# Patient Record
Sex: MALE | Race: BLACK OR AFRICAN AMERICAN | NOT HISPANIC OR LATINO | URBAN - METROPOLITAN AREA
[De-identification: names, ages, dates, MRNs, and addresses within clinical notes are randomized per-mention and may not be internally consistent; named-entity substitution may affect disease eponyms.]

---

## 2018-05-16 ENCOUNTER — INPATIENT (INPATIENT)
Facility: HOSPITAL | Age: 58
LOS: 14 days | Discharge: HOME | End: 2018-05-31
Attending: PSYCHIATRY & NEUROLOGY | Admitting: PSYCHIATRY & NEUROLOGY
Payer: MEDICAID

## 2018-05-16 VITALS
TEMPERATURE: 98 F | SYSTOLIC BLOOD PRESSURE: 154 MMHG | OXYGEN SATURATION: 98 % | HEART RATE: 94 BPM | DIASTOLIC BLOOD PRESSURE: 85 MMHG | RESPIRATION RATE: 18 BRPM

## 2018-05-16 DIAGNOSIS — F20.9 SCHIZOPHRENIA, UNSPECIFIED: ICD-10-CM

## 2018-05-16 DIAGNOSIS — F25.9 SCHIZOAFFECTIVE DISORDER, UNSPECIFIED: ICD-10-CM

## 2018-05-16 LAB
ALBUMIN SERPL ELPH-MCNC: 3.5 G/DL — SIGNIFICANT CHANGE UP (ref 3.3–5)
ALP SERPL-CCNC: 100 U/L — SIGNIFICANT CHANGE UP (ref 40–120)
ALT FLD-CCNC: 30 U/L — SIGNIFICANT CHANGE UP (ref 4–41)
APAP SERPL-MCNC: < 15 UG/ML — LOW (ref 15–25)
AST SERPL-CCNC: 63 U/L — HIGH (ref 4–40)
BASOPHILS # BLD AUTO: 0.02 K/UL — SIGNIFICANT CHANGE UP (ref 0–0.2)
BASOPHILS NFR BLD AUTO: 0.3 % — SIGNIFICANT CHANGE UP (ref 0–2)
BILIRUB SERPL-MCNC: 0.3 MG/DL — SIGNIFICANT CHANGE UP (ref 0.2–1.2)
BUN SERPL-MCNC: 14 MG/DL — SIGNIFICANT CHANGE UP (ref 7–23)
CALCIUM SERPL-MCNC: 8.5 MG/DL — SIGNIFICANT CHANGE UP (ref 8.4–10.5)
CHLORIDE SERPL-SCNC: 99 MMOL/L — SIGNIFICANT CHANGE UP (ref 98–107)
CO2 SERPL-SCNC: 26 MMOL/L — SIGNIFICANT CHANGE UP (ref 22–31)
CREAT SERPL-MCNC: 0.75 MG/DL — SIGNIFICANT CHANGE UP (ref 0.5–1.3)
EOSINOPHIL # BLD AUTO: 0.08 K/UL — SIGNIFICANT CHANGE UP (ref 0–0.5)
EOSINOPHIL NFR BLD AUTO: 1.2 % — SIGNIFICANT CHANGE UP (ref 0–6)
ETHANOL BLD-MCNC: < 10 MG/DL — SIGNIFICANT CHANGE UP
GLUCOSE SERPL-MCNC: 118 MG/DL — HIGH (ref 70–99)
HCT VFR BLD CALC: 39.2 % — SIGNIFICANT CHANGE UP (ref 39–50)
HGB BLD-MCNC: 12.9 G/DL — LOW (ref 13–17)
IMM GRANULOCYTES # BLD AUTO: 0.02 # — SIGNIFICANT CHANGE UP
IMM GRANULOCYTES NFR BLD AUTO: 0.3 % — SIGNIFICANT CHANGE UP (ref 0–1.5)
LYMPHOCYTES # BLD AUTO: 1.15 K/UL — SIGNIFICANT CHANGE UP (ref 1–3.3)
LYMPHOCYTES # BLD AUTO: 17.1 % — SIGNIFICANT CHANGE UP (ref 13–44)
MCHC RBC-ENTMCNC: 30.1 PG — SIGNIFICANT CHANGE UP (ref 27–34)
MCHC RBC-ENTMCNC: 32.9 % — SIGNIFICANT CHANGE UP (ref 32–36)
MCV RBC AUTO: 91.6 FL — SIGNIFICANT CHANGE UP (ref 80–100)
MONOCYTES # BLD AUTO: 0.79 K/UL — SIGNIFICANT CHANGE UP (ref 0–0.9)
MONOCYTES NFR BLD AUTO: 11.7 % — SIGNIFICANT CHANGE UP (ref 2–14)
NEUTROPHILS # BLD AUTO: 4.67 K/UL — SIGNIFICANT CHANGE UP (ref 1.8–7.4)
NEUTROPHILS NFR BLD AUTO: 69.4 % — SIGNIFICANT CHANGE UP (ref 43–77)
NRBC # FLD: 0 — SIGNIFICANT CHANGE UP
PLATELET # BLD AUTO: 262 K/UL — SIGNIFICANT CHANGE UP (ref 150–400)
PMV BLD: 9.3 FL — SIGNIFICANT CHANGE UP (ref 7–13)
POTASSIUM SERPL-MCNC: 3.9 MMOL/L — SIGNIFICANT CHANGE UP (ref 3.5–5.3)
POTASSIUM SERPL-SCNC: 3.9 MMOL/L — SIGNIFICANT CHANGE UP (ref 3.5–5.3)
PROT SERPL-MCNC: 6.7 G/DL — SIGNIFICANT CHANGE UP (ref 6–8.3)
RBC # BLD: 4.28 M/UL — SIGNIFICANT CHANGE UP (ref 4.2–5.8)
RBC # FLD: 13 % — SIGNIFICANT CHANGE UP (ref 10.3–14.5)
SALICYLATES SERPL-MCNC: < 5 MG/DL — LOW (ref 15–30)
SODIUM SERPL-SCNC: 136 MMOL/L — SIGNIFICANT CHANGE UP (ref 135–145)
TSH SERPL-MCNC: 0.72 UIU/ML — SIGNIFICANT CHANGE UP (ref 0.27–4.2)
WBC # BLD: 6.73 K/UL — SIGNIFICANT CHANGE UP (ref 3.8–10.5)
WBC # FLD AUTO: 6.73 K/UL — SIGNIFICANT CHANGE UP (ref 3.8–10.5)

## 2018-05-16 PROCEDURE — 99285 EMERGENCY DEPT VISIT HI MDM: CPT

## 2018-05-16 PROCEDURE — 73030 X-RAY EXAM OF SHOULDER: CPT | Mod: 26,RT

## 2018-05-16 RX ORDER — DIPHENHYDRAMINE HCL 50 MG
50 CAPSULE ORAL ONCE
Qty: 0 | Refills: 0 | Status: DISCONTINUED | OUTPATIENT
Start: 2018-05-16 | End: 2018-05-31

## 2018-05-16 RX ORDER — HALOPERIDOL DECANOATE 100 MG/ML
5 INJECTION INTRAMUSCULAR ONCE
Qty: 0 | Refills: 0 | Status: DISCONTINUED | OUTPATIENT
Start: 2018-05-16 | End: 2018-05-23

## 2018-05-16 RX ORDER — BENZTROPINE MESYLATE 1 MG
0.5 TABLET ORAL
Qty: 0 | Refills: 0 | Status: DISCONTINUED | OUTPATIENT
Start: 2018-05-16 | End: 2018-05-31

## 2018-05-16 RX ORDER — HALOPERIDOL DECANOATE 100 MG/ML
5 INJECTION INTRAMUSCULAR EVERY 6 HOURS
Qty: 0 | Refills: 0 | Status: DISCONTINUED | OUTPATIENT
Start: 2018-05-16 | End: 2018-05-31

## 2018-05-16 RX ORDER — RISPERIDONE 4 MG/1
2 TABLET ORAL
Qty: 0 | Refills: 0 | Status: DISCONTINUED | OUTPATIENT
Start: 2018-05-16 | End: 2018-05-17

## 2018-05-16 RX ORDER — DIVALPROEX SODIUM 500 MG/1
500 TABLET, DELAYED RELEASE ORAL
Qty: 0 | Refills: 0 | Status: DISCONTINUED | OUTPATIENT
Start: 2018-05-16 | End: 2018-05-22

## 2018-05-16 RX ORDER — ALBUTEROL 90 UG/1
1 AEROSOL, METERED ORAL EVERY 4 HOURS
Qty: 0 | Refills: 0 | Status: DISCONTINUED | OUTPATIENT
Start: 2018-05-16 | End: 2018-05-31

## 2018-05-16 RX ORDER — HALOPERIDOL DECANOATE 100 MG/ML
5 INJECTION INTRAMUSCULAR ONCE
Qty: 0 | Refills: 0 | Status: DISCONTINUED | OUTPATIENT
Start: 2018-05-16 | End: 2018-05-31

## 2018-05-16 RX ORDER — DIPHENHYDRAMINE HCL 50 MG
50 CAPSULE ORAL EVERY 6 HOURS
Qty: 0 | Refills: 0 | Status: DISCONTINUED | OUTPATIENT
Start: 2018-05-16 | End: 2018-05-31

## 2018-05-16 NOTE — ED BEHAVIORAL HEALTH ASSESSMENT NOTE - RISK ASSESSMENT
Risk factors- male gender, psychiatric illness, acute psychosis, history of suicide attempt, remote history of substance use, global insomnia, severe agitation, poor impulse control and insight.  Protective factors - domiciled.  Patient. is an acute risk, meets criteria of inpatient hospitalization of safety and stabilization.

## 2018-05-16 NOTE — ED BEHAVIORAL HEALTH ASSESSMENT NOTE - CURRENT MEDICATION
Unable to assess, pt. agitated and psychotic Unable to assess, pt. agitated and psychotic, but per sister he usually get Fluphenazine 50mg qmonth
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
## 2018-05-16 NOTE — ED BEHAVIORAL HEALTH ASSESSMENT NOTE - DESCRIPTION
Calm and cooperative  Vital Signs Last 24 Hrs  T(C): 36.9 (16 May 2018 19:06), Max: 36.9 (16 May 2018 19:06)  T(F): 98.5 (16 May 2018 19:06), Max: 98.5 (16 May 2018 19:06)  HR: 94 (16 May 2018 19:06) (94 - 94)  BP: 154/85 (16 May 2018 19:06) (154/85 - 154/85)  BP(mean): --  RR: 18 (16 May 2018 19:06) (18 - 18)  SpO2: 98% (16 May 2018 19:06) (98% - 98%) Asthma Single, disabled,

## 2018-05-16 NOTE — ED BEHAVIORAL HEALTH ASSESSMENT NOTE - OTHER
Semi-cooperative history of suicide attempt Possible homicidal ideation Patient. denies, but appears internally preoccupied. EMS Lives with his sister and her 3 children.
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
## 2018-05-16 NOTE — ED BEHAVIORAL HEALTH ASSESSMENT NOTE - OTHER PAST PSYCHIATRIC HISTORY (INCLUDE DETAILS REGARDING ONSET, COURSE OF ILLNESS, INPATIENT/OUTPATIENT TREATMENT)
history of Schizophrenia, >11 [facility], last 9/20/16, PMH- asthma, as per CVM -suicide attempts via overdose. According per chart, the pt.  has received a JONES, unknown where, when or name of medication.

## 2018-05-16 NOTE — ED BEHAVIORAL HEALTH ASSESSMENT NOTE - SUICIDE RISK FACTORS
Unable to engage in safety planning/Highly impulsive behavior/Global insomnia/Mood episode/Agitation/severe anxiety/Other

## 2018-05-16 NOTE — ED BEHAVIORAL HEALTH NOTE - BEHAVIORAL HEALTH NOTE
The patient has a history of 11 inpatient episodes at [facility], most recently discharged 3 years ago. He is in OP treatment at [facility], with Dr. Downey, where he is prescribed fluphenazine decanoate 50 mg q monthly, last administered 4/12/18. Writer spoke with his sister, Melaina Klein, [phone], for collateral information. She reported the following:    The patient’s injection was scheduled to be administered today, but the patient refused to go to the clinic. He first became symptomatic 7 days ago. Since then, he has been sad, eating erratically, his hygiene has been deteriorating, and he has not been sleeping. He has been pacing around the house, leaving lights on everywhere, and neighborhood all hours of the day and night. He has been aggressively yelling in the informant’s face, calling her profanities, and telling her to get away from him. He has been saying he wants to kill the informant, as he generally does when decompensating, but has never been physically aggressive toward anyone that she is aware of. He has been tearing up belongings and important documents of hers and his, including his birth certificate and social security card, and Bible. He took belongings out of the kitchen and discarded them. The informant is not aware of whether this includes kitchen knives. While he normally attends [employer], he is hyperreligious lately, talking about Arsen and the devil. He has been responding to internal stimuli, having conversations with others not present. He has been expressing delusions, stating others are out to get him and are following him. The patient has not expressed passive or active suicidal ideation, nor engaged in self-injurious behavior.     Writer obtained a bed on [facility] at [facility], and informed the patient’s sister, providing information about the unit.
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
## 2018-05-16 NOTE — ED BEHAVIORAL HEALTH ASSESSMENT NOTE - PAST PSYCHOTROPIC MEDICATION
Risperdal and Depakote- as per CVM Risperda 3mg BID, Cognetin 0.5mg BID and Depakote 500mg BID- as per record Risperdal 3mg BID, Cogentin 0.5mg BID and Depakote 500mg BID- as per record

## 2018-05-16 NOTE — ED BEHAVIORAL HEALTH ASSESSMENT NOTE - PSYCHIATRIC ISSUES AND PLAN (INCLUDE STANDING AND PRN MEDICATION)
Start Risperdal M-tab 2mg po BID , consider JONES, Haldol 5 mg po/IM q 6hours prn agitation, Ativan 2 mg po/Im q 4 hours prn agitation, Benadryl 50 mg tpo/IM q 4 hours prn agitation. Start Risperdal M-tab 2mg po BID, VPA 500mg BID, Cognetin 0..5mg bid , consider JONES, Haldol 5/ Ativan2/ Benadryl 50 q6H PRN for agitation
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
## 2018-05-17 LAB
AMPHET UR-MCNC: NEGATIVE — SIGNIFICANT CHANGE UP
APPEARANCE UR: CLEAR — SIGNIFICANT CHANGE UP
BACTERIA # UR AUTO: SIGNIFICANT CHANGE UP
BARBITURATES UR SCN-MCNC: NEGATIVE — SIGNIFICANT CHANGE UP
BENZODIAZ UR-MCNC: NEGATIVE — SIGNIFICANT CHANGE UP
BILIRUB UR-MCNC: NEGATIVE — SIGNIFICANT CHANGE UP
BLOOD UR QL VISUAL: NEGATIVE — SIGNIFICANT CHANGE UP
CANNABINOIDS UR-MCNC: POSITIVE — SIGNIFICANT CHANGE UP
COCAINE METAB.OTHER UR-MCNC: NEGATIVE — SIGNIFICANT CHANGE UP
COLOR SPEC: SIGNIFICANT CHANGE UP
GLUCOSE UR-MCNC: NEGATIVE — SIGNIFICANT CHANGE UP
KETONES UR-MCNC: NEGATIVE — SIGNIFICANT CHANGE UP
LEUKOCYTE ESTERASE UR-ACNC: NEGATIVE — SIGNIFICANT CHANGE UP
METHADONE UR-MCNC: NEGATIVE — SIGNIFICANT CHANGE UP
NITRITE UR-MCNC: NEGATIVE — SIGNIFICANT CHANGE UP
NON-SQ EPI CELLS # UR AUTO: <1 — SIGNIFICANT CHANGE UP
OPIATES UR-MCNC: NEGATIVE — SIGNIFICANT CHANGE UP
OXYCODONE UR-MCNC: NEGATIVE — SIGNIFICANT CHANGE UP
PCP UR-MCNC: NEGATIVE — SIGNIFICANT CHANGE UP
PH UR: 7 — SIGNIFICANT CHANGE UP (ref 4.6–8)
PROT UR-MCNC: NEGATIVE MG/DL — SIGNIFICANT CHANGE UP
RBC CASTS # UR COMP ASSIST: SIGNIFICANT CHANGE UP (ref 0–?)
SP GR SPEC: 1.01 — SIGNIFICANT CHANGE UP (ref 1–1.04)
UROBILINOGEN FLD QL: NORMAL MG/DL — SIGNIFICANT CHANGE UP
WBC UR QL: SIGNIFICANT CHANGE UP (ref 0–?)

## 2018-05-17 PROCEDURE — 99223 1ST HOSP IP/OBS HIGH 75: CPT

## 2018-05-17 RX ORDER — RISPERIDONE 4 MG/1
2 TABLET ORAL AT BEDTIME
Qty: 0 | Refills: 0 | Status: DISCONTINUED | OUTPATIENT
Start: 2018-05-18 | End: 2018-05-20

## 2018-05-17 RX ADMIN — Medication 2 MILLIGRAM(S): at 04:25

## 2018-05-17 RX ADMIN — DIVALPROEX SODIUM 500 MILLIGRAM(S): 500 TABLET, DELAYED RELEASE ORAL at 08:18

## 2018-05-17 RX ADMIN — DIVALPROEX SODIUM 500 MILLIGRAM(S): 500 TABLET, DELAYED RELEASE ORAL at 20:20

## 2018-05-17 RX ADMIN — Medication 0.5 MILLIGRAM(S): at 08:18

## 2018-05-17 RX ADMIN — Medication 50 MILLIGRAM(S): at 04:25

## 2018-05-17 RX ADMIN — HALOPERIDOL DECANOATE 5 MILLIGRAM(S): 100 INJECTION INTRAMUSCULAR at 04:25

## 2018-05-17 RX ADMIN — RISPERIDONE 2 MILLIGRAM(S): 4 TABLET ORAL at 08:18

## 2018-05-17 RX ADMIN — Medication 0.5 MILLIGRAM(S): at 20:20

## 2018-05-18 LAB
CHOLEST SERPL-MCNC: 108 MG/DL — LOW (ref 120–199)
HBA1C BLD-MCNC: 6 % — HIGH (ref 4–5.6)
HDLC SERPL-MCNC: 50 MG/DL — SIGNIFICANT CHANGE UP (ref 35–55)
LIPID PNL WITH DIRECT LDL SERPL: 52 MG/DL — SIGNIFICANT CHANGE UP
TRIGL SERPL-MCNC: 66 MG/DL — SIGNIFICANT CHANGE UP (ref 10–149)

## 2018-05-18 PROCEDURE — 99232 SBSQ HOSP IP/OBS MODERATE 35: CPT | Mod: 25

## 2018-05-18 PROCEDURE — 90853 GROUP PSYCHOTHERAPY: CPT

## 2018-05-18 RX ORDER — FLUPHENAZINE HYDROCHLORIDE 1 MG/1
50 TABLET, FILM COATED ORAL ONCE
Qty: 0 | Refills: 0 | Status: COMPLETED | OUTPATIENT
Start: 2018-05-18 | End: 2018-05-18

## 2018-05-18 RX ADMIN — Medication 2 MILLIGRAM(S): at 02:09

## 2018-05-18 RX ADMIN — RISPERIDONE 2 MILLIGRAM(S): 4 TABLET ORAL at 21:49

## 2018-05-18 RX ADMIN — FLUPHENAZINE HYDROCHLORIDE 50 MILLIGRAM(S): 1 TABLET, FILM COATED ORAL at 14:15

## 2018-05-18 RX ADMIN — Medication 0.5 MILLIGRAM(S): at 08:11

## 2018-05-18 RX ADMIN — HALOPERIDOL DECANOATE 5 MILLIGRAM(S): 100 INJECTION INTRAMUSCULAR at 02:09

## 2018-05-18 RX ADMIN — DIVALPROEX SODIUM 500 MILLIGRAM(S): 500 TABLET, DELAYED RELEASE ORAL at 08:11

## 2018-05-18 RX ADMIN — DIVALPROEX SODIUM 500 MILLIGRAM(S): 500 TABLET, DELAYED RELEASE ORAL at 21:49

## 2018-05-18 RX ADMIN — Medication 0.5 MILLIGRAM(S): at 21:49

## 2018-05-18 RX ADMIN — Medication 50 MILLIGRAM(S): at 02:09

## 2018-05-19 PROCEDURE — 99232 SBSQ HOSP IP/OBS MODERATE 35: CPT

## 2018-05-19 RX ADMIN — Medication 0.5 MILLIGRAM(S): at 08:16

## 2018-05-19 RX ADMIN — Medication 0.5 MILLIGRAM(S): at 21:11

## 2018-05-19 RX ADMIN — DIVALPROEX SODIUM 500 MILLIGRAM(S): 500 TABLET, DELAYED RELEASE ORAL at 08:16

## 2018-05-19 RX ADMIN — DIVALPROEX SODIUM 500 MILLIGRAM(S): 500 TABLET, DELAYED RELEASE ORAL at 21:11

## 2018-05-19 RX ADMIN — RISPERIDONE 2 MILLIGRAM(S): 4 TABLET ORAL at 21:11

## 2018-05-20 PROCEDURE — 99232 SBSQ HOSP IP/OBS MODERATE 35: CPT

## 2018-05-20 RX ORDER — RISPERIDONE 4 MG/1
3 TABLET ORAL AT BEDTIME
Qty: 0 | Refills: 0 | Status: DISCONTINUED | OUTPATIENT
Start: 2018-05-20 | End: 2018-05-23

## 2018-05-20 RX ADMIN — DIVALPROEX SODIUM 500 MILLIGRAM(S): 500 TABLET, DELAYED RELEASE ORAL at 21:20

## 2018-05-20 RX ADMIN — Medication 50 MILLIGRAM(S): at 13:26

## 2018-05-20 RX ADMIN — Medication 0.5 MILLIGRAM(S): at 08:31

## 2018-05-20 RX ADMIN — HALOPERIDOL DECANOATE 5 MILLIGRAM(S): 100 INJECTION INTRAMUSCULAR at 13:27

## 2018-05-20 RX ADMIN — Medication 2 MILLIGRAM(S): at 13:27

## 2018-05-20 RX ADMIN — DIVALPROEX SODIUM 500 MILLIGRAM(S): 500 TABLET, DELAYED RELEASE ORAL at 08:31

## 2018-05-20 RX ADMIN — RISPERIDONE 3 MILLIGRAM(S): 4 TABLET ORAL at 21:20

## 2018-05-20 RX ADMIN — Medication 0.5 MILLIGRAM(S): at 21:20

## 2018-05-21 PROCEDURE — 99232 SBSQ HOSP IP/OBS MODERATE 35: CPT

## 2018-05-21 RX ADMIN — DIVALPROEX SODIUM 500 MILLIGRAM(S): 500 TABLET, DELAYED RELEASE ORAL at 08:03

## 2018-05-21 RX ADMIN — Medication 0.5 MILLIGRAM(S): at 20:15

## 2018-05-21 RX ADMIN — Medication 0.5 MILLIGRAM(S): at 08:03

## 2018-05-21 RX ADMIN — DIVALPROEX SODIUM 500 MILLIGRAM(S): 500 TABLET, DELAYED RELEASE ORAL at 20:15

## 2018-05-21 RX ADMIN — RISPERIDONE 3 MILLIGRAM(S): 4 TABLET ORAL at 20:15

## 2018-05-22 LAB — VALPROATE SERPL-MCNC: 46.4 UG/ML — LOW (ref 50–100)

## 2018-05-22 PROCEDURE — 99232 SBSQ HOSP IP/OBS MODERATE 35: CPT | Mod: 25

## 2018-05-22 PROCEDURE — 90853 GROUP PSYCHOTHERAPY: CPT

## 2018-05-22 RX ORDER — IBUPROFEN 200 MG
400 TABLET ORAL ONCE
Qty: 0 | Refills: 0 | Status: COMPLETED | OUTPATIENT
Start: 2018-05-22 | End: 2018-05-22

## 2018-05-22 RX ORDER — DIVALPROEX SODIUM 500 MG/1
750 TABLET, DELAYED RELEASE ORAL
Qty: 0 | Refills: 0 | Status: DISCONTINUED | OUTPATIENT
Start: 2018-05-22 | End: 2018-05-31

## 2018-05-22 RX ADMIN — Medication 400 MILLIGRAM(S): at 21:30

## 2018-05-22 RX ADMIN — DIVALPROEX SODIUM 500 MILLIGRAM(S): 500 TABLET, DELAYED RELEASE ORAL at 08:15

## 2018-05-22 RX ADMIN — Medication 400 MILLIGRAM(S): at 06:39

## 2018-05-22 RX ADMIN — Medication 400 MILLIGRAM(S): at 05:39

## 2018-05-22 RX ADMIN — DIVALPROEX SODIUM 750 MILLIGRAM(S): 500 TABLET, DELAYED RELEASE ORAL at 21:30

## 2018-05-22 RX ADMIN — Medication 0.5 MILLIGRAM(S): at 21:30

## 2018-05-22 RX ADMIN — RISPERIDONE 3 MILLIGRAM(S): 4 TABLET ORAL at 21:30

## 2018-05-22 RX ADMIN — Medication 0.5 MILLIGRAM(S): at 08:15

## 2018-05-22 RX ADMIN — Medication 400 MILLIGRAM(S): at 22:30

## 2018-05-23 PROCEDURE — 99232 SBSQ HOSP IP/OBS MODERATE 35: CPT

## 2018-05-23 RX ORDER — RISPERIDONE 4 MG/1
4 TABLET ORAL AT BEDTIME
Qty: 0 | Refills: 0 | Status: DISCONTINUED | OUTPATIENT
Start: 2018-05-23 | End: 2018-05-31

## 2018-05-23 RX ORDER — HALOPERIDOL DECANOATE 100 MG/ML
5 INJECTION INTRAMUSCULAR ONCE
Qty: 0 | Refills: 0 | Status: DISCONTINUED | OUTPATIENT
Start: 2018-05-23 | End: 2018-05-31

## 2018-05-23 RX ADMIN — DIVALPROEX SODIUM 750 MILLIGRAM(S): 500 TABLET, DELAYED RELEASE ORAL at 08:11

## 2018-05-23 RX ADMIN — Medication 0.5 MILLIGRAM(S): at 08:11

## 2018-05-23 RX ADMIN — HALOPERIDOL DECANOATE 5 MILLIGRAM(S): 100 INJECTION INTRAMUSCULAR at 17:30

## 2018-05-23 RX ADMIN — RISPERIDONE 4 MILLIGRAM(S): 4 TABLET ORAL at 20:46

## 2018-05-23 RX ADMIN — Medication 50 MILLIGRAM(S): at 17:30

## 2018-05-23 RX ADMIN — Medication 2 MILLIGRAM(S): at 17:47

## 2018-05-23 RX ADMIN — DIVALPROEX SODIUM 750 MILLIGRAM(S): 500 TABLET, DELAYED RELEASE ORAL at 20:46

## 2018-05-23 RX ADMIN — Medication 0.5 MILLIGRAM(S): at 20:46

## 2018-05-24 PROCEDURE — 99232 SBSQ HOSP IP/OBS MODERATE 35: CPT

## 2018-05-24 RX ADMIN — Medication 50 MILLIGRAM(S): at 12:26

## 2018-05-24 RX ADMIN — Medication 0.5 MILLIGRAM(S): at 08:51

## 2018-05-24 RX ADMIN — Medication 0.5 MILLIGRAM(S): at 21:50

## 2018-05-24 RX ADMIN — RISPERIDONE 4 MILLIGRAM(S): 4 TABLET ORAL at 21:50

## 2018-05-24 RX ADMIN — Medication 2 MILLIGRAM(S): at 12:26

## 2018-05-24 RX ADMIN — DIVALPROEX SODIUM 750 MILLIGRAM(S): 500 TABLET, DELAYED RELEASE ORAL at 08:51

## 2018-05-24 RX ADMIN — DIVALPROEX SODIUM 750 MILLIGRAM(S): 500 TABLET, DELAYED RELEASE ORAL at 21:50

## 2018-05-24 RX ADMIN — HALOPERIDOL DECANOATE 5 MILLIGRAM(S): 100 INJECTION INTRAMUSCULAR at 12:26

## 2018-05-25 PROCEDURE — 99232 SBSQ HOSP IP/OBS MODERATE 35: CPT

## 2018-05-25 RX ORDER — IBUPROFEN 200 MG
400 TABLET ORAL EVERY 6 HOURS
Qty: 0 | Refills: 0 | Status: DISCONTINUED | OUTPATIENT
Start: 2018-05-25 | End: 2018-05-31

## 2018-05-25 RX ADMIN — DIVALPROEX SODIUM 750 MILLIGRAM(S): 500 TABLET, DELAYED RELEASE ORAL at 21:07

## 2018-05-25 RX ADMIN — Medication 400 MILLIGRAM(S): at 00:33

## 2018-05-25 RX ADMIN — Medication 0.5 MILLIGRAM(S): at 21:07

## 2018-05-25 RX ADMIN — Medication 0.5 MILLIGRAM(S): at 08:20

## 2018-05-25 RX ADMIN — DIVALPROEX SODIUM 750 MILLIGRAM(S): 500 TABLET, DELAYED RELEASE ORAL at 08:20

## 2018-05-25 RX ADMIN — RISPERIDONE 4 MILLIGRAM(S): 4 TABLET ORAL at 21:07

## 2018-05-26 RX ADMIN — Medication 2 MILLIGRAM(S): at 20:10

## 2018-05-26 RX ADMIN — Medication 0.5 MILLIGRAM(S): at 20:09

## 2018-05-26 RX ADMIN — DIVALPROEX SODIUM 750 MILLIGRAM(S): 500 TABLET, DELAYED RELEASE ORAL at 20:09

## 2018-05-26 RX ADMIN — RISPERIDONE 4 MILLIGRAM(S): 4 TABLET ORAL at 20:09

## 2018-05-26 RX ADMIN — HALOPERIDOL DECANOATE 5 MILLIGRAM(S): 100 INJECTION INTRAMUSCULAR at 20:10

## 2018-05-26 RX ADMIN — Medication 50 MILLIGRAM(S): at 20:10

## 2018-05-26 RX ADMIN — DIVALPROEX SODIUM 750 MILLIGRAM(S): 500 TABLET, DELAYED RELEASE ORAL at 08:22

## 2018-05-26 RX ADMIN — Medication 0.5 MILLIGRAM(S): at 08:22

## 2018-05-27 LAB — VALPROATE SERPL-MCNC: 73.9 UG/ML — SIGNIFICANT CHANGE UP (ref 50–100)

## 2018-05-27 RX ORDER — MAGNESIUM HYDROXIDE 400 MG/1
30 TABLET, CHEWABLE ORAL ONCE
Qty: 0 | Refills: 0 | Status: COMPLETED | OUTPATIENT
Start: 2018-05-27 | End: 2018-05-27

## 2018-05-27 RX ADMIN — DIVALPROEX SODIUM 750 MILLIGRAM(S): 500 TABLET, DELAYED RELEASE ORAL at 08:24

## 2018-05-27 RX ADMIN — Medication 0.5 MILLIGRAM(S): at 08:24

## 2018-05-27 RX ADMIN — MAGNESIUM HYDROXIDE 30 MILLILITER(S): 400 TABLET, CHEWABLE ORAL at 22:21

## 2018-05-27 RX ADMIN — Medication 2 MILLIGRAM(S): at 15:50

## 2018-05-27 RX ADMIN — RISPERIDONE 4 MILLIGRAM(S): 4 TABLET ORAL at 20:38

## 2018-05-27 RX ADMIN — Medication 0.5 MILLIGRAM(S): at 20:37

## 2018-05-27 RX ADMIN — Medication 50 MILLIGRAM(S): at 15:50

## 2018-05-27 RX ADMIN — HALOPERIDOL DECANOATE 5 MILLIGRAM(S): 100 INJECTION INTRAMUSCULAR at 15:50

## 2018-05-27 RX ADMIN — DIVALPROEX SODIUM 750 MILLIGRAM(S): 500 TABLET, DELAYED RELEASE ORAL at 20:38

## 2018-05-28 RX ADMIN — Medication 0.5 MILLIGRAM(S): at 08:08

## 2018-05-28 RX ADMIN — RISPERIDONE 4 MILLIGRAM(S): 4 TABLET ORAL at 20:50

## 2018-05-28 RX ADMIN — DIVALPROEX SODIUM 750 MILLIGRAM(S): 500 TABLET, DELAYED RELEASE ORAL at 20:50

## 2018-05-28 RX ADMIN — DIVALPROEX SODIUM 750 MILLIGRAM(S): 500 TABLET, DELAYED RELEASE ORAL at 08:08

## 2018-05-28 RX ADMIN — Medication 0.5 MILLIGRAM(S): at 20:50

## 2018-05-29 PROCEDURE — 90853 GROUP PSYCHOTHERAPY: CPT

## 2018-05-29 PROCEDURE — 99232 SBSQ HOSP IP/OBS MODERATE 35: CPT | Mod: 25

## 2018-05-29 RX ADMIN — RISPERIDONE 4 MILLIGRAM(S): 4 TABLET ORAL at 20:28

## 2018-05-29 RX ADMIN — Medication 0.5 MILLIGRAM(S): at 20:28

## 2018-05-29 RX ADMIN — DIVALPROEX SODIUM 750 MILLIGRAM(S): 500 TABLET, DELAYED RELEASE ORAL at 08:03

## 2018-05-29 RX ADMIN — Medication 0.5 MILLIGRAM(S): at 08:03

## 2018-05-29 RX ADMIN — DIVALPROEX SODIUM 750 MILLIGRAM(S): 500 TABLET, DELAYED RELEASE ORAL at 20:28

## 2018-05-30 VITALS — TEMPERATURE: 98 F

## 2018-05-30 PROCEDURE — 99232 SBSQ HOSP IP/OBS MODERATE 35: CPT

## 2018-05-30 RX ADMIN — DIVALPROEX SODIUM 750 MILLIGRAM(S): 500 TABLET, DELAYED RELEASE ORAL at 08:13

## 2018-05-30 RX ADMIN — Medication 0.5 MILLIGRAM(S): at 08:13

## 2018-05-30 RX ADMIN — Medication 0.5 MILLIGRAM(S): at 20:40

## 2018-05-30 RX ADMIN — RISPERIDONE 4 MILLIGRAM(S): 4 TABLET ORAL at 20:40

## 2018-05-30 RX ADMIN — DIVALPROEX SODIUM 750 MILLIGRAM(S): 500 TABLET, DELAYED RELEASE ORAL at 20:40

## 2018-05-31 RX ORDER — BENZTROPINE MESYLATE 1 MG
1 TABLET ORAL
Qty: 30 | Refills: 0 | OUTPATIENT
Start: 2018-05-31 | End: 2018-06-14

## 2018-05-31 RX ORDER — RISPERIDONE 4 MG/1
1 TABLET ORAL
Qty: 14 | Refills: 0 | OUTPATIENT
Start: 2018-05-31 | End: 2018-06-13

## 2018-05-31 RX ORDER — ALBUTEROL 90 UG/1
1 AEROSOL, METERED ORAL
Qty: 1 | Refills: 0 | OUTPATIENT
Start: 2018-05-31 | End: 2018-06-29

## 2018-05-31 RX ORDER — FLUPHENAZINE HYDROCHLORIDE 1 MG/1
50 TABLET, FILM COATED ORAL ONCE
Qty: 0 | Refills: 0 | Status: COMPLETED | OUTPATIENT
Start: 2018-05-31 | End: 2018-05-31

## 2018-05-31 RX ORDER — DIVALPROEX SODIUM 500 MG/1
3 TABLET, DELAYED RELEASE ORAL
Qty: 84 | Refills: 0 | OUTPATIENT
Start: 2018-05-31 | End: 2018-06-13

## 2018-05-31 RX ADMIN — DIVALPROEX SODIUM 750 MILLIGRAM(S): 500 TABLET, DELAYED RELEASE ORAL at 08:13

## 2018-05-31 RX ADMIN — Medication 0.5 MILLIGRAM(S): at 08:13

## 2018-05-31 RX ADMIN — FLUPHENAZINE HYDROCHLORIDE 50 MILLIGRAM(S): 1 TABLET, FILM COATED ORAL at 11:16

## 2018-10-15 ENCOUNTER — INPATIENT (INPATIENT)
Facility: HOSPITAL | Age: 58
LOS: 14 days | Discharge: HOME | End: 2018-10-30
Attending: PSYCHIATRY & NEUROLOGY | Admitting: PSYCHIATRY & NEUROLOGY
Payer: MEDICAID

## 2018-10-15 VITALS
TEMPERATURE: 98 F | HEART RATE: 54 BPM | SYSTOLIC BLOOD PRESSURE: 116 MMHG | OXYGEN SATURATION: 95 % | RESPIRATION RATE: 16 BRPM | DIASTOLIC BLOOD PRESSURE: 83 MMHG

## 2018-10-15 DIAGNOSIS — F25.9 SCHIZOAFFECTIVE DISORDER, UNSPECIFIED: ICD-10-CM

## 2018-10-15 LAB
ALBUMIN SERPL ELPH-MCNC: 3.6 G/DL — SIGNIFICANT CHANGE UP (ref 3.3–5)
ALP SERPL-CCNC: 87 U/L — SIGNIFICANT CHANGE UP (ref 40–120)
ALT FLD-CCNC: 13 U/L — SIGNIFICANT CHANGE UP (ref 4–41)
APAP SERPL-MCNC: < 15 UG/ML — LOW (ref 15–25)
AST SERPL-CCNC: 25 U/L — SIGNIFICANT CHANGE UP (ref 4–40)
BASOPHILS # BLD AUTO: 0.05 K/UL — SIGNIFICANT CHANGE UP (ref 0–0.2)
BASOPHILS NFR BLD AUTO: 0.9 % — SIGNIFICANT CHANGE UP (ref 0–2)
BILIRUB SERPL-MCNC: 0.2 MG/DL — SIGNIFICANT CHANGE UP (ref 0.2–1.2)
BUN SERPL-MCNC: 13 MG/DL — SIGNIFICANT CHANGE UP (ref 7–23)
CALCIUM SERPL-MCNC: 8.8 MG/DL — SIGNIFICANT CHANGE UP (ref 8.4–10.5)
CHLORIDE SERPL-SCNC: 101 MMOL/L — SIGNIFICANT CHANGE UP (ref 98–107)
CO2 SERPL-SCNC: 27 MMOL/L — SIGNIFICANT CHANGE UP (ref 22–31)
CREAT SERPL-MCNC: 0.81 MG/DL — SIGNIFICANT CHANGE UP (ref 0.5–1.3)
EOSINOPHIL # BLD AUTO: 0.5 K/UL — SIGNIFICANT CHANGE UP (ref 0–0.5)
EOSINOPHIL NFR BLD AUTO: 8.7 % — HIGH (ref 0–6)
ETHANOL BLD-MCNC: < 10 MG/DL — SIGNIFICANT CHANGE UP
GLUCOSE SERPL-MCNC: 112 MG/DL — HIGH (ref 70–99)
HCT VFR BLD CALC: 37.6 % — LOW (ref 39–50)
HGB BLD-MCNC: 12.2 G/DL — LOW (ref 13–17)
IMM GRANULOCYTES # BLD AUTO: 0.01 # — SIGNIFICANT CHANGE UP
IMM GRANULOCYTES NFR BLD AUTO: 0.2 % — SIGNIFICANT CHANGE UP (ref 0–1.5)
LYMPHOCYTES # BLD AUTO: 1.17 K/UL — SIGNIFICANT CHANGE UP (ref 1–3.3)
LYMPHOCYTES # BLD AUTO: 20.5 % — SIGNIFICANT CHANGE UP (ref 13–44)
MCHC RBC-ENTMCNC: 29.8 PG — SIGNIFICANT CHANGE UP (ref 27–34)
MCHC RBC-ENTMCNC: 32.4 % — SIGNIFICANT CHANGE UP (ref 32–36)
MCV RBC AUTO: 91.7 FL — SIGNIFICANT CHANGE UP (ref 80–100)
MONOCYTES # BLD AUTO: 0.72 K/UL — SIGNIFICANT CHANGE UP (ref 0–0.9)
MONOCYTES NFR BLD AUTO: 12.6 % — SIGNIFICANT CHANGE UP (ref 2–14)
NEUTROPHILS # BLD AUTO: 3.27 K/UL — SIGNIFICANT CHANGE UP (ref 1.8–7.4)
NEUTROPHILS NFR BLD AUTO: 57.1 % — SIGNIFICANT CHANGE UP (ref 43–77)
NRBC # FLD: 0 — SIGNIFICANT CHANGE UP
PLATELET # BLD AUTO: 419 K/UL — HIGH (ref 150–400)
PMV BLD: 8.7 FL — SIGNIFICANT CHANGE UP (ref 7–13)
POTASSIUM SERPL-MCNC: 3.8 MMOL/L — SIGNIFICANT CHANGE UP (ref 3.5–5.3)
POTASSIUM SERPL-SCNC: 3.8 MMOL/L — SIGNIFICANT CHANGE UP (ref 3.5–5.3)
PROT SERPL-MCNC: 6.1 G/DL — SIGNIFICANT CHANGE UP (ref 6–8.3)
RBC # BLD: 4.1 M/UL — LOW (ref 4.2–5.8)
RBC # FLD: 13.2 % — SIGNIFICANT CHANGE UP (ref 10.3–14.5)
SALICYLATES SERPL-MCNC: < 5 MG/DL — LOW (ref 15–30)
SODIUM SERPL-SCNC: 140 MMOL/L — SIGNIFICANT CHANGE UP (ref 135–145)
TSH SERPL-MCNC: 0.98 UIU/ML — SIGNIFICANT CHANGE UP (ref 0.27–4.2)
VALPROATE SERPL-MCNC: < 3.2 UG/ML — LOW (ref 50–100)
WBC # BLD: 5.72 K/UL — SIGNIFICANT CHANGE UP (ref 3.8–10.5)
WBC # FLD AUTO: 5.72 K/UL — SIGNIFICANT CHANGE UP (ref 3.8–10.5)

## 2018-10-15 PROCEDURE — 99285 EMERGENCY DEPT VISIT HI MDM: CPT

## 2018-10-15 RX ORDER — HALOPERIDOL DECANOATE 100 MG/ML
5 INJECTION INTRAMUSCULAR ONCE
Qty: 0 | Refills: 0 | Status: COMPLETED | OUTPATIENT
Start: 2018-10-15 | End: 2018-10-15

## 2018-10-15 RX ORDER — HALOPERIDOL DECANOATE 100 MG/ML
5 INJECTION INTRAMUSCULAR EVERY 6 HOURS
Qty: 0 | Refills: 0 | Status: DISCONTINUED | OUTPATIENT
Start: 2018-10-15 | End: 2018-10-15

## 2018-10-15 RX ORDER — DIPHENHYDRAMINE HCL 50 MG
50 CAPSULE ORAL ONCE
Qty: 0 | Refills: 0 | Status: COMPLETED | OUTPATIENT
Start: 2018-10-15 | End: 2018-10-15

## 2018-10-15 RX ORDER — HALOPERIDOL DECANOATE 100 MG/ML
5 INJECTION INTRAMUSCULAR EVERY 6 HOURS
Qty: 0 | Refills: 0 | Status: DISCONTINUED | OUTPATIENT
Start: 2018-10-15 | End: 2018-10-30

## 2018-10-15 RX ORDER — RISPERIDONE 4 MG/1
1 TABLET ORAL
Qty: 0 | Refills: 0 | Status: DISCONTINUED | OUTPATIENT
Start: 2018-10-15 | End: 2018-10-16

## 2018-10-15 RX ORDER — DIVALPROEX SODIUM 500 MG/1
500 TABLET, DELAYED RELEASE ORAL
Qty: 0 | Refills: 0 | Status: DISCONTINUED | OUTPATIENT
Start: 2018-10-15 | End: 2018-10-16

## 2018-10-15 RX ORDER — ACETAMINOPHEN 500 MG
650 TABLET ORAL EVERY 6 HOURS
Qty: 0 | Refills: 0 | Status: DISCONTINUED | OUTPATIENT
Start: 2018-10-15 | End: 2018-10-30

## 2018-10-15 RX ADMIN — Medication 2 MILLIGRAM(S): at 03:00

## 2018-10-15 RX ADMIN — RISPERIDONE 1 MILLIGRAM(S): 4 TABLET ORAL at 21:07

## 2018-10-15 RX ADMIN — Medication 50 MILLIGRAM(S): at 03:00

## 2018-10-15 RX ADMIN — HALOPERIDOL DECANOATE 5 MILLIGRAM(S): 100 INJECTION INTRAMUSCULAR at 03:00

## 2018-10-15 RX ADMIN — Medication 2 MILLIGRAM(S): at 21:07

## 2018-10-15 RX ADMIN — HALOPERIDOL DECANOATE 5 MILLIGRAM(S): 100 INJECTION INTRAMUSCULAR at 21:07

## 2018-10-15 RX ADMIN — DIVALPROEX SODIUM 500 MILLIGRAM(S): 500 TABLET, DELAYED RELEASE ORAL at 21:07
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
## 2018-10-15 NOTE — ED BEHAVIORAL HEALTH ASSESSMENT NOTE - OTHER PAST PSYCHIATRIC HISTORY (INCLUDE DETAILS REGARDING ONSET, COURSE OF ILLNESS, INPATIENT/OUTPATIENT TREATMENT)
history of Schizophrenia, >12 ZHH, last 9/20/16, PMH- asthma, as per CVM -suicide attempts via overdose. According per chart, the pt.  has received a JONES, unknown where, when or name of medication.

## 2018-10-15 NOTE — ED BEHAVIORAL HEALTH NOTE - BEHAVIORAL HEALTH NOTE
Writer called patient's sister Lisseth Klein [phone] to inform her of patient's transfer to [facility] and provided nursing station contact information.

## 2018-10-15 NOTE — ED BEHAVIORAL HEALTH ASSESSMENT NOTE - PSYCHIATRIC ISSUES AND PLAN (INCLUDE STANDING AND PRN MEDICATION)
schizoaffective disorder: restart risperidone 2mg hs, depakote 750mg hs, Haldol 5mg PO q 6 hrs prn agitation; Ativan 2mg PO q 6hrs prn anxiety/agitation, Diphenhydramine 50mg PO q 6hr prn EPS, insomnia or agitation.      Haldol 5mg IM; Ativan 2mg IM, Diphenhydramine 50mg IM once prn severe agitation (combativeness, assaultive behavior) after notification of a prescribing clinician.

## 2018-10-15 NOTE — ED ADULT NURSE NOTE - NSIMPLEMENTINTERV_GEN_ALL_ED
Implemented All Universal Safety Interventions:  [place] to call system. Call bell, personal items and telephone within reach. Instruct patient to call for assistance. Room bathroom lighting operational. Non-slip footwear when patient is off stretcher. Physically safe environment: no spills, clutter or unnecessary equipment. Stretcher in lowest position, wheels locked, appropriate side rails in place.

## 2018-10-15 NOTE — ED PROVIDER NOTE - MEDICAL DECISION MAKING DETAILS
pt with decompensated shizo-affective - will clear medically and have  evaluate the patient.  Due to severe agitation and aggressiveness IM medications were given.
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
## 2018-10-15 NOTE — ED PROVIDER NOTE - PHYSICAL EXAMINATION
Gen: Well appearing in NAD poor hygiene   Head: NC/AT  Neck: trachea midline  Resp:  No distress  Ext: no deformities  Neuro:  A&O appears non focal  Skin:  Warm and dry as visualized  Psych:  Paranoia

## 2018-10-15 NOTE — ED BEHAVIORAL HEALTH ASSESSMENT NOTE - DESCRIPTION
Asthma Single, disabled, irritable, threatening, agitated required IM medication  ICU Vital Signs Last 24 Hrs  T(C): 36.6 (15 Oct 2018 02:03), Max: 36.6 (15 Oct 2018 02:03)  T(F): 97.9 (15 Oct 2018 02:03), Max: 97.9 (15 Oct 2018 02:03)  HR: 54 (15 Oct 2018 02:03) (54 - 54)  BP: 116/83 (15 Oct 2018 02:03) (116/83 - 116/83)  BP(mean): --  ABP: --  ABP(mean): --  RR: 16 (15 Oct 2018 02:03) (16 - 16)  SpO2: 95% (15 Oct 2018 02:03) (95% - 95%)

## 2018-10-15 NOTE — ED ADULT NURSE NOTE - OBJECTIVE STATEMENT
Pt arrives to  area with history of schizoaffective disorder and non-compliant with medications.  Patient's sister called 911 because patient is non-compliant with medications, bizarre behavior and talking to himself.  Pt ia aggressive and threatening to female staff members saying they are practicing witchcraft. Pt belongings checked by Security, PES placed belongings in low acuity area.  Pt speaking expressing hyper Amish statements.  Denies Si/Hi.  Dr. Strickland evaluated patient in triage. Psychiatrist attempting to speak with pt however the pt is threatening violence to staff.  Pt medicated as per EMAR.  Pt placed in room 6.  Will continue to monitor.
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
## 2018-10-15 NOTE — ED BEHAVIORAL HEALTH ASSESSMENT NOTE - PAST PSYCHOTROPIC MEDICATION
Risperdal 3mg BID, Cogentin 0.5mg BID and Depakote 500mg BID- as per record Risperdal 3mg BID, Cogentin 0.5mg BID and [id number] bid

## 2018-10-15 NOTE — ED ADULT NURSE NOTE - CHPI ED NUR SYMPTOMS NEG
no pain/no chills/no dizziness/no vomiting/no fever/no weakness/no nausea/no tingling/no decreased eating/drinking

## 2018-10-16 PROCEDURE — 90853 GROUP PSYCHOTHERAPY: CPT

## 2018-10-16 PROCEDURE — 99221 1ST HOSP IP/OBS SF/LOW 40: CPT | Mod: 25

## 2018-10-16 RX ORDER — ALBUTEROL 90 UG/1
2 AEROSOL, METERED ORAL EVERY 6 HOURS
Qty: 0 | Refills: 0 | Status: DISCONTINUED | OUTPATIENT
Start: 2018-10-16 | End: 2018-10-30

## 2018-10-16 RX ORDER — FLUPHENAZINE HYDROCHLORIDE 1 MG/1
5 TABLET, FILM COATED ORAL AT BEDTIME
Qty: 0 | Refills: 0 | Status: DISCONTINUED | OUTPATIENT
Start: 2018-10-16 | End: 2018-10-22

## 2018-10-16 RX ORDER — DIPHENHYDRAMINE HCL 50 MG
50 CAPSULE ORAL ONCE
Qty: 0 | Refills: 0 | Status: DISCONTINUED | OUTPATIENT
Start: 2018-10-16 | End: 2018-10-30

## 2018-10-16 RX ORDER — DIVALPROEX SODIUM 500 MG/1
750 TABLET, DELAYED RELEASE ORAL
Qty: 0 | Refills: 0 | Status: DISCONTINUED | OUTPATIENT
Start: 2018-10-16 | End: 2018-10-30

## 2018-10-16 RX ORDER — HALOPERIDOL DECANOATE 100 MG/ML
5 INJECTION INTRAMUSCULAR ONCE
Qty: 0 | Refills: 0 | Status: DISCONTINUED | OUTPATIENT
Start: 2018-10-16 | End: 2018-10-30

## 2018-10-16 RX ORDER — RISPERIDONE 4 MG/1
2 TABLET ORAL AT BEDTIME
Qty: 0 | Refills: 0 | Status: DISCONTINUED | OUTPATIENT
Start: 2018-10-16 | End: 2018-10-19

## 2018-10-16 RX ORDER — BENZTROPINE MESYLATE 1 MG
0.5 TABLET ORAL
Qty: 0 | Refills: 0 | Status: DISCONTINUED | OUTPATIENT
Start: 2018-10-16 | End: 2018-10-30

## 2018-10-16 RX ADMIN — RISPERIDONE 2 MILLIGRAM(S): 4 TABLET ORAL at 21:24

## 2018-10-16 RX ADMIN — FLUPHENAZINE HYDROCHLORIDE 5 MILLIGRAM(S): 1 TABLET, FILM COATED ORAL at 21:24

## 2018-10-16 RX ADMIN — DIVALPROEX SODIUM 750 MILLIGRAM(S): 500 TABLET, DELAYED RELEASE ORAL at 21:24

## 2018-10-16 RX ADMIN — DIVALPROEX SODIUM 500 MILLIGRAM(S): 500 TABLET, DELAYED RELEASE ORAL at 08:39

## 2018-10-16 RX ADMIN — Medication 0.5 MILLIGRAM(S): at 21:24

## 2018-10-16 RX ADMIN — RISPERIDONE 1 MILLIGRAM(S): 4 TABLET ORAL at 08:39

## 2018-10-17 PROCEDURE — 99232 SBSQ HOSP IP/OBS MODERATE 35: CPT

## 2018-10-17 RX ADMIN — Medication 0.5 MILLIGRAM(S): at 08:46

## 2018-10-17 RX ADMIN — DIVALPROEX SODIUM 750 MILLIGRAM(S): 500 TABLET, DELAYED RELEASE ORAL at 20:35

## 2018-10-17 RX ADMIN — DIVALPROEX SODIUM 750 MILLIGRAM(S): 500 TABLET, DELAYED RELEASE ORAL at 08:46

## 2018-10-17 RX ADMIN — Medication 0.5 MILLIGRAM(S): at 20:35

## 2018-10-17 RX ADMIN — RISPERIDONE 2 MILLIGRAM(S): 4 TABLET ORAL at 20:35

## 2018-10-17 RX ADMIN — FLUPHENAZINE HYDROCHLORIDE 5 MILLIGRAM(S): 1 TABLET, FILM COATED ORAL at 20:35

## 2018-10-18 PROCEDURE — 99232 SBSQ HOSP IP/OBS MODERATE 35: CPT | Mod: GC

## 2018-10-18 RX ADMIN — DIVALPROEX SODIUM 750 MILLIGRAM(S): 500 TABLET, DELAYED RELEASE ORAL at 21:23

## 2018-10-18 RX ADMIN — RISPERIDONE 2 MILLIGRAM(S): 4 TABLET ORAL at 21:24

## 2018-10-18 RX ADMIN — Medication 0.5 MILLIGRAM(S): at 21:23

## 2018-10-18 RX ADMIN — FLUPHENAZINE HYDROCHLORIDE 5 MILLIGRAM(S): 1 TABLET, FILM COATED ORAL at 21:23

## 2018-10-18 RX ADMIN — DIVALPROEX SODIUM 750 MILLIGRAM(S): 500 TABLET, DELAYED RELEASE ORAL at 08:58

## 2018-10-18 RX ADMIN — Medication 0.5 MILLIGRAM(S): at 08:58

## 2018-10-19 PROCEDURE — 99232 SBSQ HOSP IP/OBS MODERATE 35: CPT | Mod: GC

## 2018-10-19 RX ORDER — RISPERIDONE 4 MG/1
3 TABLET ORAL AT BEDTIME
Qty: 0 | Refills: 0 | Status: DISCONTINUED | OUTPATIENT
Start: 2018-10-19 | End: 2018-10-30

## 2018-10-19 RX ORDER — DIPHENHYDRAMINE HCL 50 MG
50 CAPSULE ORAL ONCE
Qty: 0 | Refills: 0 | Status: COMPLETED | OUTPATIENT
Start: 2018-10-19 | End: 2018-10-19

## 2018-10-19 RX ADMIN — Medication 0.5 MILLIGRAM(S): at 21:31

## 2018-10-19 RX ADMIN — RISPERIDONE 3 MILLIGRAM(S): 4 TABLET ORAL at 21:31

## 2018-10-19 RX ADMIN — Medication 50 MILLIGRAM(S): at 02:59

## 2018-10-19 RX ADMIN — Medication 0.5 MILLIGRAM(S): at 08:43

## 2018-10-19 RX ADMIN — FLUPHENAZINE HYDROCHLORIDE 5 MILLIGRAM(S): 1 TABLET, FILM COATED ORAL at 21:31

## 2018-10-19 RX ADMIN — DIVALPROEX SODIUM 750 MILLIGRAM(S): 500 TABLET, DELAYED RELEASE ORAL at 08:43

## 2018-10-19 RX ADMIN — DIVALPROEX SODIUM 750 MILLIGRAM(S): 500 TABLET, DELAYED RELEASE ORAL at 21:31

## 2018-10-20 PROCEDURE — 99232 SBSQ HOSP IP/OBS MODERATE 35: CPT

## 2018-10-20 RX ADMIN — Medication 0.5 MILLIGRAM(S): at 21:36

## 2018-10-20 RX ADMIN — DIVALPROEX SODIUM 750 MILLIGRAM(S): 500 TABLET, DELAYED RELEASE ORAL at 09:08

## 2018-10-20 RX ADMIN — Medication 0.5 MILLIGRAM(S): at 09:08

## 2018-10-20 RX ADMIN — DIVALPROEX SODIUM 750 MILLIGRAM(S): 500 TABLET, DELAYED RELEASE ORAL at 21:36

## 2018-10-20 RX ADMIN — RISPERIDONE 3 MILLIGRAM(S): 4 TABLET ORAL at 21:36

## 2018-10-20 RX ADMIN — FLUPHENAZINE HYDROCHLORIDE 5 MILLIGRAM(S): 1 TABLET, FILM COATED ORAL at 21:36

## 2018-10-21 PROCEDURE — 99232 SBSQ HOSP IP/OBS MODERATE 35: CPT

## 2018-10-21 RX ADMIN — Medication 0.5 MILLIGRAM(S): at 21:32

## 2018-10-21 RX ADMIN — Medication 2 MILLIGRAM(S): at 23:25

## 2018-10-21 RX ADMIN — FLUPHENAZINE HYDROCHLORIDE 5 MILLIGRAM(S): 1 TABLET, FILM COATED ORAL at 21:32

## 2018-10-21 RX ADMIN — RISPERIDONE 3 MILLIGRAM(S): 4 TABLET ORAL at 21:33

## 2018-10-21 RX ADMIN — DIVALPROEX SODIUM 750 MILLIGRAM(S): 500 TABLET, DELAYED RELEASE ORAL at 21:32

## 2018-10-21 RX ADMIN — Medication 0.5 MILLIGRAM(S): at 08:30

## 2018-10-21 RX ADMIN — DIVALPROEX SODIUM 750 MILLIGRAM(S): 500 TABLET, DELAYED RELEASE ORAL at 08:30

## 2018-10-22 PROCEDURE — 99232 SBSQ HOSP IP/OBS MODERATE 35: CPT | Mod: GC

## 2018-10-22 RX ORDER — FLUPHENAZINE HYDROCHLORIDE 1 MG/1
10 TABLET, FILM COATED ORAL AT BEDTIME
Qty: 0 | Refills: 0 | Status: DISCONTINUED | OUTPATIENT
Start: 2018-10-22 | End: 2018-10-23

## 2018-10-22 RX ADMIN — FLUPHENAZINE HYDROCHLORIDE 10 MILLIGRAM(S): 1 TABLET, FILM COATED ORAL at 21:13

## 2018-10-22 RX ADMIN — RISPERIDONE 3 MILLIGRAM(S): 4 TABLET ORAL at 21:28

## 2018-10-22 RX ADMIN — Medication 0.5 MILLIGRAM(S): at 09:30

## 2018-10-22 RX ADMIN — DIVALPROEX SODIUM 750 MILLIGRAM(S): 500 TABLET, DELAYED RELEASE ORAL at 21:13

## 2018-10-22 RX ADMIN — DIVALPROEX SODIUM 750 MILLIGRAM(S): 500 TABLET, DELAYED RELEASE ORAL at 09:30

## 2018-10-22 RX ADMIN — Medication 0.5 MILLIGRAM(S): at 21:13

## 2018-10-23 PROCEDURE — 99232 SBSQ HOSP IP/OBS MODERATE 35: CPT | Mod: GC

## 2018-10-23 RX ORDER — FLUPHENAZINE HYDROCHLORIDE 1 MG/1
10 TABLET, FILM COATED ORAL
Qty: 0 | Refills: 0 | Status: DISCONTINUED | OUTPATIENT
Start: 2018-10-23 | End: 2018-10-30

## 2018-10-23 RX ORDER — FLUPHENAZINE HYDROCHLORIDE 1 MG/1
20 TABLET, FILM COATED ORAL
Qty: 0 | Refills: 0 | Status: DISCONTINUED | OUTPATIENT
Start: 2018-10-23 | End: 2018-10-23

## 2018-10-23 RX ORDER — FLUPHENAZINE HYDROCHLORIDE 1 MG/1
50 TABLET, FILM COATED ORAL ONCE
Qty: 0 | Refills: 0 | Status: COMPLETED | OUTPATIENT
Start: 2018-10-23 | End: 2018-10-23

## 2018-10-23 RX ADMIN — DIVALPROEX SODIUM 750 MILLIGRAM(S): 500 TABLET, DELAYED RELEASE ORAL at 21:15

## 2018-10-23 RX ADMIN — DIVALPROEX SODIUM 750 MILLIGRAM(S): 500 TABLET, DELAYED RELEASE ORAL at 09:43

## 2018-10-23 RX ADMIN — Medication 0.5 MILLIGRAM(S): at 09:43

## 2018-10-23 RX ADMIN — RISPERIDONE 3 MILLIGRAM(S): 4 TABLET ORAL at 21:15

## 2018-10-23 RX ADMIN — FLUPHENAZINE HYDROCHLORIDE 50 MILLIGRAM(S): 1 TABLET, FILM COATED ORAL at 13:20

## 2018-10-23 RX ADMIN — FLUPHENAZINE HYDROCHLORIDE 10 MILLIGRAM(S): 1 TABLET, FILM COATED ORAL at 21:15

## 2018-10-23 RX ADMIN — Medication 0.5 MILLIGRAM(S): at 21:15

## 2018-10-24 LAB
CHOLEST SERPL-MCNC: 140 MG/DL — SIGNIFICANT CHANGE UP (ref 120–199)
HBA1C BLD-MCNC: 6.1 % — HIGH (ref 4–5.6)
HDLC SERPL-MCNC: 48 MG/DL — SIGNIFICANT CHANGE UP (ref 35–55)
LIPID PNL WITH DIRECT LDL SERPL: 78 MG/DL — SIGNIFICANT CHANGE UP
TRIGL SERPL-MCNC: 112 MG/DL — SIGNIFICANT CHANGE UP (ref 10–149)
VALPROATE SERPL-MCNC: 61.7 UG/ML — SIGNIFICANT CHANGE UP (ref 50–100)

## 2018-10-24 PROCEDURE — 99232 SBSQ HOSP IP/OBS MODERATE 35: CPT | Mod: GC

## 2018-10-24 RX ADMIN — RISPERIDONE 3 MILLIGRAM(S): 4 TABLET ORAL at 21:02

## 2018-10-24 RX ADMIN — DIVALPROEX SODIUM 750 MILLIGRAM(S): 500 TABLET, DELAYED RELEASE ORAL at 09:30

## 2018-10-24 RX ADMIN — FLUPHENAZINE HYDROCHLORIDE 10 MILLIGRAM(S): 1 TABLET, FILM COATED ORAL at 09:29

## 2018-10-24 RX ADMIN — DIVALPROEX SODIUM 750 MILLIGRAM(S): 500 TABLET, DELAYED RELEASE ORAL at 21:01

## 2018-10-24 RX ADMIN — FLUPHENAZINE HYDROCHLORIDE 10 MILLIGRAM(S): 1 TABLET, FILM COATED ORAL at 21:01

## 2018-10-24 RX ADMIN — Medication 0.5 MILLIGRAM(S): at 09:29

## 2018-10-24 RX ADMIN — Medication 0.5 MILLIGRAM(S): at 21:01

## 2018-10-25 PROCEDURE — 99232 SBSQ HOSP IP/OBS MODERATE 35: CPT | Mod: GC

## 2018-10-25 RX ADMIN — FLUPHENAZINE HYDROCHLORIDE 10 MILLIGRAM(S): 1 TABLET, FILM COATED ORAL at 08:58

## 2018-10-25 RX ADMIN — Medication 0.5 MILLIGRAM(S): at 08:58

## 2018-10-25 RX ADMIN — RISPERIDONE 3 MILLIGRAM(S): 4 TABLET ORAL at 20:51

## 2018-10-25 RX ADMIN — FLUPHENAZINE HYDROCHLORIDE 10 MILLIGRAM(S): 1 TABLET, FILM COATED ORAL at 20:51

## 2018-10-25 RX ADMIN — DIVALPROEX SODIUM 750 MILLIGRAM(S): 500 TABLET, DELAYED RELEASE ORAL at 08:58

## 2018-10-25 RX ADMIN — DIVALPROEX SODIUM 750 MILLIGRAM(S): 500 TABLET, DELAYED RELEASE ORAL at 20:51

## 2018-10-25 RX ADMIN — Medication 0.5 MILLIGRAM(S): at 20:51

## 2018-10-26 PROCEDURE — 99232 SBSQ HOSP IP/OBS MODERATE 35: CPT

## 2018-10-26 RX ADMIN — Medication 0.5 MILLIGRAM(S): at 08:41

## 2018-10-26 RX ADMIN — DIVALPROEX SODIUM 750 MILLIGRAM(S): 500 TABLET, DELAYED RELEASE ORAL at 08:41

## 2018-10-26 RX ADMIN — DIVALPROEX SODIUM 750 MILLIGRAM(S): 500 TABLET, DELAYED RELEASE ORAL at 21:39

## 2018-10-26 RX ADMIN — RISPERIDONE 3 MILLIGRAM(S): 4 TABLET ORAL at 21:39

## 2018-10-26 RX ADMIN — FLUPHENAZINE HYDROCHLORIDE 10 MILLIGRAM(S): 1 TABLET, FILM COATED ORAL at 21:39

## 2018-10-26 RX ADMIN — Medication 0.5 MILLIGRAM(S): at 21:39

## 2018-10-26 RX ADMIN — FLUPHENAZINE HYDROCHLORIDE 10 MILLIGRAM(S): 1 TABLET, FILM COATED ORAL at 08:41

## 2018-10-27 RX ADMIN — Medication 0.5 MILLIGRAM(S): at 21:23

## 2018-10-27 RX ADMIN — Medication 0.5 MILLIGRAM(S): at 08:40

## 2018-10-27 RX ADMIN — DIVALPROEX SODIUM 750 MILLIGRAM(S): 500 TABLET, DELAYED RELEASE ORAL at 08:40

## 2018-10-27 RX ADMIN — FLUPHENAZINE HYDROCHLORIDE 10 MILLIGRAM(S): 1 TABLET, FILM COATED ORAL at 08:40

## 2018-10-27 RX ADMIN — RISPERIDONE 3 MILLIGRAM(S): 4 TABLET ORAL at 21:23

## 2018-10-27 RX ADMIN — DIVALPROEX SODIUM 750 MILLIGRAM(S): 500 TABLET, DELAYED RELEASE ORAL at 21:23

## 2018-10-27 RX ADMIN — FLUPHENAZINE HYDROCHLORIDE 10 MILLIGRAM(S): 1 TABLET, FILM COATED ORAL at 21:23

## 2018-10-28 RX ORDER — BENZTROPINE MESYLATE 1 MG
1 TABLET ORAL
Qty: 28 | Refills: 0
Start: 2018-10-28 | End: 2018-11-10

## 2018-10-28 RX ORDER — FLUPHENAZINE HYDROCHLORIDE 1 MG/1
50 TABLET, FILM COATED ORAL
Qty: 0 | Refills: 0 | COMMUNITY

## 2018-10-28 RX ORDER — DIVALPROEX SODIUM 500 MG/1
3 TABLET, DELAYED RELEASE ORAL
Qty: 84 | Refills: 0
Start: 2018-10-28 | End: 2018-11-10

## 2018-10-28 RX ORDER — FLUPHENAZINE HYDROCHLORIDE 1 MG/1
1 TABLET, FILM COATED ORAL
Qty: 28 | Refills: 0
Start: 2018-10-28 | End: 2018-11-10

## 2018-10-28 RX ORDER — RISPERIDONE 4 MG/1
1 TABLET ORAL
Qty: 14 | Refills: 0
Start: 2018-10-28 | End: 2018-11-10

## 2018-10-28 RX ADMIN — RISPERIDONE 3 MILLIGRAM(S): 4 TABLET ORAL at 21:09

## 2018-10-28 RX ADMIN — FLUPHENAZINE HYDROCHLORIDE 10 MILLIGRAM(S): 1 TABLET, FILM COATED ORAL at 21:09

## 2018-10-28 RX ADMIN — FLUPHENAZINE HYDROCHLORIDE 10 MILLIGRAM(S): 1 TABLET, FILM COATED ORAL at 08:48

## 2018-10-28 RX ADMIN — Medication 0.5 MILLIGRAM(S): at 08:48

## 2018-10-28 RX ADMIN — DIVALPROEX SODIUM 750 MILLIGRAM(S): 500 TABLET, DELAYED RELEASE ORAL at 08:48

## 2018-10-28 RX ADMIN — Medication 0.5 MILLIGRAM(S): at 21:09

## 2018-10-28 RX ADMIN — DIVALPROEX SODIUM 750 MILLIGRAM(S): 500 TABLET, DELAYED RELEASE ORAL at 21:09

## 2018-10-29 PROCEDURE — 99232 SBSQ HOSP IP/OBS MODERATE 35: CPT | Mod: GC

## 2018-10-29 RX ADMIN — FLUPHENAZINE HYDROCHLORIDE 10 MILLIGRAM(S): 1 TABLET, FILM COATED ORAL at 21:02

## 2018-10-29 RX ADMIN — Medication 0.5 MILLIGRAM(S): at 21:02

## 2018-10-29 RX ADMIN — RISPERIDONE 3 MILLIGRAM(S): 4 TABLET ORAL at 21:02

## 2018-10-29 RX ADMIN — DIVALPROEX SODIUM 750 MILLIGRAM(S): 500 TABLET, DELAYED RELEASE ORAL at 08:39

## 2018-10-29 RX ADMIN — FLUPHENAZINE HYDROCHLORIDE 10 MILLIGRAM(S): 1 TABLET, FILM COATED ORAL at 08:39

## 2018-10-29 RX ADMIN — Medication 0.5 MILLIGRAM(S): at 08:39

## 2018-10-29 RX ADMIN — DIVALPROEX SODIUM 750 MILLIGRAM(S): 500 TABLET, DELAYED RELEASE ORAL at 21:02

## 2018-10-30 VITALS — TEMPERATURE: 97 F

## 2018-10-30 PROCEDURE — 99238 HOSP IP/OBS DSCHRG MGMT 30/<: CPT | Mod: GC

## 2018-10-30 RX ADMIN — DIVALPROEX SODIUM 750 MILLIGRAM(S): 500 TABLET, DELAYED RELEASE ORAL at 09:11

## 2018-10-30 RX ADMIN — Medication 0.5 MILLIGRAM(S): at 09:11

## 2018-10-30 RX ADMIN — FLUPHENAZINE HYDROCHLORIDE 10 MILLIGRAM(S): 1 TABLET, FILM COATED ORAL at 09:11

## 2019-03-19 ENCOUNTER — EMERGENCY (EMERGENCY)
Facility: HOSPITAL | Age: 59
LOS: 1 days | Discharge: HOME | End: 2019-03-19
Admitting: EMERGENCY MEDICINE
Payer: MEDICAID

## 2019-03-19 VITALS
HEART RATE: 76 BPM | DIASTOLIC BLOOD PRESSURE: 65 MMHG | OXYGEN SATURATION: 99 % | RESPIRATION RATE: 20 BRPM | SYSTOLIC BLOOD PRESSURE: 118 MMHG

## 2019-03-19 PROCEDURE — 99283 EMERGENCY DEPT VISIT LOW MDM: CPT

## 2019-03-19 NOTE — ED ADULT NURSE NOTE - NSIMPLEMENTINTERV_GEN_ALL_ED
Implemented All Universal Safety Interventions:  [place] to call system. Call bell, personal items and telephone within reach. Instruct patient to call for assistance. Room bathroom lighting operational. Non-slip footwear when patient is off stretcher. Physically safe environment: no spills, clutter or unnecessary equipment. Stretcher in lowest position, wheels locked, appropriate side rails in place.
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
## 2019-03-19 NOTE — ED ADULT NURSE REASSESSMENT NOTE - NS ED NURSE REASSESS COMMENT FT1
Patient evaluated and cleared by psych for discharge, pt given discharge instructions and left ER a&ox3 with metro card.

## 2019-03-19 NOTE — ED PROVIDER NOTE - CLINICAL SUMMARY MEDICAL DECISION MAKING FREE TEXT BOX
59 y/o male pt presents to  for psychiatric evaluation.  Physical examination completed and unremarkable for any acute issues/problems requiring immediate interventions.  PT cleared for discharge with [facility] information with discharge paperwork.
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
## 2019-03-19 NOTE — ED PROVIDER NOTE - PROGRESS NOTE DETAILS
Dr. Juan: As per hospital protocol, this patient was managed by the NP exclusively and I am signing as a matter of protocol.  I did not participate in the care of this patient, nor was I aware of the evaluation or plan until the time this chart was signed.

## 2019-03-28 ENCOUNTER — EMERGENCY (EMERGENCY)
Facility: HOSPITAL | Age: 59
LOS: 1 days | Discharge: LEFT BEFORE DISCHARGE | End: 2019-03-28
Admitting: EMERGENCY MEDICINE

## 2019-03-28 VITALS
DIASTOLIC BLOOD PRESSURE: 92 MMHG | RESPIRATION RATE: 16 BRPM | TEMPERATURE: 98 F | SYSTOLIC BLOOD PRESSURE: 137 MMHG | OXYGEN SATURATION: 100 % | HEART RATE: 108 BPM
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
## 2019-04-26 NOTE — ED ADULT NURSE NOTE - NSFALLRSKINDICATORS_ED_ALL_ED
Called pt about the lab appt, but I schedule pt for Monday at [facility] location at 11  ----- Message from Kary Summers sent at 4/26/2019  9:50 AM CDT -----  Contact: Self [phone]  PT is orders for labs prior to appointment. Pls schedule at [facility] today or Monday. Pt can be reached at [phone] to confirm.    
no

## 2020-02-11 NOTE — ED PROVIDER NOTE - CROS ED PSYCH ALL NEG
What Type Of Note Output Would You Prefer (Optional)?: Standard Output Hpi Title: Evaluation of Skin Lesions Have Your Spot(S) Been Treated In The Past?: has not been treated - - -

## 2020-03-21 ENCOUNTER — INPATIENT (INPATIENT)
Facility: HOSPITAL | Age: 60
LOS: 4 days | Discharge: HOME | End: 2020-03-26
Attending: HOSPITALIST | Admitting: HOSPITALIST
Payer: MEDICAID

## 2020-03-21 VITALS
TEMPERATURE: 99 F | DIASTOLIC BLOOD PRESSURE: 79 MMHG | SYSTOLIC BLOOD PRESSURE: 128 MMHG | RESPIRATION RATE: 18 BRPM | HEART RATE: 112 BPM | OXYGEN SATURATION: 100 %

## 2020-03-21 LAB
ALBUMIN SERPL ELPH-MCNC: 3.1 G/DL — LOW (ref 3.3–5)
ALP SERPL-CCNC: 72 U/L — SIGNIFICANT CHANGE UP (ref 40–120)
ALT FLD-CCNC: 11 U/L — SIGNIFICANT CHANGE UP (ref 4–41)
ANION GAP SERPL CALC-SCNC: 9 MMO/L — SIGNIFICANT CHANGE UP (ref 7–14)
APPEARANCE UR: CLEAR — SIGNIFICANT CHANGE UP
AST SERPL-CCNC: 17 U/L — SIGNIFICANT CHANGE UP (ref 4–40)
BASE EXCESS BLDV CALC-SCNC: 5.1 MMOL/L — SIGNIFICANT CHANGE UP
BASOPHILS # BLD AUTO: 0.04 K/UL — SIGNIFICANT CHANGE UP (ref 0–0.2)
BASOPHILS NFR BLD AUTO: 0.6 % — SIGNIFICANT CHANGE UP (ref 0–2)
BILIRUB SERPL-MCNC: < 0.2 MG/DL — LOW (ref 0.2–1.2)
BILIRUB UR-MCNC: NEGATIVE — SIGNIFICANT CHANGE UP
BLOOD GAS VENOUS - CREATININE: 0.86 MG/DL — SIGNIFICANT CHANGE UP (ref 0.5–1.3)
BLOOD GAS VENOUS - FIO2: 21 — SIGNIFICANT CHANGE UP
BLOOD UR QL VISUAL: NEGATIVE — SIGNIFICANT CHANGE UP
BUN SERPL-MCNC: 12 MG/DL — SIGNIFICANT CHANGE UP (ref 7–23)
CALCIUM SERPL-MCNC: 8.5 MG/DL — SIGNIFICANT CHANGE UP (ref 8.4–10.5)
CHLORIDE BLDV-SCNC: 102 MMOL/L — SIGNIFICANT CHANGE UP (ref 96–108)
CHLORIDE SERPL-SCNC: 98 MMOL/L — SIGNIFICANT CHANGE UP (ref 98–107)
CO2 SERPL-SCNC: 29 MMOL/L — SIGNIFICANT CHANGE UP (ref 22–31)
COLOR SPEC: SIGNIFICANT CHANGE UP
CREAT SERPL-MCNC: 0.83 MG/DL — SIGNIFICANT CHANGE UP (ref 0.5–1.3)
EOSINOPHIL # BLD AUTO: 0.55 K/UL — HIGH (ref 0–0.5)
EOSINOPHIL NFR BLD AUTO: 8.1 % — HIGH (ref 0–6)
GAS PNL BLDV: 136 MMOL/L — SIGNIFICANT CHANGE UP (ref 136–146)
GLUCOSE BLDV-MCNC: 148 MG/DL — HIGH (ref 70–99)
GLUCOSE SERPL-MCNC: 162 MG/DL — HIGH (ref 70–99)
GLUCOSE UR-MCNC: NEGATIVE — SIGNIFICANT CHANGE UP
HCO3 BLDV-SCNC: 28 MMOL/L — HIGH (ref 20–27)
HCT VFR BLD CALC: 37.3 % — LOW (ref 39–50)
HCT VFR BLDV CALC: 36.3 % — LOW (ref 39–51)
HGB BLD-MCNC: 11.4 G/DL — LOW (ref 13–17)
HGB BLDV-MCNC: 11.8 G/DL — LOW (ref 13–17)
IMM GRANULOCYTES NFR BLD AUTO: 0.3 % — SIGNIFICANT CHANGE UP (ref 0–1.5)
KETONES UR-MCNC: NEGATIVE — SIGNIFICANT CHANGE UP
LACTATE BLDV-MCNC: 1.9 MMOL/L — SIGNIFICANT CHANGE UP (ref 0.5–2)
LEUKOCYTE ESTERASE UR-ACNC: NEGATIVE — SIGNIFICANT CHANGE UP
LYMPHOCYTES # BLD AUTO: 1.29 K/UL — SIGNIFICANT CHANGE UP (ref 1–3.3)
LYMPHOCYTES # BLD AUTO: 18.9 % — SIGNIFICANT CHANGE UP (ref 13–44)
MCHC RBC-ENTMCNC: 29 PG — SIGNIFICANT CHANGE UP (ref 27–34)
MCHC RBC-ENTMCNC: 30.6 % — LOW (ref 32–36)
MCV RBC AUTO: 94.9 FL — SIGNIFICANT CHANGE UP (ref 80–100)
MONOCYTES # BLD AUTO: 0.97 K/UL — HIGH (ref 0–0.9)
MONOCYTES NFR BLD AUTO: 14.2 % — HIGH (ref 2–14)
NEUTROPHILS # BLD AUTO: 3.94 K/UL — SIGNIFICANT CHANGE UP (ref 1.8–7.4)
NEUTROPHILS NFR BLD AUTO: 57.9 % — SIGNIFICANT CHANGE UP (ref 43–77)
NITRITE UR-MCNC: NEGATIVE — SIGNIFICANT CHANGE UP
NRBC # FLD: 0 K/UL — SIGNIFICANT CHANGE UP (ref 0–0)
PCO2 BLDV: 57 MMHG — HIGH (ref 41–51)
PH BLDV: 7.35 PH — SIGNIFICANT CHANGE UP (ref 7.32–7.43)
PH UR: 6.5 — SIGNIFICANT CHANGE UP (ref 5–8)
PLATELET # BLD AUTO: 474 K/UL — HIGH (ref 150–400)
PMV BLD: 8.6 FL — SIGNIFICANT CHANGE UP (ref 7–13)
PO2 BLDV: 42 MMHG — HIGH (ref 35–40)
POTASSIUM BLDV-SCNC: 3.9 MMOL/L — SIGNIFICANT CHANGE UP (ref 3.4–4.5)
POTASSIUM SERPL-MCNC: 4 MMOL/L — SIGNIFICANT CHANGE UP (ref 3.5–5.3)
POTASSIUM SERPL-SCNC: 4 MMOL/L — SIGNIFICANT CHANGE UP (ref 3.5–5.3)
PROT SERPL-MCNC: 6.9 G/DL — SIGNIFICANT CHANGE UP (ref 6–8.3)
PROT UR-MCNC: NEGATIVE — SIGNIFICANT CHANGE UP
RBC # BLD: 3.93 M/UL — LOW (ref 4.2–5.8)
RBC # FLD: 13.6 % — SIGNIFICANT CHANGE UP (ref 10.3–14.5)
SAO2 % BLDV: 70.5 % — SIGNIFICANT CHANGE UP (ref 60–85)
SODIUM SERPL-SCNC: 136 MMOL/L — SIGNIFICANT CHANGE UP (ref 135–145)
SP GR SPEC: 1.02 — SIGNIFICANT CHANGE UP (ref 1–1.04)
UROBILINOGEN FLD QL: NORMAL — SIGNIFICANT CHANGE UP
WBC # BLD: 6.81 K/UL — SIGNIFICANT CHANGE UP (ref 3.8–10.5)
WBC # FLD AUTO: 6.81 K/UL — SIGNIFICANT CHANGE UP (ref 3.8–10.5)

## 2020-03-21 PROCEDURE — 71045 X-RAY EXAM CHEST 1 VIEW: CPT | Mod: 26

## 2020-03-21 RX ORDER — SODIUM CHLORIDE 9 MG/ML
1000 INJECTION INTRAMUSCULAR; INTRAVENOUS; SUBCUTANEOUS ONCE
Refills: 0 | Status: COMPLETED | OUTPATIENT
Start: 2020-03-21 | End: 2020-03-21

## 2020-03-21 RX ORDER — ACETAMINOPHEN 500 MG
650 TABLET ORAL ONCE
Refills: 0 | Status: COMPLETED | OUTPATIENT
Start: 2020-03-21 | End: 2020-03-21

## 2020-03-21 RX ADMIN — SODIUM CHLORIDE 1000 MILLILITER(S): 9 INJECTION INTRAMUSCULAR; INTRAVENOUS; SUBCUTANEOUS at 21:37

## 2020-03-21 RX ADMIN — Medication 650 MILLIGRAM(S): at 21:37

## 2020-03-21 NOTE — ED ADULT NURSE REASSESSMENT NOTE - NS ED NURSE REASSESS COMMENT FT1
in hallway; keeps getting up to use urinal; heart rate 128-130; md aware; will continue to monitor; stated that the only thing that is bothering him is his legs

## 2020-03-21 NOTE — ED PROVIDER NOTE - ATTENDING CONTRIBUTION TO CARE
I performed a face to face bedside interview with patient regarding history of present illness, review of symptoms and past medical history. I completed an independent physical exam.  I have discussed patient's plan of care.   I agree with note as stated above, having amended the EMR as needed to reflect my findings. I have discussed the assessment and plan of care.  This includes during the time I functioned as the attending physician for this patient.  Attending Contribution to Care: agree with plan of resident. 59M pmh schizophrenia presents with one month of worsening foot pain x1 month. Patient is able to ambulate. Pain is localized to feet and ankles.  Patient has wounds to ankles that are weeping clear fluid. Patient has swollen feet and ankles. Pts has superficial erosion to skin of bottom of both feet. Patient has not been taking anything for the pain or caring for his foot wounds. Patient went to urgent care today who gave him a dose of antibiotics and sent to ED for evaluation. Denies fever chills. Has cough which patient says is chronic because he smokes. No shortness of breath. Denies SI/HI. Patient states he has a legal address but does not want to disclose if he has a place to sleep or not tonight? Patient has psychiatric medications at bedside which he states have been filled by PCP and he has been taking them.

## 2020-03-21 NOTE — ED PROVIDER NOTE - PHYSICAL EXAMINATION
Physical Exam:    I have reviewed the triage vital signs.    Gen: NAD, AOx3, non-toxic appearing, able to ambulate without assistance  Head and Neck: NCAT, Neck supple without meningismus   HEENT: EOMI, PEERLA, normal conjunctiva, tongue midline, oral mucosa moist  Lung: CTAB, no respiratory distress, no wheezes/rhonchi/rales B/L, speaking in full sentences bilateral rhonchi.   CV: RRR, no murmurs, rubs or gallops  Abd: soft, NT, ND, no guarding, no rigidity, no rebound tenderness, no CVA tenderness, no masses.   MSK: no gross deformities, ROM normal in UE/LE, no back tenderness  Neuro: CNs II-XII grossly intact. No focal sensory or motor deficits  Skin: Warm, well perfused, no rash, no leg swelling  Psych: Appropriate mood and affect Physical Exam:    I have reviewed the triage vital signs.    Gen: NAD, AOx3, non-toxic appearing, able to ambulate without assistance  Head and Neck: NCAT, Neck supple without meningismus   HEENT: EOMI, PEERLA, normal conjunctiva, tongue midline, oral mucosa moist  Lung: CTAB, no respiratory distress, no wheezes/rhonchi/rales B/L, speaking in full sentences bilateral rhonchi.   CV: RRR, no murmurs, rubs or gallops  Abd: soft, NT, ND, no guarding, no rigidity, no rebound tenderness, no CVA tenderness, no masses.   MSK: no gross deformities, ROM normal in UE/LE, no back tenderness  Neuro: CNs II-XII grossly intact. No focal sensory or motor deficits  Skin: Warm, well perfused, no rash, bilateral ankle swelling, multiple wounds on ankles that are open, weeping with macerated skin, not gangrenous however. Underside of both feet have skin erosion.  Psych: Appropriate mood and affect

## 2020-03-21 NOTE — ED PROVIDER NOTE - CLINICAL SUMMARY MEDICAL DECISION MAKING FREE TEXT BOX
[place]: Adult male presents with chonic foot wounds possible cellulitis. Was antibiosed at  prior to arrival. Patient is noted to be tachycardic and desturating with exertion. Patient has chronic cough but appears congested. Afebrile. Homeless. Plan to CXR, labs, Fluid and reassess. [place]: Adult male presents with chonic foot wounds possibly cellulitis. Was antibiosed at  prior to arrival. Patient is noted to be tachycardic and desturating with exertion. Patient has chronic cough but appears congested. Afebrile. Homeless. Plan to CXR, labs, Fluid and reassess.

## 2020-03-21 NOTE — ED PROVIDER NOTE - NS ED ROS FT
Gen: No fever, normal appetite  Eyes: No eye irritation or discharge  ENT: No ear pain, congestion, sore throat  Resp: No cough or trouble breathing  Cardiovascular: No chest pain or palpitation  Gastroenteric: No nausea/vomiting, diarrhea, constipation  :  No change in urine output; no dysuria  MS: No joint or muscle pain  Skin: No rashes. + foot wounds   Neuro: No headache; no abnormal movements  Remainder negative, except as per the HPI

## 2020-03-21 NOTE — ED PROVIDER NOTE - OBJECTIVE STATEMENT
59M pmh schizophrenia presents with one month of worsening foot pain x1 month. Patient is able to ambulate. Pain is localized to feet and ankles.  Patient has wounds to ankles that are weeping clear fluid. Patient has swollen feet and ankles. Pts has superficial erosion to skin of bottom of both feet. Patient has not been taking anything for the pain or caring for his foot wounds. Patient went to urgent care today who gave him a dose of antibiotics and sent to ED for evaluation.   Denies fever chills. Has cough which patient says is chronic because he smokes. No shortness of breath. Denies SI/HI. Patient states he has a legal address but does not want to disclose if he has a place to sleep or not tonight? Patient has psychiatric medications at bedside which he states have been filled by PCP and he has been taking them.

## 2020-03-21 NOTE — ED ADULT NURSE NOTE - OBJECTIVE STATEMENT
came into er from urgent care stated that he is homeless and he just cannot walk anymore; stated history of diabetes htn; stated that he smokes cigarettes but only a few a day and always has a chronic moist cough and frequently brings up clear sputum; denies any drug use and stated only drinks on social occasion which is rare; stated that the only reason he came in is because he can no longer walk; has lower leg redness and swelling with ulcers bilat feet and also some swelling in his hands

## 2020-03-21 NOTE — ED ADULT TRIAGE NOTE - CHIEF COMPLAINT QUOTE
pt sent from urgent care, homeless, reports untreated foot ulcers for a "long time" as per EMS pt is noted to have lice in his hair, notified charge RN who advised to put surgical cap and will decom when available. wet cough noted but no hypoxia.

## 2020-03-22 DIAGNOSIS — R09.02 HYPOXEMIA: ICD-10-CM

## 2020-03-22 DIAGNOSIS — Z71.89 OTHER SPECIFIED COUNSELING: ICD-10-CM

## 2020-03-22 DIAGNOSIS — Z29.9 ENCOUNTER FOR PROPHYLACTIC MEASURES, UNSPECIFIED: ICD-10-CM

## 2020-03-22 DIAGNOSIS — Z79.899 OTHER LONG TERM (CURRENT) DRUG THERAPY: ICD-10-CM

## 2020-03-22 DIAGNOSIS — E11.9 TYPE 2 DIABETES MELLITUS WITHOUT COMPLICATIONS: ICD-10-CM

## 2020-03-22 DIAGNOSIS — S91.009D UNSPECIFIED OPEN WOUND, UNSPECIFIED ANKLE, SUBSEQUENT ENCOUNTER: ICD-10-CM

## 2020-03-22 DIAGNOSIS — S91.009A UNSPECIFIED OPEN WOUND, UNSPECIFIED ANKLE, INITIAL ENCOUNTER: ICD-10-CM

## 2020-03-22 DIAGNOSIS — F25.9 SCHIZOAFFECTIVE DISORDER, UNSPECIFIED: ICD-10-CM

## 2020-03-22 LAB
ANION GAP SERPL CALC-SCNC: 10 MMO/L — SIGNIFICANT CHANGE UP (ref 7–14)
B PERT DNA SPEC QL NAA+PROBE: NOT DETECTED — SIGNIFICANT CHANGE UP
BUN SERPL-MCNC: 10 MG/DL — SIGNIFICANT CHANGE UP (ref 7–23)
C PNEUM DNA SPEC QL NAA+PROBE: NOT DETECTED — SIGNIFICANT CHANGE UP
CALCIUM SERPL-MCNC: 9.1 MG/DL — SIGNIFICANT CHANGE UP (ref 8.4–10.5)
CHLORIDE SERPL-SCNC: 98 MMOL/L — SIGNIFICANT CHANGE UP (ref 98–107)
CO2 SERPL-SCNC: 30 MMOL/L — SIGNIFICANT CHANGE UP (ref 22–31)
CREAT SERPL-MCNC: 0.84 MG/DL — SIGNIFICANT CHANGE UP (ref 0.5–1.3)
CRP SERPL-MCNC: 34.6 MG/L — HIGH
D DIMER BLD IA.RAPID-MCNC: 3565 NG/ML — SIGNIFICANT CHANGE UP
ERYTHROCYTE [SEDIMENTATION RATE] IN BLOOD: 38 MM/HR — HIGH (ref 1–15)
FERRITIN SERPL-MCNC: 179.9 NG/ML — SIGNIFICANT CHANGE UP (ref 30–400)
FLUAV H1 2009 PAND RNA SPEC QL NAA+PROBE: NOT DETECTED — SIGNIFICANT CHANGE UP
FLUAV H1 RNA SPEC QL NAA+PROBE: NOT DETECTED — SIGNIFICANT CHANGE UP
FLUAV H3 RNA SPEC QL NAA+PROBE: NOT DETECTED — SIGNIFICANT CHANGE UP
FLUAV SUBTYP SPEC NAA+PROBE: NOT DETECTED — SIGNIFICANT CHANGE UP
FLUBV RNA SPEC QL NAA+PROBE: NOT DETECTED — SIGNIFICANT CHANGE UP
GLUCOSE BLDC GLUCOMTR-MCNC: 107 MG/DL — HIGH (ref 70–99)
GLUCOSE BLDC GLUCOMTR-MCNC: 128 MG/DL — HIGH (ref 70–99)
GLUCOSE BLDC GLUCOMTR-MCNC: 95 MG/DL — SIGNIFICANT CHANGE UP (ref 70–99)
GLUCOSE BLDC GLUCOMTR-MCNC: 96 MG/DL — SIGNIFICANT CHANGE UP (ref 70–99)
GLUCOSE SERPL-MCNC: 115 MG/DL — HIGH (ref 70–99)
HADV DNA SPEC QL NAA+PROBE: NOT DETECTED — SIGNIFICANT CHANGE UP
HCOV PNL SPEC NAA+PROBE: SIGNIFICANT CHANGE UP
HCT VFR BLD CALC: 39.7 % — SIGNIFICANT CHANGE UP (ref 39–50)
HGB BLD-MCNC: 12.4 G/DL — LOW (ref 13–17)
HMPV RNA SPEC QL NAA+PROBE: NOT DETECTED — SIGNIFICANT CHANGE UP
HPIV1 RNA SPEC QL NAA+PROBE: NOT DETECTED — SIGNIFICANT CHANGE UP
HPIV2 RNA SPEC QL NAA+PROBE: NOT DETECTED — SIGNIFICANT CHANGE UP
HPIV3 RNA SPEC QL NAA+PROBE: NOT DETECTED — SIGNIFICANT CHANGE UP
HPIV4 RNA SPEC QL NAA+PROBE: NOT DETECTED — SIGNIFICANT CHANGE UP
LDH SERPL L TO P-CCNC: 253 U/L — HIGH (ref 135–225)
MAGNESIUM SERPL-MCNC: 1.9 MG/DL — SIGNIFICANT CHANGE UP (ref 1.6–2.6)
MCHC RBC-ENTMCNC: 29.6 PG — SIGNIFICANT CHANGE UP (ref 27–34)
MCHC RBC-ENTMCNC: 31.2 % — LOW (ref 32–36)
MCV RBC AUTO: 94.7 FL — SIGNIFICANT CHANGE UP (ref 80–100)
NRBC # FLD: 0 K/UL — SIGNIFICANT CHANGE UP (ref 0–0)
PHOSPHATE SERPL-MCNC: 3.5 MG/DL — SIGNIFICANT CHANGE UP (ref 2.5–4.5)
PLATELET # BLD AUTO: 557 K/UL — HIGH (ref 150–400)
PMV BLD: 8.9 FL — SIGNIFICANT CHANGE UP (ref 7–13)
POTASSIUM SERPL-MCNC: 4.3 MMOL/L — SIGNIFICANT CHANGE UP (ref 3.5–5.3)
POTASSIUM SERPL-SCNC: 4.3 MMOL/L — SIGNIFICANT CHANGE UP (ref 3.5–5.3)
RBC # BLD: 4.19 M/UL — LOW (ref 4.2–5.8)
RBC # FLD: 13.8 % — SIGNIFICANT CHANGE UP (ref 10.3–14.5)
RSV RNA SPEC QL NAA+PROBE: NOT DETECTED — SIGNIFICANT CHANGE UP
RV+EV RNA SPEC QL NAA+PROBE: NOT DETECTED — SIGNIFICANT CHANGE UP
SARS-COV-2 RNA SPEC QL NAA+PROBE: SIGNIFICANT CHANGE UP
SODIUM SERPL-SCNC: 138 MMOL/L — SIGNIFICANT CHANGE UP (ref 135–145)
WBC # BLD: 8.39 K/UL — SIGNIFICANT CHANGE UP (ref 3.8–10.5)
WBC # FLD AUTO: 8.39 K/UL — SIGNIFICANT CHANGE UP (ref 3.8–10.5)

## 2020-03-22 PROCEDURE — 99223 1ST HOSP IP/OBS HIGH 75: CPT | Mod: GC

## 2020-03-22 PROCEDURE — 12345: CPT | Mod: NC

## 2020-03-22 PROCEDURE — 71260 CT THORAX DX C+: CPT | Mod: 26

## 2020-03-22 PROCEDURE — 71275 CT ANGIOGRAPHY CHEST: CPT | Mod: 26

## 2020-03-22 RX ORDER — INSULIN LISPRO 100/ML
VIAL (ML) SUBCUTANEOUS AT BEDTIME
Refills: 0 | Status: DISCONTINUED | OUTPATIENT
Start: 2020-03-22 | End: 2020-03-26

## 2020-03-22 RX ORDER — DEXTROSE 50 % IN WATER 50 %
25 SYRINGE (ML) INTRAVENOUS ONCE
Refills: 0 | Status: DISCONTINUED | OUTPATIENT
Start: 2020-03-22 | End: 2020-03-26

## 2020-03-22 RX ORDER — BENZTROPINE MESYLATE 1 MG
0.5 TABLET ORAL
Refills: 0 | Status: DISCONTINUED | OUTPATIENT
Start: 2020-03-22 | End: 2020-03-26

## 2020-03-22 RX ORDER — INSULIN LISPRO 100/ML
VIAL (ML) SUBCUTANEOUS
Refills: 0 | Status: DISCONTINUED | OUTPATIENT
Start: 2020-03-22 | End: 2020-03-26

## 2020-03-22 RX ORDER — DEXTROSE 50 % IN WATER 50 %
15 SYRINGE (ML) INTRAVENOUS ONCE
Refills: 0 | Status: DISCONTINUED | OUTPATIENT
Start: 2020-03-22 | End: 2020-03-26

## 2020-03-22 RX ORDER — SODIUM CHLORIDE 9 MG/ML
1000 INJECTION, SOLUTION INTRAVENOUS
Refills: 0 | Status: DISCONTINUED | OUTPATIENT
Start: 2020-03-22 | End: 2020-03-26

## 2020-03-22 RX ORDER — DEXTROSE 50 % IN WATER 50 %
12.5 SYRINGE (ML) INTRAVENOUS ONCE
Refills: 0 | Status: DISCONTINUED | OUTPATIENT
Start: 2020-03-22 | End: 2020-03-26

## 2020-03-22 RX ORDER — HEPARIN SODIUM 5000 [USP'U]/ML
5000 INJECTION INTRAVENOUS; SUBCUTANEOUS EVERY 12 HOURS
Refills: 0 | Status: DISCONTINUED | OUTPATIENT
Start: 2020-03-22 | End: 2020-03-26

## 2020-03-22 RX ORDER — ALBUTEROL 90 UG/1
2 AEROSOL, METERED ORAL EVERY 6 HOURS
Refills: 0 | Status: DISCONTINUED | OUTPATIENT
Start: 2020-03-22 | End: 2020-03-26

## 2020-03-22 RX ORDER — DIVALPROEX SODIUM 500 MG/1
750 TABLET, DELAYED RELEASE ORAL
Refills: 0 | Status: DISCONTINUED | OUTPATIENT
Start: 2020-03-22 | End: 2020-03-26

## 2020-03-22 RX ORDER — RISPERIDONE 4 MG/1
1 TABLET ORAL
Refills: 0 | Status: DISCONTINUED | OUTPATIENT
Start: 2020-03-22 | End: 2020-03-26

## 2020-03-22 RX ORDER — ACETAMINOPHEN 500 MG
650 TABLET ORAL EVERY 6 HOURS
Refills: 0 | Status: DISCONTINUED | OUTPATIENT
Start: 2020-03-22 | End: 2020-03-26

## 2020-03-22 RX ORDER — FLUPHENAZINE HYDROCHLORIDE 1 MG/1
5 TABLET, FILM COATED ORAL
Refills: 0 | Status: DISCONTINUED | OUTPATIENT
Start: 2020-03-22 | End: 2020-03-26

## 2020-03-22 RX ORDER — FUROSEMIDE 40 MG
40 TABLET ORAL ONCE
Refills: 0 | Status: COMPLETED | OUTPATIENT
Start: 2020-03-22 | End: 2020-03-22

## 2020-03-22 RX ORDER — FLUPHENAZINE HYDROCHLORIDE 1 MG/1
50 TABLET, FILM COATED ORAL
Qty: 0 | Refills: 0 | DISCHARGE

## 2020-03-22 RX ORDER — TIOTROPIUM BROMIDE 18 UG/1
1 CAPSULE ORAL; RESPIRATORY (INHALATION) DAILY
Refills: 0 | Status: DISCONTINUED | OUTPATIENT
Start: 2020-03-22 | End: 2020-03-26

## 2020-03-22 RX ORDER — GLUCAGON INJECTION, SOLUTION 0.5 MG/.1ML
1 INJECTION, SOLUTION SUBCUTANEOUS ONCE
Refills: 0 | Status: DISCONTINUED | OUTPATIENT
Start: 2020-03-22 | End: 2020-03-26

## 2020-03-22 RX ADMIN — Medication 650 MILLIGRAM(S): at 11:13

## 2020-03-22 RX ADMIN — RISPERIDONE 1 MILLIGRAM(S): 4 TABLET ORAL at 17:38

## 2020-03-22 RX ADMIN — FLUPHENAZINE HYDROCHLORIDE 5 MILLIGRAM(S): 1 TABLET, FILM COATED ORAL at 17:39

## 2020-03-22 RX ADMIN — Medication 40 MILLIGRAM(S): at 02:51

## 2020-03-22 RX ADMIN — DIVALPROEX SODIUM 750 MILLIGRAM(S): 500 TABLET, DELAYED RELEASE ORAL at 17:39

## 2020-03-22 RX ADMIN — Medication 0.5 MILLIGRAM(S): at 17:39

## 2020-03-22 RX ADMIN — Medication 650 MILLIGRAM(S): at 11:46

## 2020-03-22 RX ADMIN — TIOTROPIUM BROMIDE 1 CAPSULE(S): 18 CAPSULE ORAL; RESPIRATORY (INHALATION) at 11:12

## 2020-03-22 RX ADMIN — HEPARIN SODIUM 5000 UNIT(S): 5000 INJECTION INTRAVENOUS; SUBCUTANEOUS at 07:49

## 2020-03-22 RX ADMIN — HEPARIN SODIUM 5000 UNIT(S): 5000 INJECTION INTRAVENOUS; SUBCUTANEOUS at 17:38

## 2020-03-22 RX ADMIN — ALBUTEROL 2 PUFF(S): 90 AEROSOL, METERED ORAL at 11:12

## 2020-03-22 NOTE — PROGRESS NOTE ADULT - PROBLEM SELECTOR PLAN 7
refusing NC in ED, stated he would not want to be intubated.  However patient seems to have poor insight into situation and therefore, needs further evaluation prior to DNR/DNI.  Obtain collateral from family, ?guardian

## 2020-03-22 NOTE — ED ADULT NURSE REASSESSMENT NOTE - NS ED NURSE REASSESS COMMENT FT1
Pt o2 sat noted to be 85%. pt refusing to use nasal canula or NRB stating I don't need it" pt becoming agitated and yelling at staff. MAR called and made aware and at bedside evaluating the pt.

## 2020-03-22 NOTE — H&P ADULT - ATTENDING COMMENTS
Patient seen and examined on 3/22/2020, case discussed with Dr. Ana Alejo, agree with assessment and plan as above.

## 2020-03-22 NOTE — H&P ADULT - PROBLEM SELECTOR PLAN 3
Pt with schizoaccfffect disoder.    - Pt, only had fluphenazine BID in bag  - consult psych in am for medication help Patient with concern for possible heart failure given lower extremity swelling, pulmonary edema.  - will obtain echo to evaluate heart further  - strict i/o  - diurese as appropriate

## 2020-03-22 NOTE — CHART NOTE - NSCHARTNOTEFT_GEN_A_CORE
D-dimer noted to be elevated.  LE dopplers & CTPA ordered to r/o DVT & PE.  Discussed with attending MD Dr. Guerra.

## 2020-03-22 NOTE — H&P ADULT - PROBLEM SELECTOR PLAN 5
SQH  Diabetic diet Patient will need clarification of meds in AM as patient states he is taking far fewer medications than listed.

## 2020-03-22 NOTE — H&P ADULT - PROBLEM SELECTOR PROBLEM 4
Type 2 diabetes mellitus without complication, without long-term current use of insulin Schizoaffective disorder

## 2020-03-22 NOTE — H&P ADULT - HISTORY OF PRESENT ILLNESS
59M PMH schizoaffective disorder presenting with swelling in legs x 5 months.  Patient states that he has been having worsening swelling in his legs since his sister .  Patient states he went to Urgent Care today for evaluation of his legs.  Because he was having a cough, he was sent to ED for further care.  Denies fevers, chills at this time.  Patient states he has a long standing cough, but his legs are what are bothering him the most.      In the ED, patient was tachycardic to 112.  Patient was given lasix 40 mg in the ED with 2L output per nurse.  Patient was desatting to 85% while in ED and was placed on NC.  Patient stated he did not want to be intubated should he require this and was made DNR/DNI.    At time of exam, patient denied fevers, chills, nausea, vomiting.  Complained that he could not walk and legs hurt. 59M PM schizoaffective disorder presenting with swelling in legs x 5 months.  Patient states that he has been having worsening swelling in his legs since his sister .  Patient states he went to Urgent Care today for evaluation of his legs.  Because he was having a cough, he was sent to ED for further care.  Denies fevers, chills at this time.  Patient states he has a long standing cough, but his legs are what are bothering him the most.      In the ED, patient was tachycardic to 112.  Patient was given lasix 40 mg in the ED with 2L output per nurse.  Patient was desatting to 85% while in ED and was placed on NC.     At time of exam, patient denied fevers, chills, nausea, vomiting.  Complained that he could not walk and legs hurt.

## 2020-03-22 NOTE — PROGRESS NOTE ADULT - PROBLEM SELECTOR PLAN 4
schizo-affective disorer.    -Team discussed meds with psych team and resumed fluphenazine, risperdal, depakote per psych  -Will call official psych consult in AM

## 2020-03-22 NOTE — H&P ADULT - PROBLEM SELECTOR PLAN 8
Patient was refusing NC in ED, stated he would not want to be intubated.  However patient seems to have poor insight into situation and therefore, needs further evaluation prior to DNR/DNI.

## 2020-03-22 NOTE — H&P ADULT - PROBLEM SELECTOR PLAN 6
Patient will need clarification of meds from outside hospital in [facility] Pt with HbA1c 6.5 at last visit; not on home meds  - will start ISS in hospital

## 2020-03-22 NOTE — H&P ADULT - NSHPLABSRESULTS_GEN_ALL_CORE
136  |  98  |  12  ----------------------------<  162<H>  4.0   |  29  |  0.83    Ca    8.5      21 Mar 2020 20:39    TPro  6.9  /  Alb  3.1<L>  /  TBili  < 0.2<L>  /  DBili  x   /  AST  17  /  ALT  11  /  AlkPhos  72                      Urinalysis Basic - ( 21 Mar 2020 20:58 )    Color: LIGHT YELLOW / Appearance: CLEAR / S.017 / pH: 6.5  Gluc: NEGATIVE / Ketone: NEGATIVE  / Bili: NEGATIVE / Urobili: NORMAL   Blood: NEGATIVE / Protein: NEGATIVE / Nitrite: NEGATIVE   Leuk Esterase: NEGATIVE / RBC: x / WBC x   Sq Epi: x / Non Sq Epi: x / Bacteria: x                              11.4   6.81  )-----------( 474      ( 21 Mar 2020 20:39 )             37.3     CAPILLARY BLOOD GLUCOSE      CXR reviewed:  clear lungs  CT chest reviewed:  mild ground glass opacities.     136  |  98  |  12  ----------------------------<  162<H>  4.0   |  29  |  0.83    Ca    8.5      21 Mar 2020 20:39    TPro  6.9  /  Alb  3.1<L>  /  TBili  < 0.2<L>  /  DBili  x   /  AST  17  /  ALT  11  /  AlkPhos  72                Urinalysis Basic - ( 21 Mar 2020 20:58 )  Color: LIGHT YELLOW / Appearance: CLEAR / S.017 / pH: 6.5  Gluc: NEGATIVE / Ketone: NEGATIVE  / Bili: NEGATIVE / Urobili: NORMAL   Blood: NEGATIVE / Protein: NEGATIVE / Nitrite: NEGATIVE   Leuk Esterase: NEGATIVE / RBC: x / WBC x   Sq Epi: x / Non Sq Epi: x / Bacteria: x                        11.4   6.81  )-----------( 474      ( 21 Mar 2020 20:39 )             37.3     Serum Pro-Brain Natriuretic Peptide (20 @ 20:39)    Serum Pro-Brain Natriuretic Peptide: 1341    CXR reviewed:  clear lungs  CT chest reviewed:  mild ground glass opacities.    EKG -     136  |  98  |  12  ----------------------------<  162<H>  4.0   |  29  |  0.83    Ca    8.5      21 Mar 2020 20:39    TPro  6.9  /  Alb  3.1<L>  /  TBili  < 0.2<L>  /  DBili  x   /  AST  17  /  ALT  11  /  AlkPhos  72                Urinalysis Basic - ( 21 Mar 2020 20:58 )  Color: LIGHT YELLOW / Appearance: CLEAR / S.017 / pH: 6.5  Gluc: NEGATIVE / Ketone: NEGATIVE  / Bili: NEGATIVE / Urobili: NORMAL   Blood: NEGATIVE / Protein: NEGATIVE / Nitrite: NEGATIVE   Leuk Esterase: NEGATIVE / RBC: x / WBC x   Sq Epi: x / Non Sq Epi: x / Bacteria: x                        11.4   6.81  )-----------( 474      ( 21 Mar 2020 20:39 )             37.3     Serum Pro-Brain Natriuretic Peptide (20 @ 20:39)    Serum Pro-Brain Natriuretic Peptide: 1341    CXR reviewed:  clear lungs  CT chest reviewed:  mild ground glass opacities.    EKG - Sinus tach at 124, QRS 84, QTc 451, TWI V6, otherwise no significant ST-T wave changes

## 2020-03-22 NOTE — PROVIDER CONTACT NOTE (OTHER) - BACKGROUND
Patient presented with lower extremity swelling and cough. Pt with a history of schizoaffective disorder.

## 2020-03-22 NOTE — PROGRESS NOTE ADULT - PROBLEM SELECTOR PLAN 1
Resolved, s/p IV Lasix in ED, was on NC, currently off, denying any SOB   CT reviewed, RVP negative   -COVID pending  -c/w albuterol and ipratropium  -Ddimer noted to be ~3K and pt tachycardic, CT to be repeated with PE protocol to r/o thrombosis  -TTE pending to evaluate cardiac function

## 2020-03-22 NOTE — H&P ADULT - ASSESSMENT
59M PMH schizoaffective disorder presenting with hyoxia and increased leg swelling.  Less likley to be CHF, however diuresis has helped.  Given hypoxia, concern for COVID.

## 2020-03-22 NOTE — H&P ADULT - NSHPSOCIALHISTORY_GEN_ALL_CORE
Current every day smoker Current every day smoker  Used to live with sister  Denies etoh  smokers marijuana Current every day smoker, smoked 1 ppd  Used to live with sister  Denies etoh  smokers marijuana

## 2020-03-22 NOTE — H&P ADULT - PROBLEM SELECTOR PROBLEM 6
Medication management Type 2 diabetes mellitus without complication, without long-term current use of insulin

## 2020-03-22 NOTE — H&P ADULT - PROBLEM SELECTOR PLAN 2
Pt with chronic whoudl of ansewrs, bilaterally  - Pt could beneft for [place], will consult vascular in Am Pt with chronic wounds bilaterally, unlikely to be infected  - will hold off on abx  - swelling improved with lasix  - Pt could benefit for unnaboot, will consult vascular in Am

## 2020-03-22 NOTE — H&P ADULT - NSHPREVIEWOFSYSTEMS_GEN_ALL_CORE
REVIEW OF SYSTEMS:    CONSTITUTIONAL: No weakness, fevers or chills  EYES/ENT: No visual changes;  No vertigo or throat pain   NECK: No pain or stiffness  RESPIRATORY: No wheezing, hemoptysis; No shortness of breath  CARDIOVASCULAR: No chest pain or palpitations  GASTROINTESTINAL: No abdominal or epigastric pain. No nausea, vomiting, or hematemesis; No diarrhea or constipation. No melena or hematochezia.  GENITOURINARY: No dysuria, frequency or hematuria  NEUROLOGICAL: No numbness or weakness  SKIN: Dry skin on feet  PSYCH:  No changes in mood REVIEW OF SYSTEMS:    CONSTITUTIONAL: No weakness, fevers or chills  EYES: No visual changes or eye discharge  ENT: No rhinorrhea or sore throat  NECK: No pain or stiffness  RESPIRATORY: No wheezing, hemoptysis; No shortness of breath  CARDIOVASCULAR: No chest pain or palpitations  GASTROINTESTINAL: No abdominal or epigastric pain. No nausea, vomiting, or hematemesis; No diarrhea or constipation. No melena or hematochezia.  GENITOURINARY: No dysuria, frequency or hematuria  NEUROLOGICAL: No numbness or weakness  SKIN: Dry skin on feet  PSYCH:  No changes in mood

## 2020-03-22 NOTE — H&P ADULT - PROBLEM SELECTOR PLAN 1
Pt with hypoxia, now improved on HC  - RVP pending  - COVID pending  - will c/w albuterol and ipratropium Pt with hypoxia, now improved on NC  - RVP pending  - COVID pending  - will c/w albuterol and ipratropium Pt with hypoxia, now improved on NC  - RVP pending  - COVID pending  - will c/w albuterol and ipratropium  - will check CRP, ESR, d-dimer, procalcitonin, LDH and ferritin Pt with hypoxia, now improved on NC, possibly in setting of fluid overload vs infection given CT findings  - RVP pending  - COVID pending  - will c/w albuterol and ipratropium  - will check CRP, ESR, d-dimer, procalcitonin, LDH and ferritin

## 2020-03-22 NOTE — H&P ADULT - NSHPPHYSICALEXAM_GEN_ALL_CORE
Vital Signs Last 24 Hrs  T(C): 37 (22 Mar 2020 01:34), Max: 37.4 (21 Mar 2020 18:12)  T(F): 98.6 (22 Mar 2020 01:34), Max: 99.3 (21 Mar 2020 18:12)  HR: 101 (22 Mar 2020 01:34) (101 - 124)  BP: 110/57 (22 Mar 2020 01:34) (110/57 - 128/79)  BP(mean): --  RR: 13 (22 Mar 2020 01:34) (13 - 20)  SpO2: 98% (22 Mar 2020 01:34) (98% - 100%) Vital Signs Last 24 Hrs  T(C): 37 (22 Mar 2020 01:34), Max: 37.4 (21 Mar 2020 18:12)  T(F): 98.6 (22 Mar 2020 01:34), Max: 99.3 (21 Mar 2020 18:12)  HR: 101 (22 Mar 2020 01:34) (101 - 124)  BP: 110/57 (22 Mar 2020 01:34) (110/57 - 128/79)  BP(mean): --  RR: 13 (22 Mar 2020 01:34) (13 - 20)  SpO2: 98% (22 Mar 2020 01:34) (98% - 100%)    Appearance: Sitting in bed, NAD  HEENT:   Normal oral mucosa, PERRL, EOMI, anicteric sclera  Cardiovascular: RRR, No murmurs, gallops or rubs appreciated  Respiratory: Lungs clear to auscultation bilaterally, no wheezes, crackles appreciated  Gastrointestinal:  Soft, nondistended, nontender, +BS	  Skin: dry skin noted bilaterally on legs to knees; cracked soles noted with lesion on left lateral aspect of good  Neurologic: AOx3, CNII-XII grossly intact, motor and sensory function grossly intact; no edena  Extremities: Moving all extremities equally  Vascular: palpable dp, pt, and radial pulses 2+ bilaterally  Psych:  Normal mood and affect, responds to questions appropriately Vital Signs Last 24 Hrs  T(C): 37 (22 Mar 2020 01:34), Max: 37.4 (21 Mar 2020 18:12)  T(F): 98.6 (22 Mar 2020 01:34), Max: 99.3 (21 Mar 2020 18:12)  HR: 101 (22 Mar 2020 01:34) (101 - 124)  BP: 110/57 (22 Mar 2020 01:34) (110/57 - 128/79)  BP(mean): --  RR: 13 (22 Mar 2020 01:34) (13 - 20)  SpO2: 98% (22 Mar 2020 01:34) (98% - 100%)    Appearance: Sitting in bed, NAD  HEENT:   Normal oral mucosa, PERRL, EOMI, anicteric sclera  Cardiovascular: RRR, No murmurs, gallops or rubs appreciated  Respiratory: Lungs clear to auscultation bilaterally, no wheezes, crackles appreciated  Gastrointestinal:  Soft, nondistended, nontender, +BS	  Skin: dry skin noted bilaterally on legs to knees; cracked soles noted with lesion on left lateral aspect of good  Neurologic: AOx3, CNII-XII grossly intact, motor and sensory function grossly intact  Extremities: Moving all extremities equally, 1+ pitting edema b/l  Vascular: palpable dp, pt, and radial pulses 2+ bilaterally  Psych:  Normal mood and affect, responds to questions appropriately Vital Signs Last 24 Hrs  T(C): 37 (22 Mar 2020 01:34), Max: 37.4 (21 Mar 2020 18:12)  T(F): 98.6 (22 Mar 2020 01:34), Max: 99.3 (21 Mar 2020 18:12)  HR: 101 (22 Mar 2020 01:34) (101 - 124)  BP: 110/57 (22 Mar 2020 01:34) (110/57 - 128/79)  BP(mean): --  RR: 13 (22 Mar 2020 01:34) (13 - 20)  SpO2: 98% (22 Mar 2020 01:34) (98% - 100%)    Appearance: Sitting in bed, NAD  HEENT:   Normal oral mucosa, PERRL, EOMI, anicteric sclera  Cardiovascular: RRR, No murmurs, gallops or rubs appreciated  Respiratory: Lungs clear to auscultation bilaterally, no wheezes, crackles appreciated  Gastrointestinal:  Soft, nondistended, nontender, +BS	  Skin: dry skin noted bilaterally on legs to knees; cracked soles noted with lesion on left lateral aspect of good  Neurologic: AOx3 but shows poor insight into his medical conditions, CNII-XII grossly intact, motor and sensory function grossly intact  Extremities: Moving all extremities equally, 1+ pitting edema b/l  Vascular: palpable dp, pt, and radial pulses 2+ bilaterally  Psych:  Normal mood and affect, responds to questions appropriately Vital Signs Last 24 Hrs  T(C): 37 (22 Mar 2020 01:34), Max: 37.4 (21 Mar 2020 18:12)  T(F): 98.6 (22 Mar 2020 01:34), Max: 99.3 (21 Mar 2020 18:12)  HR: 101 (22 Mar 2020 01:34) (101 - 124)  BP: 110/57 (22 Mar 2020 01:34) (110/57 - 128/79)  BP(mean): --  RR: 13 (22 Mar 2020 01:34) (13 - 20)  SpO2: 98% (22 Mar 2020 01:34) (98% - 100%)    Appearance: Sitting in bed, NAD  HEENT:   Normal oral mucosa, PERRL, EOMI, anicteric sclera  Cardiovascular: RRR, No murmurs, gallops or rubs appreciated  Respiratory: Lungs clear to auscultation bilaterally, no wheezes, crackles appreciated  Gastrointestinal:  Soft, nondistended, nontender, +BS	  Skin: dry skin noted bilaterally on legs to knees; cracked soles noted with lesion on left lateral aspect of ankle  Neurologic: AOx3 but shows poor insight into his medical conditions, CNII-XII grossly intact, motor and sensory function grossly intact  Extremities: Moving all extremities equally, 1+ pitting edema b/l  Vascular: palpable dp, pt, and radial pulses 2+ bilaterally  Psych:  Normal mood and affect, responds to questions appropriately

## 2020-03-22 NOTE — PROGRESS NOTE ADULT - PROBLEM SELECTOR PLAN 3
Patient with concern for possible heart failure given lower extremity swelling, pulmonary edema.  -s/p IV Lasix in ED, w/2L outpt   -f/u TTE  -Diurese PRN, currently will hold off as pt off O2 and saturating well

## 2020-03-22 NOTE — PROVIDER CONTACT NOTE (OTHER) - RECOMMENDATIONS
Patient initially refusing oxygen per nasal cannula, after multiple attempts patient agreed to wear nasal cannula. HOB raised to 30 degrees. Will continue to monitor patient.

## 2020-03-22 NOTE — H&P ADULT - PROBLEM SELECTOR PLAN 4
Pt with HbA1c 6.5 at last visit; not on home meds  - will start ISS in hospital Pt with schizo-affective disorer.    - Pt only had fluphenazine BID in bag  - consult psych in am for medication help

## 2020-03-22 NOTE — CHART NOTE - NSCHARTNOTEFT_GEN_A_CORE
Discussed case with psychiatry team.  Patient was seen in psych outpatient clinic 11 days ago.  At that time, patient refused prolixin injection.  His regimen for his schizoaffective disorder is as follows at home:    · 	risperiDONE 1 mg oral tablet every 12 hours  · 	benztropine 0.5 mg 2 times a day  · 	divalproex sodium 250 mg oral delayed release tablet: 3 tab(s) orally 2 times a day  · 	fluPHENAZine 5 mg oral tablet 2 times a day    Home regimen ordered.  Depakote level ordered for AM.  Patient will need close follow up in psychiatry clinic following discharge.  Attending MD Dr. Guerra made aware.

## 2020-03-23 DIAGNOSIS — J18.9 PNEUMONIA, UNSPECIFIED ORGANISM: ICD-10-CM

## 2020-03-23 LAB
ANION GAP SERPL CALC-SCNC: 8 MMO/L — SIGNIFICANT CHANGE UP (ref 7–14)
BASOPHILS # BLD AUTO: 0.03 K/UL — SIGNIFICANT CHANGE UP (ref 0–0.2)
BASOPHILS NFR BLD AUTO: 0.4 % — SIGNIFICANT CHANGE UP (ref 0–2)
BUN SERPL-MCNC: 17 MG/DL — SIGNIFICANT CHANGE UP (ref 7–23)
CALCIUM SERPL-MCNC: 8.7 MG/DL — SIGNIFICANT CHANGE UP (ref 8.4–10.5)
CHLORIDE SERPL-SCNC: 99 MMOL/L — SIGNIFICANT CHANGE UP (ref 98–107)
CO2 SERPL-SCNC: 29 MMOL/L — SIGNIFICANT CHANGE UP (ref 22–31)
CREAT SERPL-MCNC: 0.86 MG/DL — SIGNIFICANT CHANGE UP (ref 0.5–1.3)
CULTURE RESULTS: SIGNIFICANT CHANGE UP
EOSINOPHIL # BLD AUTO: 0.56 K/UL — HIGH (ref 0–0.5)
EOSINOPHIL NFR BLD AUTO: 8 % — HIGH (ref 0–6)
GLUCOSE BLDC GLUCOMTR-MCNC: 93 MG/DL — SIGNIFICANT CHANGE UP (ref 70–99)
GLUCOSE SERPL-MCNC: 104 MG/DL — HIGH (ref 70–99)
HCT VFR BLD CALC: 35.9 % — LOW (ref 39–50)
HCV AB S/CO SERPL IA: 0.19 S/CO — SIGNIFICANT CHANGE UP (ref 0–0.99)
HCV AB SERPL-IMP: SIGNIFICANT CHANGE UP
HGB BLD-MCNC: 11.5 G/DL — LOW (ref 13–17)
IMM GRANULOCYTES NFR BLD AUTO: 0.3 % — SIGNIFICANT CHANGE UP (ref 0–1.5)
LYMPHOCYTES # BLD AUTO: 1.44 K/UL — SIGNIFICANT CHANGE UP (ref 1–3.3)
LYMPHOCYTES # BLD AUTO: 20.5 % — SIGNIFICANT CHANGE UP (ref 13–44)
MAGNESIUM SERPL-MCNC: 2 MG/DL — SIGNIFICANT CHANGE UP (ref 1.6–2.6)
MCHC RBC-ENTMCNC: 29.9 PG — SIGNIFICANT CHANGE UP (ref 27–34)
MCHC RBC-ENTMCNC: 32 % — SIGNIFICANT CHANGE UP (ref 32–36)
MCV RBC AUTO: 93.2 FL — SIGNIFICANT CHANGE UP (ref 80–100)
MONOCYTES # BLD AUTO: 0.93 K/UL — HIGH (ref 0–0.9)
MONOCYTES NFR BLD AUTO: 13.3 % — SIGNIFICANT CHANGE UP (ref 2–14)
NEUTROPHILS # BLD AUTO: 4.03 K/UL — SIGNIFICANT CHANGE UP (ref 1.8–7.4)
NEUTROPHILS NFR BLD AUTO: 57.5 % — SIGNIFICANT CHANGE UP (ref 43–77)
NRBC # FLD: 0 K/UL — SIGNIFICANT CHANGE UP (ref 0–0)
NT-PROBNP SERPL-SCNC: 958.4 PG/ML — SIGNIFICANT CHANGE UP
PLATELET # BLD AUTO: 507 K/UL — HIGH (ref 150–400)
PMV BLD: 8.5 FL — SIGNIFICANT CHANGE UP (ref 7–13)
POTASSIUM SERPL-MCNC: 4.2 MMOL/L — SIGNIFICANT CHANGE UP (ref 3.5–5.3)
POTASSIUM SERPL-SCNC: 4.2 MMOL/L — SIGNIFICANT CHANGE UP (ref 3.5–5.3)
RBC # BLD: 3.85 M/UL — LOW (ref 4.2–5.8)
RBC # FLD: 13.5 % — SIGNIFICANT CHANGE UP (ref 10.3–14.5)
SODIUM SERPL-SCNC: 136 MMOL/L — SIGNIFICANT CHANGE UP (ref 135–145)
SPECIMEN SOURCE: SIGNIFICANT CHANGE UP
TROPONIN T, HIGH SENSITIVITY: 26 NG/L — SIGNIFICANT CHANGE UP (ref ?–14)
VALPROATE SERPL-MCNC: 67 UG/ML — SIGNIFICANT CHANGE UP (ref 50–100)
WBC # BLD: 7.01 K/UL — SIGNIFICANT CHANGE UP (ref 3.8–10.5)
WBC # FLD AUTO: 7.01 K/UL — SIGNIFICANT CHANGE UP (ref 3.8–10.5)

## 2020-03-23 PROCEDURE — 99233 SBSQ HOSP IP/OBS HIGH 50: CPT

## 2020-03-23 RX ORDER — FUROSEMIDE 40 MG
40 TABLET ORAL
Refills: 0 | Status: DISCONTINUED | OUTPATIENT
Start: 2020-03-23 | End: 2020-03-24

## 2020-03-23 RX ORDER — AZITHROMYCIN 500 MG/1
TABLET, FILM COATED ORAL
Refills: 0 | Status: DISCONTINUED | OUTPATIENT
Start: 2020-03-23 | End: 2020-03-23

## 2020-03-23 RX ORDER — CEFTRIAXONE 500 MG/1
1000 INJECTION, POWDER, FOR SOLUTION INTRAMUSCULAR; INTRAVENOUS EVERY 24 HOURS
Refills: 0 | Status: DISCONTINUED | OUTPATIENT
Start: 2020-03-23 | End: 2020-03-26

## 2020-03-23 RX ORDER — AZITHROMYCIN 500 MG/1
500 TABLET, FILM COATED ORAL DAILY
Refills: 0 | Status: DISCONTINUED | OUTPATIENT
Start: 2020-03-23 | End: 2020-03-26

## 2020-03-23 RX ADMIN — CEFTRIAXONE 100 MILLIGRAM(S): 500 INJECTION, POWDER, FOR SOLUTION INTRAMUSCULAR; INTRAVENOUS at 17:47

## 2020-03-23 RX ADMIN — TIOTROPIUM BROMIDE 1 CAPSULE(S): 18 CAPSULE ORAL; RESPIRATORY (INHALATION) at 13:04

## 2020-03-23 RX ADMIN — Medication 40 MILLIGRAM(S): at 17:46

## 2020-03-23 RX ADMIN — RISPERIDONE 1 MILLIGRAM(S): 4 TABLET ORAL at 06:21

## 2020-03-23 RX ADMIN — AZITHROMYCIN 500 MILLIGRAM(S): 500 TABLET, FILM COATED ORAL at 17:43

## 2020-03-23 RX ADMIN — HEPARIN SODIUM 5000 UNIT(S): 5000 INJECTION INTRAVENOUS; SUBCUTANEOUS at 06:22

## 2020-03-23 RX ADMIN — DIVALPROEX SODIUM 750 MILLIGRAM(S): 500 TABLET, DELAYED RELEASE ORAL at 06:21

## 2020-03-23 RX ADMIN — DIVALPROEX SODIUM 750 MILLIGRAM(S): 500 TABLET, DELAYED RELEASE ORAL at 17:44

## 2020-03-23 RX ADMIN — FLUPHENAZINE HYDROCHLORIDE 5 MILLIGRAM(S): 1 TABLET, FILM COATED ORAL at 17:45

## 2020-03-23 RX ADMIN — HEPARIN SODIUM 5000 UNIT(S): 5000 INJECTION INTRAVENOUS; SUBCUTANEOUS at 17:46

## 2020-03-23 RX ADMIN — Medication 0.5 MILLIGRAM(S): at 17:44

## 2020-03-23 RX ADMIN — Medication 0.5 MILLIGRAM(S): at 06:21

## 2020-03-23 RX ADMIN — RISPERIDONE 1 MILLIGRAM(S): 4 TABLET ORAL at 17:46

## 2020-03-23 RX ADMIN — Medication 40 MILLIGRAM(S): at 11:28

## 2020-03-23 RX ADMIN — FLUPHENAZINE HYDROCHLORIDE 5 MILLIGRAM(S): 1 TABLET, FILM COATED ORAL at 06:21

## 2020-03-23 NOTE — PROGRESS NOTE ADULT - PROBLEM SELECTOR PLAN 4
Patient with concern for possible heart failure given lower extremity swelling, pulmonary edema.  - f/u echo to evaluate heart further  - strict i/o  - Lasix IV BID for now givne pulm edema Patient with concern for possible heart failure given lower extremity swelling, pulmonary edema.  - f/u echo to evaluate heart further  - strict i/o  - Lasix IV BID for now given pulm edema

## 2020-03-23 NOTE — PROGRESS NOTE ADULT - SUBJECTIVE AND OBJECTIVE BOX
[facility] Division of Hospital Medicine  Saroj Huitron MD  Pager [phone]      Patient is a 59y old  Male who presents with a chief complaint of r/o COVID/hypoxia (22 Mar 2020 20:17)      SUBJECTIVE / OVERNIGHT EVENTS: Poor historian due to schizophrenia. Endorses improved SOB    MEDICATIONS  (STANDING):  benztropine 0.5 milliGRAM(s) Oral two times a day  dextrose 5%. 1000 milliLiter(s) (50 mL/Hr) IV Continuous <Continuous>  dextrose 50% Injectable 12.5 Gram(s) IV Push once  dextrose 50% Injectable 25 Gram(s) IV Push once  dextrose 50% Injectable 25 Gram(s) IV Push once  diVALproex  milliGRAM(s) Oral two times a day  fluPHENAZine 5 milliGRAM(s) Oral two times a day  furosemide   Injectable 40 milliGRAM(s) IV Push two times a day  heparin  Injectable 5000 Unit(s) SubCutaneous every 12 hours  insulin lispro (HumaLOG) corrective regimen sliding scale   SubCutaneous three times a day before meals  insulin lispro (HumaLOG) corrective regimen sliding scale   SubCutaneous at bedtime  risperiDONE   Tablet 1 milliGRAM(s) Oral two times a day  tiotropium 18 MICROgram(s) Capsule 1 Capsule(s) Inhalation daily    MEDICATIONS  (PRN):  acetaminophen   Tablet .. 650 milliGRAM(s) Oral every 6 hours PRN Temp greater or equal to 38C (100.4F), Mild Pain (1 - 3), Moderate Pain (4 - 6), Severe Pain (7 - 10)  ALBUTerol    90 MICROgram(s) HFA Inhaler 2 Puff(s) Inhalation every 6 hours PRN Shortness of Breath and/or Wheezing  dextrose 40% Gel 15 Gram(s) Oral once PRN Blood Glucose LESS THAN 70 milliGRAM(s)/deciliter  glucagon  Injectable 1 milliGRAM(s) IntraMuscular once PRN Glucose LESS THAN 70 milligrams/deciliter      CAPILLARY BLOOD GLUCOSE      POCT Blood Glucose.: 109 mg/dL (23 Mar 2020 12:11)  POCT Blood Glucose.: 93 mg/dL (23 Mar 2020 07:50)  POCT Blood Glucose.: 128 mg/dL (22 Mar 2020 22:30)  POCT Blood Glucose.: 95 mg/dL (22 Mar 2020 17:52)    I&O's Summary    22 Mar 2020 07:01  -  23 Mar 2020 07:00  --------------------------------------------------------  IN: 0 mL / OUT: 1000 mL / NET: -1000 mL    23 Mar 2020 07:01  -  23 Mar 2020 14:28  --------------------------------------------------------  IN: 0 mL / OUT: 650 mL / NET: -650 mL        PHYSICAL EXAM:  Vital Signs Last 24 Hrs  T(C): 37 (23 Mar 2020 12:59), Max: 37.3 (22 Mar 2020 15:56)  T(F): 98.6 (23 Mar 2020 12:59), Max: 99.1 (22 Mar 2020 15:56)  HR: 118 (23 Mar 2020 12:59) (95 - 120)  BP: 100/68 (23 Mar 2020 12:59) (100/68 - 133/71)  BP(mean): --  RR: 18 (23 Mar 2020 12:59) (17 - 20)  SpO2: 98% (23 Mar 2020 12:59) (85% - 100%)    CONSTITUTIONAL: NAD  EYES: conjunctiva and sclera clear  ENMT: mmm  NECK: Supple,  RESPIRATORY: Normal respiratory effort; lungs are clear to auscultation bilaterally  CARDIOVASCULAR: Regular rate and rhythm, + S1 and S2  ABDOMEN: Nontender to palpation, normoactive bowel sounds, no rebound/guarding  MUSCULOSKELETAL:  no clubbing or cyanosis of digits;   PSYCH: A+O x 2-3, flight of ideas.  SKIN: LE Interdigit ulceration       LABS:                        11.5   7.01  )-----------( 507      ( 23 Mar 2020 06:30 )             35.9     03-23    136  |  99  |  17  ----------------------------<  104<H>  4.2   |  29  |  0.86    Ca    8.7      23 Mar 2020 06:30  Phos  3.5     03-22  Mg     2.0     -    TPro  6.9  /  Alb  3.1<L>  /  TBili  < 0.2<L>  /  DBili  x   /  AST  17  /  ALT  11  /  AlkPhos  72  -          Urinalysis Basic - ( 21 Mar 2020 20:58 )    Color: LIGHT YELLOW / Appearance: CLEAR / S.017 / pH: 6.5  Gluc: NEGATIVE / Ketone: NEGATIVE  / Bili: NEGATIVE / Urobili: NORMAL   Blood: NEGATIVE / Protein: NEGATIVE / Nitrite: NEGATIVE   Leuk Esterase: NEGATIVE / RBC: x / WBC x   Sq Epi: x / Non Sq Epi: x / Bacteria: x        Culture - Urine (collected 21 Mar 2020 23:07)  Source: .Urine Clean Catch (Midstream)  Final Report (23 Mar 2020 07:18):    <10,000 CFU/mL Normal Urogenital Nicole            COORDINATION OF CARE:  Care Discussed with Consultants/Other Providers [Y/N]:  Prior or Outpatient Records Reviewed [Y/N]:

## 2020-03-23 NOTE — PROGRESS NOTE ADULT - PROBLEM SELECTOR PLAN 1
- improving hypoxia with diuresis,  possibly in setting of fluid overload and/or infection given CT findings  - RVP and COVID negative  - will c/w albuterol and ipratropium

## 2020-03-23 NOTE — PROGRESS NOTE ADULT - PROBLEM SELECTOR PLAN 2
given multinodular opacities on CT with hypoxia and neg RVP/COVID, will cover with Abx  Will order ceftriaxone and azithromycin

## 2020-03-23 NOTE — PROGRESS NOTE ADULT - PROBLEM SELECTOR PLAN 3
Pt with chronic wounds bilaterally, unlikely to be infected  -Podiatry consult, requesting Xray before seeing pt. Bala order Xray Pt with chronic wounds bilaterally, unlikely to be infected  -Podiatry consult, requesting Xray before seeing pt. Will order Xray

## 2020-03-24 LAB
ALBUMIN SERPL ELPH-MCNC: 2.8 G/DL — LOW (ref 3.3–5)
ALP SERPL-CCNC: 69 U/L — SIGNIFICANT CHANGE UP (ref 40–120)
ALT FLD-CCNC: 12 U/L — SIGNIFICANT CHANGE UP (ref 4–41)
ANION GAP SERPL CALC-SCNC: 9 MMO/L — SIGNIFICANT CHANGE UP (ref 7–14)
AST SERPL-CCNC: 18 U/L — SIGNIFICANT CHANGE UP (ref 4–40)
BASOPHILS # BLD AUTO: 0.02 K/UL — SIGNIFICANT CHANGE UP (ref 0–0.2)
BASOPHILS NFR BLD AUTO: 0.4 % — SIGNIFICANT CHANGE UP (ref 0–2)
BILIRUB SERPL-MCNC: 0.3 MG/DL — SIGNIFICANT CHANGE UP (ref 0.2–1.2)
BUN SERPL-MCNC: 18 MG/DL — SIGNIFICANT CHANGE UP (ref 7–23)
CALCIUM SERPL-MCNC: 8.7 MG/DL — SIGNIFICANT CHANGE UP (ref 8.4–10.5)
CHLORIDE SERPL-SCNC: 98 MMOL/L — SIGNIFICANT CHANGE UP (ref 98–107)
CO2 SERPL-SCNC: 31 MMOL/L — SIGNIFICANT CHANGE UP (ref 22–31)
CREAT SERPL-MCNC: 0.94 MG/DL — SIGNIFICANT CHANGE UP (ref 0.5–1.3)
EOSINOPHIL # BLD AUTO: 0.41 K/UL — SIGNIFICANT CHANGE UP (ref 0–0.5)
EOSINOPHIL NFR BLD AUTO: 7.2 % — HIGH (ref 0–6)
GLUCOSE SERPL-MCNC: 101 MG/DL — HIGH (ref 70–99)
HCT VFR BLD CALC: 40.7 % — SIGNIFICANT CHANGE UP (ref 39–50)
HGB BLD-MCNC: 12.9 G/DL — LOW (ref 13–17)
IMM GRANULOCYTES NFR BLD AUTO: 0.4 % — SIGNIFICANT CHANGE UP (ref 0–1.5)
LYMPHOCYTES # BLD AUTO: 1.29 K/UL — SIGNIFICANT CHANGE UP (ref 1–3.3)
LYMPHOCYTES # BLD AUTO: 22.6 % — SIGNIFICANT CHANGE UP (ref 13–44)
MAGNESIUM SERPL-MCNC: 2.1 MG/DL — SIGNIFICANT CHANGE UP (ref 1.6–2.6)
MCHC RBC-ENTMCNC: 29.5 PG — SIGNIFICANT CHANGE UP (ref 27–34)
MCHC RBC-ENTMCNC: 31.7 % — LOW (ref 32–36)
MCV RBC AUTO: 92.9 FL — SIGNIFICANT CHANGE UP (ref 80–100)
MONOCYTES # BLD AUTO: 0.78 K/UL — SIGNIFICANT CHANGE UP (ref 0–0.9)
MONOCYTES NFR BLD AUTO: 13.7 % — SIGNIFICANT CHANGE UP (ref 2–14)
NEUTROPHILS # BLD AUTO: 3.19 K/UL — SIGNIFICANT CHANGE UP (ref 1.8–7.4)
NEUTROPHILS NFR BLD AUTO: 55.7 % — SIGNIFICANT CHANGE UP (ref 43–77)
NRBC # FLD: 0 K/UL — SIGNIFICANT CHANGE UP (ref 0–0)
PHOSPHATE SERPL-MCNC: 3.9 MG/DL — SIGNIFICANT CHANGE UP (ref 2.5–4.5)
PLATELET # BLD AUTO: 550 K/UL — HIGH (ref 150–400)
PMV BLD: 8.6 FL — SIGNIFICANT CHANGE UP (ref 7–13)
POTASSIUM SERPL-MCNC: 4.3 MMOL/L — SIGNIFICANT CHANGE UP (ref 3.5–5.3)
POTASSIUM SERPL-SCNC: 4.3 MMOL/L — SIGNIFICANT CHANGE UP (ref 3.5–5.3)
PROT SERPL-MCNC: 6.9 G/DL — SIGNIFICANT CHANGE UP (ref 6–8.3)
RBC # BLD: 4.38 M/UL — SIGNIFICANT CHANGE UP (ref 4.2–5.8)
RBC # FLD: 13.5 % — SIGNIFICANT CHANGE UP (ref 10.3–14.5)
SODIUM SERPL-SCNC: 138 MMOL/L — SIGNIFICANT CHANGE UP (ref 135–145)
WBC # BLD: 5.71 K/UL — SIGNIFICANT CHANGE UP (ref 3.8–10.5)
WBC # FLD AUTO: 5.71 K/UL — SIGNIFICANT CHANGE UP (ref 3.8–10.5)

## 2020-03-24 PROCEDURE — 99233 SBSQ HOSP IP/OBS HIGH 50: CPT

## 2020-03-24 RX ADMIN — FLUPHENAZINE HYDROCHLORIDE 5 MILLIGRAM(S): 1 TABLET, FILM COATED ORAL at 17:48

## 2020-03-24 RX ADMIN — CEFTRIAXONE 100 MILLIGRAM(S): 500 INJECTION, POWDER, FOR SOLUTION INTRAMUSCULAR; INTRAVENOUS at 17:48

## 2020-03-24 RX ADMIN — RISPERIDONE 1 MILLIGRAM(S): 4 TABLET ORAL at 06:41

## 2020-03-24 RX ADMIN — FLUPHENAZINE HYDROCHLORIDE 5 MILLIGRAM(S): 1 TABLET, FILM COATED ORAL at 06:42

## 2020-03-24 RX ADMIN — Medication 0.5 MILLIGRAM(S): at 06:37

## 2020-03-24 RX ADMIN — AZITHROMYCIN 500 MILLIGRAM(S): 500 TABLET, FILM COATED ORAL at 12:19

## 2020-03-24 RX ADMIN — DIVALPROEX SODIUM 750 MILLIGRAM(S): 500 TABLET, DELAYED RELEASE ORAL at 06:41

## 2020-03-24 RX ADMIN — RISPERIDONE 1 MILLIGRAM(S): 4 TABLET ORAL at 17:48

## 2020-03-24 RX ADMIN — TIOTROPIUM BROMIDE 1 CAPSULE(S): 18 CAPSULE ORAL; RESPIRATORY (INHALATION) at 12:20

## 2020-03-24 RX ADMIN — DIVALPROEX SODIUM 750 MILLIGRAM(S): 500 TABLET, DELAYED RELEASE ORAL at 17:48

## 2020-03-24 RX ADMIN — Medication 0.5 MILLIGRAM(S): at 17:48

## 2020-03-24 NOTE — PROGRESS NOTE ADULT - SUBJECTIVE AND OBJECTIVE BOX
[facility] Division of Hospital Medicine  Saroj Huitron MD  Pager [phone]      Patient is a 59y old  Male who presents with a chief complaint of r/o COVID/hypoxia (24 Mar 2020 10:52)      SUBJECTIVE / OVERNIGHT EVENTS: Improved SOB. No fever or chills    MEDICATIONS  (STANDING):  azithromycin   Tablet 500 milliGRAM(s) Oral daily  benztropine 0.5 milliGRAM(s) Oral two times a day  cefTRIAXone   IVPB 1000 milliGRAM(s) IV Intermittent every 24 hours  dextrose 5%. 1000 milliLiter(s) (50 mL/Hr) IV Continuous <Continuous>  dextrose 50% Injectable 12.5 Gram(s) IV Push once  dextrose 50% Injectable 25 Gram(s) IV Push once  dextrose 50% Injectable 25 Gram(s) IV Push once  diVALproex  milliGRAM(s) Oral two times a day  fluPHENAZine 5 milliGRAM(s) Oral two times a day  heparin  Injectable 5000 Unit(s) SubCutaneous every 12 hours  insulin lispro (HumaLOG) corrective regimen sliding scale   SubCutaneous three times a day before meals  insulin lispro (HumaLOG) corrective regimen sliding scale   SubCutaneous at bedtime  risperiDONE   Tablet 1 milliGRAM(s) Oral two times a day  tiotropium 18 MICROgram(s) Capsule 1 Capsule(s) Inhalation daily    MEDICATIONS  (PRN):  acetaminophen   Tablet .. 650 milliGRAM(s) Oral every 6 hours PRN Temp greater or equal to 38C (100.4F), Mild Pain (1 - 3), Moderate Pain (4 - 6), Severe Pain (7 - 10)  ALBUTerol    90 MICROgram(s) HFA Inhaler 2 Puff(s) Inhalation every 6 hours PRN Shortness of Breath and/or Wheezing  dextrose 40% Gel 15 Gram(s) Oral once PRN Blood Glucose LESS THAN 70 milliGRAM(s)/deciliter  glucagon  Injectable 1 milliGRAM(s) IntraMuscular once PRN Glucose LESS THAN 70 milligrams/deciliter      CAPILLARY BLOOD GLUCOSE      POCT Blood Glucose.: 99 mg/dL (24 Mar 2020 11:36)  POCT Blood Glucose.: 94 mg/dL (24 Mar 2020 07:35)  POCT Blood Glucose.: 84 mg/dL (23 Mar 2020 22:36)  POCT Blood Glucose.: 102 mg/dL (23 Mar 2020 17:09)    I&O's Summary    23 Mar 2020 07:01  -  24 Mar 2020 07:00  --------------------------------------------------------  IN: 0 mL / OUT: 1575 mL / NET: -1575 mL    24 Mar 2020 07:01  -  24 Mar 2020 14:07  --------------------------------------------------------  IN: 234 mL / OUT: 200 mL / NET: 34 mL        PHYSICAL EXAM:  Vital Signs Last 24 Hrs  T(C): 36.5 (24 Mar 2020 13:46), Max: 37.4 (23 Mar 2020 17:41)  T(F): 97.7 (24 Mar 2020 13:46), Max: 99.4 (23 Mar 2020 21:55)  HR: 100 (24 Mar 2020 13:46) (100 - 130)  BP: 98/60 (24 Mar 2020 13:46) (96/63 - 120/70)  BP(mean): --  RR: 16 (24 Mar 2020 13:46) (16 - 22)  SpO2: 96% (24 Mar 2020 13:46) (94% - 100%)    CONSTITUTIONAL: NAD  EYES: conjunctiva and sclera clear  ENMT: mmm  NECK: Supple,  RESPIRATORY: Normal respiratory effort; lungs are clear to auscultation bilaterally  CARDIOVASCULAR: +tachycardia, + S1 and S2  ABDOMEN: Nontender to palpation, normoactive bowel sounds, no rebound/guarding  MUSCULOSKELETAL:  no clubbing or cyanosis of digits;   PSYCH: A+O x 3  SKIN: chronic venostasis changes    LABS:                        12.9   5.71  )-----------( 550      ( 24 Mar 2020 07:07 )             40.7     03-24    138  |  98  |  18  ----------------------------<  101<H>  4.3   |  31  |  0.94    Ca    8.7      24 Mar 2020 07:07  Phos  3.9     03-24  Mg     2.1     03-24    TPro  6.9  /  Alb  2.8<L>  /  TBili  0.3  /  DBili  x   /  AST  18  /  ALT  12  /  AlkPhos  69  03-24              Culture - Urine (collected 21 Mar 2020 23:07)  Source: .Urine Clean Catch (Midstream)  Final Report (23 Mar 2020 07:18):    <10,000 CFU/mL Normal Urogenital Nicole

## 2020-03-24 NOTE — ADVANCED PRACTICE NURSE CONSULT - ASSESSMENT
A&Ox4,generalized poor hygiene noted,  patient receiving supplemental oxygen via nasal cannula, Right side of neck with area of hypopigmentation surrounded by hyperkeratosis (Patient reports he suffers from dry skin", currently bedbound, continent of urine and stool.    Bilateral lower extremities with scattered areas of hyper and hypopigmentation. Lichenification. Severe dry flaky skin. Thickened-yellow hyperpigmented overgrown toenails. Right great toe nail partially with avulsion. No temperature changes noted. No Edema. Capillary refill <3 seconds. DP/PT pulses 2+.     Left lateral malleolus wound of unknown etiology trauma wound vs. venous ulcer (hx of leg edema)- 2.8qus4cpc3.7cm. Upon initial presentation presenting as unstable scab with + purulent drainage. Upon cleansing able to remove scab and exposing red moist granulation tissue. Moist base. No odor. Periwound skin with chronic hypopigmentation. No increased warmth, no erythema, no induration. Goals of care: monitor for tissue type changes. reduce bacterial load, maintain a moist environment for wound healing. A&Ox4,generalized poor hygiene noted,  patient receiving supplemental oxygen via nasal cannula, Right side of neck with area of hypopigmentation surrounded by hyperkeratosis (Patient reports he suffers from dry skin", currently bedbound, continent of urine and stool.    Bilateral lower extremities with scattered areas of hyper and hypopigmentation. Lichenification. Severe dry flaky skin. Thickened-yellow hyperpigmented overgrown toenails. Right great toe nail partially with avulsion. No temperature changes noted. No Edema. Capillary refill <3 seconds. DP/PT pulses 2+.     Left lateral malleolus wound of unknown etiology trauma wound vs. venous ulcer (hx of leg edema)- 2.4lgv9fkn6.7cm. Upon initial presentation presenting as unstable scab with + purulent drainage. Upon cleansing able to remove scab and exposing red moist granulation tissue. Moist base. No odor. Periwound skin with chronic hypopigmentation. No increased warmth, no erythema, no induration. Goals of care: monitor for tissue type changes. reduce bacterial load, maintain a moist environment for wound healing.       Primary team notified about completed consult

## 2020-03-24 NOTE — PROGRESS NOTE ADULT - ASSESSMENT
Assessment/Plan:    Partially avulsed right hallux nail    -aseptic removal of right hallux nail plate with hemostat with peroxide flush/cleanse with bacitracin applied  --no need for further wound care at this time  --recommend continued routine podiatric foot care as outpatient  --podiatry to f/u prn

## 2020-03-24 NOTE — ADVANCED PRACTICE NURSE CONSULT - RECOMMEDATIONS
Upon discharge, Recommend follow up care at [facility]: [phone]. Address: [address] [place] [address] for left malleolus wound.   Monitor right great toe nail bed for tissue type changes.     Topical Recommendations     Offload posterior ears with offloading trach toe (Posey)    Apply Sween 24 to right side of neck and bilateral feet and legs. Avoid open areas. Apply daily.     Left lateral malleolus: Clean with NS. Pat dry. Apply Liquid barrier film to periwound skin. Apply Medihoney gel to wound. Cover with Sterile gauze and secure with Kerlix. Change daily.     Please contact Wound Care Service Line if we can be of further assistance (ext [phone]).

## 2020-03-24 NOTE — ADVANCED PRACTICE NURSE CONSULT - REASON FOR CONSULT
Patient seen on skin care rounds after wound care referral received for assessment of skin impairment and recommendations of topical management. Chart reviewed: Reilly 20, BMI 21.9kg/m2, WBC 5.71, Hemoglobin A1C 6.5. patient interviewed, patient AOX4 however unable to provide HPI. Patient H/O of schizoaffective disorder presenting with swelling in legs x 5 months.  Patient states that he has been having worsening swelling in his legs since his sister .  Patient states he went to Urgent Care today for evaluation of his legs.  Because he was having a cough, he was sent to ED for further care.  Denies fevers, chills at this time.  Patient states he has a long standing cough, but his legs are what are bothering him the most. Patient is being r/o for COVID 19. Patient has being seen by Podiatry attending for tight toe nail partial avulsion.

## 2020-03-24 NOTE — PROGRESS NOTE ADULT - PROBLEM SELECTOR PLAN 4
Patient with concern for possible heart failure given lower extremity swelling, pulmonary edema.  - f/u echo to evaluate heart further  - strict i/o  -Hold Lasix given hypotension

## 2020-03-24 NOTE — PROGRESS NOTE ADULT - PROBLEM SELECTOR PLAN 2
given multinodular opacities on CT with hypoxia and neg RVP/COVID, will cover with Abx  c/w ceftriaxone and azithromycin  will check BCx given hypotension, tachycardia  Hold diuresis

## 2020-03-24 NOTE — CHART NOTE - NSCHARTNOTEFT_GEN_A_CORE
Psychiatry C/L Attending Chart Note:    Called about this 59M PMH homeless M with h/o schizoaffective disorder presenting with hypoxia and increased leg swelling.  Less likely to be CHF, however diuresis has helped.  Given hypoxia, concern for COVID. Thus far, COVID negative x1, however remains on droplet/contact precautions.  Given possible COVID, verbal recommendations provided.     Primary team already had noted that pt's current psychotropic med regimen, as of 11 days ago from outpatient psych clinic, was the following:    Risperidone 1mg bid  Cogentin 0.5mg bid  VPA 750mg bid  Prolixin 5mg po bid    All of these above medications have been restarted in the hospital, and pt has been compliant. Pt has been overall calm thus far and has not required any prn medications. Primary team describes that pt has disorganized thought process and is a poor historian in general.       VPA level on 3/23 = 67      Recommendations:  - continue with all standing psychotropic medications as above   - for agitation, can use Haldol 5mg po/iv/im q6h prn  - if Haldol not effective, can also use Ativan 2mg po/iv/im q6h prn agitation  - on discharge, please give Rxs for pt's psychotropic medications  - pt can f/u with his PCP, his outpatient psychiatrist (if he has one), or f/u at [facility] [phone]    Above recs discussed with attending Dr. Barrios pager [phone].   Please call me with any questions, spectra [phone]    Marleni Cooper MD

## 2020-03-24 NOTE — PROGRESS NOTE ADULT - SUBJECTIVE AND OBJECTIVE BOX
Podiatry pager #: [phone]/ [phone]    Patient is a 59y old  Male who presents with a chief complaint of r/o COVID/hypoxia (23 Mar 2020 14:28) Podiatry seen today for complaint of bleeding right big toenail that is falling off      HPI:  59M PMH schizoaffective disorder presenting with swelling in legs x 5 months.  Patient states that he has been having worsening swelling in his legs since his sister .  Patient states he went to Urgent Care today for evaluation of his legs.  Because he was having a cough, he was sent to ED for further care.  Denies fevers, chills at this time.  Patient states he has a long standing cough, but his legs are what are bothering him the most.      In the ED, patient was tachycardic to 112.  Patient was given lasix 40 mg in the ED with 2L output per nurse.  Patient was desatting to 85% while in ED and was placed on NC.     At time of exam, patient denied fevers, chills, nausea, vomiting.  Complained that he could not walk and legs hurt. (22 Mar 2020 04:33)      PAST MEDICAL & SURGICAL HISTORY:  Schizoaffective disorder  No significant past surgical history      MEDICATIONS  (STANDING):  azithromycin   Tablet 500 milliGRAM(s) Oral daily  benztropine 0.5 milliGRAM(s) Oral two times a day  cefTRIAXone   IVPB 1000 milliGRAM(s) IV Intermittent every 24 hours  dextrose 5%. 1000 milliLiter(s) (50 mL/Hr) IV Continuous <Continuous>  dextrose 50% Injectable 12.5 Gram(s) IV Push once  dextrose 50% Injectable 25 Gram(s) IV Push once  dextrose 50% Injectable 25 Gram(s) IV Push once  diVALproex  milliGRAM(s) Oral two times a day  fluPHENAZine 5 milliGRAM(s) Oral two times a day  furosemide   Injectable 40 milliGRAM(s) IV Push two times a day  heparin  Injectable 5000 Unit(s) SubCutaneous every 12 hours  insulin lispro (HumaLOG) corrective regimen sliding scale   SubCutaneous three times a day before meals  insulin lispro (HumaLOG) corrective regimen sliding scale   SubCutaneous at bedtime  risperiDONE   Tablet 1 milliGRAM(s) Oral two times a day  tiotropium 18 MICROgram(s) Capsule 1 Capsule(s) Inhalation daily    MEDICATIONS  (PRN):  acetaminophen   Tablet .. 650 milliGRAM(s) Oral every 6 hours PRN Temp greater or equal to 38C (100.4F), Mild Pain (1 - 3), Moderate Pain (4 - 6), Severe Pain (7 - 10)  ALBUTerol    90 MICROgram(s) HFA Inhaler 2 Puff(s) Inhalation every 6 hours PRN Shortness of Breath and/or Wheezing  dextrose 40% Gel 15 Gram(s) Oral once PRN Blood Glucose LESS THAN 70 milliGRAM(s)/deciliter  glucagon  Injectable 1 milliGRAM(s) IntraMuscular once PRN Glucose LESS THAN 70 milligrams/deciliter      Allergies    No Known Allergies    Intolerances        VITALS:    Vital Signs Last 24 Hrs  T(C): 36.7 (24 Mar 2020 10:10), Max: 37.4 (23 Mar 2020 17:41)  T(F): 98 (24 Mar 2020 10:10), Max: 99.4 (23 Mar 2020 21:55)  HR: 103 (24 Mar 2020 10:10) (103 - 130)  BP: 99/60 (24 Mar 2020 10:10) (96/63 - 120/70)  BP(mean): --  RR: 18 (24 Mar 2020 10:10) (18 - 22)  SpO2: 98% (24 Mar 2020 10:10) (94% - 100%)    LABS:                          12.9   5.71  )-----------( 550      ( 24 Mar 2020 07:07 )             40.7       03-24    138  |  98  |  18  ----------------------------<  101<H>  4.3   |  31  |  0.94    Ca    8.7      24 Mar 2020 07:07  Phos  3.9     03-24  Mg     2.1     03-24    TPro  6.9  /  Alb  2.8<L>  /  TBili  0.3  /  DBili  x   /  AST  18  /  ALT  12  /  AlkPhos  69  03-24      CAPILLARY BLOOD GLUCOSE      POCT Blood Glucose.: 94 mg/dL (24 Mar 2020 07:35)  POCT Blood Glucose.: 84 mg/dL (23 Mar 2020 22:36)  POCT Blood Glucose.: 102 mg/dL (23 Mar 2020 17:09)  POCT Blood Glucose.: 109 mg/dL (23 Mar 2020 12:11)          LOWER EXTREMITY PHYSICAL EXAM:    Vasular: DP/PT 1_/4, B/L, CFT <_2 seconds B/L, Temperature gradient wnl_, B/L.   Neuro: Epicritic sensation diminished to the level of toes, B/L.  Skin: Positive partially avulsed right hallux nail with dried subungual hematoma. Nail bed is dry without ulceration. No purulence/malodor or clinical signs of infection. Positive xerosis bilateral foot. Positive dystrophic, hypertrophic brittle toenails with subungual debris x10

## 2020-03-25 DIAGNOSIS — I50.21 ACUTE SYSTOLIC (CONGESTIVE) HEART FAILURE: ICD-10-CM

## 2020-03-25 DIAGNOSIS — Z59.0 HOMELESSNESS: ICD-10-CM

## 2020-03-25 LAB
ANION GAP SERPL CALC-SCNC: 9 MMO/L — SIGNIFICANT CHANGE UP (ref 7–14)
BUN SERPL-MCNC: 20 MG/DL — SIGNIFICANT CHANGE UP (ref 7–23)
CALCIUM SERPL-MCNC: 8.9 MG/DL — SIGNIFICANT CHANGE UP (ref 8.4–10.5)
CHLORIDE SERPL-SCNC: 101 MMOL/L — SIGNIFICANT CHANGE UP (ref 98–107)
CO2 SERPL-SCNC: 27 MMOL/L — SIGNIFICANT CHANGE UP (ref 22–31)
CREAT SERPL-MCNC: 0.76 MG/DL — SIGNIFICANT CHANGE UP (ref 0.5–1.3)
GLUCOSE SERPL-MCNC: 97 MG/DL — SIGNIFICANT CHANGE UP (ref 70–99)
HCT VFR BLD CALC: 39.9 % — SIGNIFICANT CHANGE UP (ref 39–50)
HGB BLD-MCNC: 12.5 G/DL — LOW (ref 13–17)
MAGNESIUM SERPL-MCNC: 2.1 MG/DL — SIGNIFICANT CHANGE UP (ref 1.6–2.6)
MCHC RBC-ENTMCNC: 29.3 PG — SIGNIFICANT CHANGE UP (ref 27–34)
MCHC RBC-ENTMCNC: 31.3 % — LOW (ref 32–36)
MCV RBC AUTO: 93.4 FL — SIGNIFICANT CHANGE UP (ref 80–100)
NRBC # FLD: 0 K/UL — SIGNIFICANT CHANGE UP (ref 0–0)
PHOSPHATE SERPL-MCNC: 3.4 MG/DL — SIGNIFICANT CHANGE UP (ref 2.5–4.5)
PLATELET # BLD AUTO: 530 K/UL — HIGH (ref 150–400)
PMV BLD: 8.6 FL — SIGNIFICANT CHANGE UP (ref 7–13)
POTASSIUM SERPL-MCNC: 4.3 MMOL/L — SIGNIFICANT CHANGE UP (ref 3.5–5.3)
POTASSIUM SERPL-SCNC: 4.3 MMOL/L — SIGNIFICANT CHANGE UP (ref 3.5–5.3)
RBC # BLD: 4.27 M/UL — SIGNIFICANT CHANGE UP (ref 4.2–5.8)
RBC # FLD: 13.4 % — SIGNIFICANT CHANGE UP (ref 10.3–14.5)
SODIUM SERPL-SCNC: 137 MMOL/L — SIGNIFICANT CHANGE UP (ref 135–145)
WBC # BLD: 5.82 K/UL — SIGNIFICANT CHANGE UP (ref 3.8–10.5)
WBC # FLD AUTO: 5.82 K/UL — SIGNIFICANT CHANGE UP (ref 3.8–10.5)

## 2020-03-25 PROCEDURE — 93010 ELECTROCARDIOGRAM REPORT: CPT

## 2020-03-25 PROCEDURE — 99222 1ST HOSP IP/OBS MODERATE 55: CPT | Mod: GC

## 2020-03-25 PROCEDURE — 93306 TTE W/DOPPLER COMPLETE: CPT | Mod: 26

## 2020-03-25 PROCEDURE — 99233 SBSQ HOSP IP/OBS HIGH 50: CPT

## 2020-03-25 RX ORDER — ASPIRIN/CALCIUM CARB/MAGNESIUM 324 MG
81 TABLET ORAL DAILY
Refills: 0 | Status: DISCONTINUED | OUTPATIENT
Start: 2020-03-25 | End: 2020-03-26

## 2020-03-25 RX ORDER — METOPROLOL TARTRATE 50 MG
50 TABLET ORAL DAILY
Refills: 0 | Status: DISCONTINUED | OUTPATIENT
Start: 2020-03-25 | End: 2020-03-26

## 2020-03-25 RX ORDER — LISINOPRIL 2.5 MG/1
2.5 TABLET ORAL DAILY
Refills: 0 | Status: DISCONTINUED | OUTPATIENT
Start: 2020-03-25 | End: 2020-03-26

## 2020-03-25 RX ORDER — ATORVASTATIN CALCIUM 80 MG/1
40 TABLET, FILM COATED ORAL AT BEDTIME
Refills: 0 | Status: DISCONTINUED | OUTPATIENT
Start: 2020-03-25 | End: 2020-03-26

## 2020-03-25 RX ORDER — METOPROLOL TARTRATE 50 MG
50 TABLET ORAL DAILY
Refills: 0 | Status: DISCONTINUED | OUTPATIENT
Start: 2020-03-25 | End: 2020-03-25

## 2020-03-25 RX ADMIN — HEPARIN SODIUM 5000 UNIT(S): 5000 INJECTION INTRAVENOUS; SUBCUTANEOUS at 05:33

## 2020-03-25 RX ADMIN — FLUPHENAZINE HYDROCHLORIDE 5 MILLIGRAM(S): 1 TABLET, FILM COATED ORAL at 18:11

## 2020-03-25 RX ADMIN — Medication 0.5 MILLIGRAM(S): at 05:34

## 2020-03-25 RX ADMIN — DIVALPROEX SODIUM 750 MILLIGRAM(S): 500 TABLET, DELAYED RELEASE ORAL at 05:34

## 2020-03-25 RX ADMIN — FLUPHENAZINE HYDROCHLORIDE 5 MILLIGRAM(S): 1 TABLET, FILM COATED ORAL at 05:34

## 2020-03-25 RX ADMIN — Medication 1: at 07:55

## 2020-03-25 RX ADMIN — CEFTRIAXONE 100 MILLIGRAM(S): 500 INJECTION, POWDER, FOR SOLUTION INTRAMUSCULAR; INTRAVENOUS at 18:10

## 2020-03-25 RX ADMIN — RISPERIDONE 1 MILLIGRAM(S): 4 TABLET ORAL at 18:10

## 2020-03-25 RX ADMIN — DIVALPROEX SODIUM 750 MILLIGRAM(S): 500 TABLET, DELAYED RELEASE ORAL at 18:12

## 2020-03-25 RX ADMIN — AZITHROMYCIN 500 MILLIGRAM(S): 500 TABLET, FILM COATED ORAL at 12:13

## 2020-03-25 RX ADMIN — HEPARIN SODIUM 5000 UNIT(S): 5000 INJECTION INTRAVENOUS; SUBCUTANEOUS at 18:10

## 2020-03-25 RX ADMIN — RISPERIDONE 1 MILLIGRAM(S): 4 TABLET ORAL at 05:34

## 2020-03-25 RX ADMIN — Medication 0.5 MILLIGRAM(S): at 18:10

## 2020-03-25 RX ADMIN — TIOTROPIUM BROMIDE 1 CAPSULE(S): 18 CAPSULE ORAL; RESPIRATORY (INHALATION) at 11:48

## 2020-03-25 SDOH — ECONOMIC STABILITY - HOUSING INSECURITY: HOMELESSNESS: Z59.0

## 2020-03-25 NOTE — PROGRESS NOTE ADULT - PROBLEM SELECTOR PLAN 1
- improving hypoxia with diuresis,  possibly in setting of fluid overload and/or infection given CT findings  - RVP and COVID negative  - will c/w albuterol and ipratropium 97.9

## 2020-03-25 NOTE — PROGRESS NOTE ADULT - PROBLEM SELECTOR PLAN 2
Patient with concern for possible heart failure given lower extremity swelling, pulmonary edema.  - TTE shows severe sys dysfunction, Cardiology consulted  - will initate low dose ACEi  - strict i/o  -Hold Lasix given hypotension

## 2020-03-25 NOTE — PROGRESS NOTE ADULT - PROBLEM SELECTOR PLAN 4
Pt with chronic wounds bilaterally, unlikely to be infected  -s/p toenail removal per Podiatry  - c/w monitoring and wound care

## 2020-03-25 NOTE — CONSULT NOTE ADULT - ASSESSMENT
A/P:    59 year old undomiciled man with history of schizoaffective disorder, presenting with cough , SOB, ruled out for COVID 19, with new TTE findings of systolic heart failure with EF of 25%. He will eventually need an ischemic evaluation which does not have to be done as inpatient. He has evidence of LVH on TTE. He is currently euvolemic and appears to be well compensated. His proBNP has also trended down since admission.     Plan:   # systolic heart failure   - continue Lisinopril 2.5 mg daily, if BP permits, can increase dose to 5 mg daily.   - would start metoprolol succinate 50 mg daily   - would start ASA 81 mg and high intensity statin - Lipitor 40 mg daily   - plan for outpatient ischemic evaluation.   - maintain on Lasix 20 mg PO daily   - plan to uptitrate CHF meds as outpatient   -can start d/c planning for this patient     David Pompa MD   Cardiology fellow  x [phone]

## 2020-03-25 NOTE — CONSULT NOTE ADULT - SUBJECTIVE AND OBJECTIVE BOX
Patient seen and evaluated at bedside    Chief Complaint: SOB , cough     HPI:  59M PMH schizoaffective disorder presenting with swelling in legs x 5 months.  Patient states that he has been having worsening swelling in his legs since his sister .  Patient states he went to Urgent Care today for evaluation of his legs.  Because he was having a cough, he was sent to ED for further care.  Denies fevers, chills at this time.  Patient states he has a long standing cough, but his legs are what are bothering him the most.      In the ED, patient was tachycardic to 112.  Patient was given lasix 40 mg in the ED with 2L output per nurse.  Patient was desatting to 85% while in ED and was placed on NC.     Hospital course:   - he was ruled out for COVID, noted to have volume overload state per primary team so was treated with Lasix and diuresed well. His TTE today noted to have EF of 25%, unclear if new or old, but cardiology consulted for continued follow up of CHF       PMH:   Schizoaffective disorder      PSH:   No significant past surgical history      Medications:   acetaminophen   Tablet .. 650 milliGRAM(s) Oral every 6 hours PRN  ALBUTerol    90 MICROgram(s) HFA Inhaler 2 Puff(s) Inhalation every 6 hours PRN  azithromycin   Tablet 500 milliGRAM(s) Oral daily  benztropine 0.5 milliGRAM(s) Oral two times a day  cefTRIAXone   IVPB 1000 milliGRAM(s) IV Intermittent every 24 hours  dextrose 40% Gel 15 Gram(s) Oral once PRN  dextrose 5%. 1000 milliLiter(s) IV Continuous <Continuous>  dextrose 50% Injectable 12.5 Gram(s) IV Push once  dextrose 50% Injectable 25 Gram(s) IV Push once  dextrose 50% Injectable 25 Gram(s) IV Push once  diVALproex  milliGRAM(s) Oral two times a day  fluPHENAZine 5 milliGRAM(s) Oral two times a day  glucagon  Injectable 1 milliGRAM(s) IntraMuscular once PRN  heparin  Injectable 5000 Unit(s) SubCutaneous every 12 hours  insulin lispro (HumaLOG) corrective regimen sliding scale   SubCutaneous three times a day before meals  insulin lispro (HumaLOG) corrective regimen sliding scale   SubCutaneous at bedtime  lisinopril 2.5 milliGRAM(s) Oral daily  risperiDONE   Tablet 1 milliGRAM(s) Oral two times a day  tiotropium 18 MICROgram(s) Capsule 1 Capsule(s) Inhalation daily      Allergies:  No Known Allergies      Physical Exam:  T(F): 98.5 (-), Max: 99.6 (-)  HR: 100 () (92 - 115)  BP: 109/59 (-) (105/62 - 111/60)  RR: 18 (-)  SpO2: 94% (-)    GENERAL: No acute distress, well-developed  HEAD:  Atraumatic, Normocephalic  ENT: EOMI, PERRLA, conjunctiva and sclera clear, Neck supple, No JVD, moist mucosa  CHEST/LUNG: Clear to auscultation bilaterally; No wheeze, equal breath sounds bilaterally   BACK: No spinal tenderness  HEART: Regular rate and rhythm; No murmurs, rubs, or gallops  ABDOMEN: Soft, Nontender, Nondistended; Bowel sounds present  EXTREMITIES:  No clubbing, cyanosis, or edema  PSYCH: Nl behavior, nl affect  NEUROLOGY: AAOx3, non-focal, cranial nerves intact  SKIN: Normal color, No rashes or lesions  LINES:    Cardiovascular Diagnostic Testing:    ECG: Personally reviewed    Echo:    < from: Transthoracic Echocardiogram (20 @ 06:28) >  OBSERVATIONS:  Mitral Valve: Normal mitral valve. Minimal mitral  regurgitation.  Aortic Root: Normal aortic root.  Aortic Valve: Normal trileaflet aortic valve.  Left Atrium: Normal left atrium.  LA volume index = 18  cc/m2.  Left Ventricle: Severe global left ventricular systolic  dysfunction. Eccentric left ventricular hypertrophy  (dilated left ventricle with normal relative wall  thickness).  Right Heart: Normal right atrium. Normal right ventricular  size and function. Normal tricuspid valve. Minimal  tricuspid regurgitation. Normal pulmonic valve. Mild  pulmonic regurgitation.  Pericardium/PleuraNormal pericardium with no pericardial  effusion.  ------------------------------------------------------------------------  CONCLUSIONS:  1. Normal mitral valve. Minimal mitral regurgitation.  2. Eccentric left ventricular hypertrophy (dilated left  ventricle with normal relative wall thickness).  3. Severe global left ventricular systolic dysfunction.  4. Normal right ventricular size and function.  *** No previous Echo exam.    < end of copied text >      Stress Testing:    Cath:  none     Interpretation of Telemetry:    Imaging:  < from: CT Angio Chest w/ IV Cont (20 @ 17:55) >  IMPRESSION:   No pulmonary embolism is identified although evaluation of some of the segmental and many of the subsegmental or smaller pulmonary arterial branches evident in the lower lungs are limited due to nondiagnostic due to superimposed respiratory motion artifact.    Interval decreased interlobular septal thickening when compared to prior examination 3/22/2020 at 2:09 AM representing improved or resolved pulmonary edema.    Right upper lobe mixed solid and groundglass nodular opacity slightly improved likely related to the underlying pulmonary edema given the other findings.    Bilateral mid to lower lung bronchiectasis. Cluster of impacted distal airways within the right upper lobe is unchanged to be due to mucous or may be of infectious etiology.    3 month follow-up noncontrast chest CT is recommended for further evaluation of the above findings.    Mild dilatation of the aortic root at the sinus of Valsalva measuring about 4 cm.      < end of copied text >      Labs: Personally reviewed                        12.5   5.82  )-----------( 530      ( 25 Mar 2020 05:50 )             39.9     03-25    137  |  101  |  20  ----------------------------<  97  4.3   |  27  |  0.76    Ca    8.9      25 Mar 2020 05:50  Phos  3.4     -  Mg     2.1     -    TPro  6.9  /  Alb  2.8<L>  /  TBili  0.3  /  DBili  x   /  AST  18  /  ALT  12  /  AlkPhos  69            Serum Pro-Brain Natriuretic Peptide: 958.4 pg/mL ( @ 06:30)  Serum Pro-Brain Natriuretic Peptide: 1341 pg/mL ( @ 20:39)

## 2020-03-25 NOTE — PROVIDER CONTACT NOTE (OTHER) - ACTION/TREATMENT ORDERED:
ECG ordered. No further orders at this time. Continue to monitor the pt.
awaiting orders
2 LN oxygen per nasal cannula ordered as needed for oxygen saturation < 94%. Pt previously ordered for continuous pulse ox.
Cardiac monitoring previously ordered

## 2020-03-25 NOTE — PROGRESS NOTE ADULT - SUBJECTIVE AND OBJECTIVE BOX
[facility] Division of Hospital Medicine  Saroj Huitron MD  Pager [phone]      Patient is a 59y old  Male who presents with a chief complaint of r/o COVID/hypoxia (24 Mar 2020 14:07)      SUBJECTIVE / OVERNIGHT EVENTS: Improved leg mobility and SOB. No fever or chills    MEDICATIONS  (STANDING):  azithromycin   Tablet 500 milliGRAM(s) Oral daily  benztropine 0.5 milliGRAM(s) Oral two times a day  cefTRIAXone   IVPB 1000 milliGRAM(s) IV Intermittent every 24 hours  dextrose 5%. 1000 milliLiter(s) (50 mL/Hr) IV Continuous <Continuous>  dextrose 50% Injectable 12.5 Gram(s) IV Push once  dextrose 50% Injectable 25 Gram(s) IV Push once  dextrose 50% Injectable 25 Gram(s) IV Push once  diVALproex  milliGRAM(s) Oral two times a day  fluPHENAZine 5 milliGRAM(s) Oral two times a day  heparin  Injectable 5000 Unit(s) SubCutaneous every 12 hours  insulin lispro (HumaLOG) corrective regimen sliding scale   SubCutaneous three times a day before meals  insulin lispro (HumaLOG) corrective regimen sliding scale   SubCutaneous at bedtime  risperiDONE   Tablet 1 milliGRAM(s) Oral two times a day  tiotropium 18 MICROgram(s) Capsule 1 Capsule(s) Inhalation daily    MEDICATIONS  (PRN):  acetaminophen   Tablet .. 650 milliGRAM(s) Oral every 6 hours PRN Temp greater or equal to 38C (100.4F), Mild Pain (1 - 3), Moderate Pain (4 - 6), Severe Pain (7 - 10)  ALBUTerol    90 MICROgram(s) HFA Inhaler 2 Puff(s) Inhalation every 6 hours PRN Shortness of Breath and/or Wheezing  dextrose 40% Gel 15 Gram(s) Oral once PRN Blood Glucose LESS THAN 70 milliGRAM(s)/deciliter  glucagon  Injectable 1 milliGRAM(s) IntraMuscular once PRN Glucose LESS THAN 70 milligrams/deciliter      CAPILLARY BLOOD GLUCOSE      POCT Blood Glucose.: 72 mg/dL (25 Mar 2020 12:15)  POCT Blood Glucose.: 157 mg/dL (25 Mar 2020 07:52)  POCT Blood Glucose.: 86 mg/dL (24 Mar 2020 22:27)  POCT Blood Glucose.: 85 mg/dL (24 Mar 2020 17:11)    I&O's Summary    24 Mar 2020 07:01  -  25 Mar 2020 07:00  --------------------------------------------------------  IN: 534 mL / OUT: 1000 mL / NET: -466 mL        PHYSICAL EXAM:  Vital Signs Last 24 Hrs  T(C): 36.9 (25 Mar 2020 14:34), Max: 37.6 (24 Mar 2020 22:34)  T(F): 98.5 (25 Mar 2020 14:34), Max: 99.6 (24 Mar 2020 22:34)  HR: 100 (25 Mar 2020 14:34) (92 - 115)  BP: 109/59 (25 Mar 2020 14:34) (105/62 - 111/60)  BP(mean): --  RR: 18 (25 Mar 2020 14:34) (18 - 18)  SpO2: 94% (25 Mar 2020 14:34) (94% - 99%)    CONSTITUTIONAL: NAD  EYES: conjunctiva and sclera clear  ENMT: mmm  NECK: Supple,  RESPIRATORY: Normal respiratory effort; lungs are clear to auscultation bilaterally  CARDIOVASCULAR: Regular rate and rhythm, + S1 and S2  ABDOMEN: Nontender to palpation, normoactive bowel sounds, no rebound/guarding  MUSCULOSKELETAL:  no clubbing or cyanosis of digits;   PSYCH: A+O x 2-3      LABS:                        12.5   5.82  )-----------( 530      ( 25 Mar 2020 05:50 )             39.9     03-25    137  |  101  |  20  ----------------------------<  97  4.3   |  27  |  0.76    Ca    8.9      25 Mar 2020 05:50  Phos  3.4     03-25  Mg     2.1     03-25    TPro  6.9  /  Alb  2.8<L>  /  TBili  0.3  /  DBili  x   /  AST  18  /  ALT  12  /  AlkPhos  69  03-24

## 2020-03-25 NOTE — PROVIDER CONTACT NOTE (OTHER) - ASSESSMENT
Pt is asymptomatic. Pt denies CP, SOB, palpitations. BP: 110/72
Patient received on unit alert and oriented X4. Denies chest pain and shortness of breath.
Patient received on unit alert and oriented X4. Denies chest pain and shortness of breath. No cp/sob. /71, , RR 17 94% on 2LNC. 98.2F
Pt with no c/o pain, sob, dizziness, n/v/d, no palpitations. BP 99/53. No distress observed. Sinu tachycardia observed on the monitor.

## 2020-03-25 NOTE — PROGRESS NOTE ADULT - PROBLEM SELECTOR PLAN 3
given multinodular opacities on CT with hypoxia and neg RVP/COVID, will cover with Abx  c/w ceftriaxone and azithromycin  f/u  BCx given hypotension, tachycardia  Hold diuresis

## 2020-03-25 NOTE — PROGRESS NOTE ADULT - PROBLEM SELECTOR PLAN 5
Pt with schizo-affective disorder.    - Pt only had fluphenazine BID in bag  -Psych consulted, c/w current meds

## 2020-03-25 NOTE — CONSULT NOTE ADULT - ATTENDING COMMENTS
The patient was seen and examined with the cardiology consultation fellow.    He is a 59-year-old man with significant psychiatric disease who presented with lower extremity edema for several weeks, found to have a new cardiomyopathy, LVEF 25%. The patient currently denies dyspnea, orthopnea or PND. He also denies ever experiencing chest pain. He gives no family history of cardiomyopathy or heart failure, and denies alcohol or drug abuse.    On examination, the patient is comfortable-appearing and in no acute distress. He appears euvolemic and is comfortable in a complete recumbent position. His lower extremities have no significant peripheral edema. ECG demonstrates sinus rhythm with LVH. On echocardiography, there is severe LV dysfunction, LV dilatation and eccentric hypertrophy.     I agree with the assessment and recommendations noted above. The patient gives no history that suggests a specific etiology of his cardiomyopathy. There is no suggestion of prior myocardial infarction or angina. His echocardiography findings suggest some chronicity to this process, and the lack of focal segmental wall motion abnormalities might implicate a non-infarction-related cause. I agree that medical therapy with beta-blocker, ACE-inhibitor or ARB are appropriate, as is a maintenance diuretic. He will require follow-up in the outpatient setting to assess his NYHA class, and reassess the need for additional pharmacotherapy such as ARNI and/or spironolactone. After three months of appropriate medical therapy, outpatient echocardiography should be repeated and if his LVEF remains low, ischemic causes of cardiomyopathy should be formally excluded.    Rodrigue Bland MD  Cardiology

## 2020-03-26 ENCOUNTER — TRANSCRIPTION ENCOUNTER (OUTPATIENT)
Age: 60
End: 2020-03-26

## 2020-03-26 VITALS
HEART RATE: 100 BPM | OXYGEN SATURATION: 95 % | TEMPERATURE: 98 F | SYSTOLIC BLOOD PRESSURE: 107 MMHG | DIASTOLIC BLOOD PRESSURE: 69 MMHG | RESPIRATION RATE: 19 BRPM

## 2020-03-26 LAB
ANION GAP SERPL CALC-SCNC: 9 MMO/L — SIGNIFICANT CHANGE UP (ref 7–14)
BUN SERPL-MCNC: 21 MG/DL — SIGNIFICANT CHANGE UP (ref 7–23)
CALCIUM SERPL-MCNC: 9.1 MG/DL — SIGNIFICANT CHANGE UP (ref 8.4–10.5)
CHLORIDE SERPL-SCNC: 100 MMOL/L — SIGNIFICANT CHANGE UP (ref 98–107)
CO2 SERPL-SCNC: 28 MMOL/L — SIGNIFICANT CHANGE UP (ref 22–31)
CREAT SERPL-MCNC: 0.75 MG/DL — SIGNIFICANT CHANGE UP (ref 0.5–1.3)
GLUCOSE SERPL-MCNC: 92 MG/DL — SIGNIFICANT CHANGE UP (ref 70–99)
HCT VFR BLD CALC: 42.4 % — SIGNIFICANT CHANGE UP (ref 39–50)
HGB BLD-MCNC: 13 G/DL — SIGNIFICANT CHANGE UP (ref 13–17)
MCHC RBC-ENTMCNC: 29.2 PG — SIGNIFICANT CHANGE UP (ref 27–34)
MCHC RBC-ENTMCNC: 30.7 % — LOW (ref 32–36)
MCV RBC AUTO: 95.3 FL — SIGNIFICANT CHANGE UP (ref 80–100)
NRBC # FLD: 0 K/UL — SIGNIFICANT CHANGE UP (ref 0–0)
PLATELET # BLD AUTO: 519 K/UL — HIGH (ref 150–400)
PMV BLD: 8.7 FL — SIGNIFICANT CHANGE UP (ref 7–13)
POTASSIUM SERPL-MCNC: 4.3 MMOL/L — SIGNIFICANT CHANGE UP (ref 3.5–5.3)
POTASSIUM SERPL-SCNC: 4.3 MMOL/L — SIGNIFICANT CHANGE UP (ref 3.5–5.3)
RBC # BLD: 4.45 M/UL — SIGNIFICANT CHANGE UP (ref 4.2–5.8)
RBC # FLD: 13.5 % — SIGNIFICANT CHANGE UP (ref 10.3–14.5)
SODIUM SERPL-SCNC: 137 MMOL/L — SIGNIFICANT CHANGE UP (ref 135–145)
WBC # BLD: 4.76 K/UL — SIGNIFICANT CHANGE UP (ref 3.8–10.5)
WBC # FLD AUTO: 4.76 K/UL — SIGNIFICANT CHANGE UP (ref 3.8–10.5)

## 2020-03-26 PROCEDURE — 99239 HOSP IP/OBS DSCHRG MGMT >30: CPT

## 2020-03-26 RX ORDER — METOPROLOL TARTRATE 50 MG
1 TABLET ORAL
Qty: 30 | Refills: 0
Start: 2020-03-26 | End: 2020-04-24

## 2020-03-26 RX ORDER — FUROSEMIDE 40 MG
20 TABLET ORAL DAILY
Refills: 0 | Status: DISCONTINUED | OUTPATIENT
Start: 2020-03-26 | End: 2020-03-26

## 2020-03-26 RX ORDER — ATORVASTATIN CALCIUM 80 MG/1
1 TABLET, FILM COATED ORAL
Qty: 30 | Refills: 0
Start: 2020-03-26 | End: 2020-04-24

## 2020-03-26 RX ORDER — FUROSEMIDE 40 MG
1 TABLET ORAL
Qty: 30 | Refills: 0
Start: 2020-03-26 | End: 2020-04-24

## 2020-03-26 RX ORDER — LISINOPRIL 2.5 MG/1
1 TABLET ORAL
Qty: 30 | Refills: 0
Start: 2020-03-26 | End: 2020-04-24

## 2020-03-26 RX ORDER — ASPIRIN/CALCIUM CARB/MAGNESIUM 324 MG
1 TABLET ORAL
Qty: 30 | Refills: 0
Start: 2020-03-26 | End: 2020-04-24

## 2020-03-26 RX ORDER — RISPERIDONE 4 MG/1
1 TABLET ORAL
Qty: 60 | Refills: 0
Start: 2020-03-26 | End: 2020-04-24

## 2020-03-26 RX ORDER — RISPERIDONE 4 MG/1
1 TABLET ORAL
Qty: 0 | Refills: 0 | DISCHARGE

## 2020-03-26 RX ADMIN — Medication 0.5 MILLIGRAM(S): at 05:58

## 2020-03-26 RX ADMIN — AZITHROMYCIN 500 MILLIGRAM(S): 500 TABLET, FILM COATED ORAL at 12:32

## 2020-03-26 RX ADMIN — LISINOPRIL 2.5 MILLIGRAM(S): 2.5 TABLET ORAL at 12:38

## 2020-03-26 RX ADMIN — Medication 50 MILLIGRAM(S): at 05:58

## 2020-03-26 RX ADMIN — HEPARIN SODIUM 5000 UNIT(S): 5000 INJECTION INTRAVENOUS; SUBCUTANEOUS at 05:58

## 2020-03-26 RX ADMIN — RISPERIDONE 1 MILLIGRAM(S): 4 TABLET ORAL at 05:59

## 2020-03-26 RX ADMIN — Medication 81 MILLIGRAM(S): at 12:38

## 2020-03-26 RX ADMIN — DIVALPROEX SODIUM 750 MILLIGRAM(S): 500 TABLET, DELAYED RELEASE ORAL at 05:59

## 2020-03-26 RX ADMIN — Medication 20 MILLIGRAM(S): at 05:58

## 2020-03-26 RX ADMIN — FLUPHENAZINE HYDROCHLORIDE 5 MILLIGRAM(S): 1 TABLET, FILM COATED ORAL at 05:58

## 2020-03-26 RX ADMIN — TIOTROPIUM BROMIDE 1 CAPSULE(S): 18 CAPSULE ORAL; RESPIRATORY (INHALATION) at 12:32

## 2020-03-26 NOTE — PROGRESS NOTE ADULT - REASON FOR ADMISSION
r/o COVID/hypoxia

## 2020-03-26 NOTE — PROGRESS NOTE ADULT - SUBJECTIVE AND OBJECTIVE BOX
[facility] Division of Hospital Medicine  Saroj Huitron MD  Pager [phone]      Patient is a 59y old  Male who presents with a chief complaint of r/o COVID/hypoxia (25 Mar 2020 17:36)      SUBJECTIVE / OVERNIGHT EVENTS: No SOB or CP    MEDICATIONS  (STANDING):  aspirin enteric coated 81 milliGRAM(s) Oral daily  atorvastatin 40 milliGRAM(s) Oral at bedtime  azithromycin   Tablet 500 milliGRAM(s) Oral daily  benztropine 0.5 milliGRAM(s) Oral two times a day  cefTRIAXone   IVPB 1000 milliGRAM(s) IV Intermittent every 24 hours  dextrose 5%. 1000 milliLiter(s) (50 mL/Hr) IV Continuous <Continuous>  dextrose 50% Injectable 12.5 Gram(s) IV Push once  dextrose 50% Injectable 25 Gram(s) IV Push once  dextrose 50% Injectable 25 Gram(s) IV Push once  diVALproex  milliGRAM(s) Oral two times a day  fluPHENAZine 5 milliGRAM(s) Oral two times a day  furosemide    Tablet 20 milliGRAM(s) Oral daily  heparin  Injectable 5000 Unit(s) SubCutaneous every 12 hours  insulin lispro (HumaLOG) corrective regimen sliding scale   SubCutaneous three times a day before meals  insulin lispro (HumaLOG) corrective regimen sliding scale   SubCutaneous at bedtime  lisinopril 2.5 milliGRAM(s) Oral daily  metoprolol succinate ER 50 milliGRAM(s) Oral daily  risperiDONE   Tablet 1 milliGRAM(s) Oral two times a day  tiotropium 18 MICROgram(s) Capsule 1 Capsule(s) Inhalation daily    MEDICATIONS  (PRN):  acetaminophen   Tablet .. 650 milliGRAM(s) Oral every 6 hours PRN Temp greater or equal to 38C (100.4F), Mild Pain (1 - 3), Moderate Pain (4 - 6), Severe Pain (7 - 10)  ALBUTerol    90 MICROgram(s) HFA Inhaler 2 Puff(s) Inhalation every 6 hours PRN Shortness of Breath and/or Wheezing  dextrose 40% Gel 15 Gram(s) Oral once PRN Blood Glucose LESS THAN 70 milliGRAM(s)/deciliter  glucagon  Injectable 1 milliGRAM(s) IntraMuscular once PRN Glucose LESS THAN 70 milligrams/deciliter      CAPILLARY BLOOD GLUCOSE      POCT Blood Glucose.: 93 mg/dL (26 Mar 2020 12:00)  POCT Blood Glucose.: 99 mg/dL (26 Mar 2020 07:38)  POCT Blood Glucose.: 119 mg/dL (25 Mar 2020 21:57)  POCT Blood Glucose.: 90 mg/dL (25 Mar 2020 18:05)    I&O's Summary    25 Mar 2020 07:01  -  26 Mar 2020 07:00  --------------------------------------------------------  IN: 800 mL / OUT: 950 mL / NET: -150 mL    26 Mar 2020 07:01  -  26 Mar 2020 14:48  --------------------------------------------------------  IN: 0 mL / OUT: 925 mL / NET: -925 mL        PHYSICAL EXAM:  Vital Signs Last 24 Hrs  T(C): 36.9 (26 Mar 2020 14:12), Max: 36.9 (26 Mar 2020 14:12)  T(F): 98.4 (26 Mar 2020 14:12), Max: 98.4 (26 Mar 2020 14:12)  HR: 100 (26 Mar 2020 14:12) (100 - 115)  BP: 107/69 (26 Mar 2020 14:12) (98/72 - 126/73)  BP(mean): 78 (26 Mar 2020 05:52) (70 - 78)  RR: 19 (26 Mar 2020 14:12) (16 - 19)  SpO2: 95% (26 Mar 2020 14:12) (92% - 97%)    CONSTITUTIONAL: NAD  EYES: conjunctiva and sclera clear  ENMT: mmm  NECK: Supple,  RESPIRATORY: Normal respiratory effort; lungs are clear to auscultation bilaterally  CARDIOVASCULAR: Regular rate and rhythm, + S1 and S2  ABDOMEN: Nontender to palpation, normoactive bowel sounds, no rebound/guarding  MUSCULOSKELETAL:  no clubbing  PSYCH: A+O x 2-3      LABS:                        13.0   4.76  )-----------( 519      ( 26 Mar 2020 05:26 )             42.4     03-26    137  |  100  |  21  ----------------------------<  92  4.3   |  28  |  0.75    Ca    9.1      26 Mar 2020 05:26  Phos  3.4     03-25  Mg     2.1     03-25                Culture - Blood (collected 25 Mar 2020 01:58)  Source: .Blood Blood-Venous  Preliminary Report (26 Mar 2020 02:01):    No growth to date.    Culture - Blood (collected 25 Mar 2020 01:58)  Source: .Blood Blood-Peripheral  Preliminary Report (26 Mar 2020 02:01):    No growth to date.

## 2020-03-26 NOTE — PROGRESS NOTE ADULT - PROBLEM SELECTOR PLAN 3
given multinodular opacities on CT with hypoxia and neg RVP/COVID, will cover with Abx  c/w ceftriaxone and azithromycin, will not dc on Abx  f/u  BCx NGTD

## 2020-03-26 NOTE — DISCHARGE NOTE PROVIDER - HOSPITAL COURSE
59M PMH schizoaffective disorder presented with hypoxia and increased leg swelling.                  ·  Problem: Hypoxia.  improved with diuresis    - RVP and COVID negative                ·  Problem: Acute systolic congestive heart failure.  Plan: Patient with concern for possible heart failure given lower extremity swelling, pulmonary edema.    - TTE shows severe sys dysfunction, Cardiology consulted. Pt. instructed to follow up with  cardiology clinic as outpatient    He is currently euvolemic and appears to be well compensated. His proBNP has also trended down since admission.     Pt. is now satting well on room air.    continue Lisinopril 2.5 mg daily    - start metoprolol succinate 50 mg daily     - start ASA 81 mg and high intensity statin - Lipitor 40 mg daily     - plan for outpatient ischemic evaluation in cardiology clinic. Pt. instructed to call for appt.  [phone]    - maintain on Lasix 20 mg PO daily     - plan to uptitrate CHF meds as outpatient                 PNA (pneumonia).  Plan: given multinodular opacities on CT with hypoxia and neg RVP/COVID,  Blood Cx negative   Antibiotic discontinued upon discharge.                ·  Problem:  Partially avulsed right hallux nail    -s/p toenail removal per Podiatry    -aseptic removal of right hallux nail plate with hemostat with peroxide flush/cleanse with bacitracin applied    --recommend continued routine podiatric foot care as outpatient        Upon discharge, Recommend follow up care at [facility]: [phone]. Address: [address] [place] [address] for left malleolus wound.     Monitor right great toe nail bed for tissue type changes.                     ·  Problem: Schizoaffective disorder.          -Psych consulted, c/w current meds.     Recommendations:    continue with all standing psychotropic medications    pt can f/u with his PCP, his outpatient psychiatrist (if he has one), or f/u at [facility] [phone]                    Type 2 diabetes mellitus without complication, without long-term current use of insulin.   Pt. with HbA1c 6.5 at last visit; not on home meds    Diabetic diet.                 Pt. cleared for discharge by psych, cardiology and Attending.    Pt. is feeling well. 59M [facility] schizoaffective disorder presented with hypoxia and increased leg swelling. Pt was admitted for hypoxia. RVP and COVID neg. Hypoxia likely due to fluid overload in the setting of acute systolic HF. Cardiology was consulted. ACEi, BBlocker and statin was started.        plan for outpatient ischemic evaluation in cardiology clinic. Pt. instructed to call for appt.  [phone]    - maintain on Lasix 20 mg PO daily     - plan to uptitrate CHF meds as outpatient                 PNA (pneumonia).  Plan: given multinodular opacities on CT with hypoxia and neg RVP/COVID,  Blood Cx negative   Antibiotic completed inpt                ·  Problem:  Partially avulsed right hallux nail    -s/p toenail removal per Podiatry    -aseptic removal of right hallux nail plate with hemostat with peroxide flush/cleanse with bacitracin applied    --recommend continued routine podiatric foot care as outpatient        Upon discharge, Recommend follow up care at [facility]: [phone]. Address: [address] [place] [address] for left malleolus wound.     Monitor right great toe nail bed for tissue type changes.                     ·  Problem: Schizoaffective disorder.          -Psych consulted, c/w current meds.     Recommendations:    continue with all standing psychotropic medications    pt can f/u with his PCP, his outpatient psychiatrist (if he has one), or f/u at [facility] [phone]                            Pt. cleared for discharge by psych, cardiology and Attending.    Pt. is feeling well.

## 2020-03-26 NOTE — DISCHARGE NOTE PROVIDER - NSDCCPCAREPLAN_GEN_ALL_CORE_FT
PRINCIPAL DISCHARGE DIAGNOSIS  Diagnosis: Acute systolic congestive heart failure  Assessment and Plan of Treatment: Low salt diet, fluid restriction to 1500 ml daily, monitor your fluid intake and weight daily, exercise as tolerated 30 minutes daily, and follow up with your physician within 1 to 2 weeks. Take your medications as prescribed and follow up with the cardiology clinic. [phone]  Call for appointment.      SECONDARY DISCHARGE DIAGNOSES  Diagnosis: Schizoaffective disorder  Assessment and Plan of Treatment: Continue to take Medications. Follow up with your outpatient Psychiatrist Dr. Guerra for routine care and medication adjustments.   or go to the crisis clinic [phone]    Diagnosis: Type 2 diabetes mellitus without complication, without long-term current use of insulin  Assessment and Plan of Treatment: You should continue to follow up with your Primary care physician and avoid excessive carbohydrates and exercise as tolerated    Diagnosis: Wound of ankle, unspecified laterality, subsequent encounter  Assessment and Plan of Treatment: Upon discharge, Recommend follow up care at [facility]: [phone]. Address: [address] [place] [address] for left malleolus wound.   Monitor right great toe nail bed for tissue type changes.       Diagnosis: PNA (pneumonia)  Assessment and Plan of Treatment: You received antibiotics while in the hospital  [facility] for fever, cough and stay away from crowded [place].  Follow up with your primary care  next week.

## 2020-03-26 NOTE — PROGRESS NOTE ADULT - PROBLEM SELECTOR PLAN 1
- improving hypoxia with diuresis,  possibly in setting of fluid overload and/or infection given CT findings  - RVP and COVID negative  - will c/w albuterol and ipratropium  - will resume lasix po

## 2020-03-26 NOTE — DISCHARGE NOTE PROVIDER - CARE PROVIDER_API CALL
Maryam Hoff)  Psychiatry  [address] [place] [address]  Phone: [phone]  Fax: [phone]  Follow Up Time:     Saroj Huitron)  Internal Medicine  [address] [place] [address]  Phone: [phone]  Fax: [phone]  Follow Up Time:

## 2020-03-26 NOTE — PROGRESS NOTE ADULT - PROBLEM SELECTOR PLAN 2
Patient with concern for possible heart failure given lower extremity swelling, pulmonary edema.  - TTE shows severe sys dysfunction, Cardiology consulted  - c/w ACEi, BBlocker and diuretics, outpt med titration and ischemic workup  - strict i/o  -resume diuretics

## 2020-03-26 NOTE — DISCHARGE NOTE PROVIDER - NSDCMRMEDTOKEN_GEN_ALL_CORE_FT
aspirin 81 mg oral delayed release tablet: 1 tab(s) orally once a day  atorvastatin 40 mg oral tablet: 1 tab(s) orally once a day (at bedtime)  benztropine 0.5 mg oral tablet: 1 tab(s) orally 2 times a day  divalproex sodium 250 mg oral delayed release tablet: 3 tab(s) orally 2 times a day  fluPHENAZine 5 mg oral tablet: 1 tab(s) orally 2 times a day  furosemide 20 mg oral tablet: 1 tab(s) orally once a day  lisinopril 2.5 mg oral tablet: 1 tab(s) orally once a day  metoprolol succinate 50 mg oral tablet, extended release: 1 tab(s) orally once a day  risperiDONE 1 mg oral tablet: 1 tab(s) orally 2 times a day

## 2020-03-26 NOTE — DISCHARGE NOTE NURSING/CASE MANAGEMENT/SOCIAL WORK - PATIENT PORTAL LINK FT
You can access the FollowMyHealth Patient Portal offered by [facility] by registering at the following website: [email or web address]. By joining [facility]’s FollowMyHealth portal, you will also be able to view your health information using other applications (apps) compatible with our system.

## 2020-03-30 LAB
CULTURE RESULTS: SIGNIFICANT CHANGE UP
CULTURE RESULTS: SIGNIFICANT CHANGE UP
SPECIMEN SOURCE: SIGNIFICANT CHANGE UP
SPECIMEN SOURCE: SIGNIFICANT CHANGE UP

## 2020-04-01 ENCOUNTER — OUTPATIENT (OUTPATIENT)
Dept: OUTPATIENT SERVICES | Facility: HOSPITAL | Age: 60
LOS: 1 days | End: 2020-04-01
Payer: MEDICAID

## 2020-04-22 DIAGNOSIS — Z71.89 OTHER SPECIFIED COUNSELING: ICD-10-CM

## 2020-06-17 ENCOUNTER — INPATIENT (INPATIENT)
Facility: HOSPITAL | Age: 60
LOS: 7 days | Discharge: HOME | End: 2020-06-25
Attending: STUDENT IN AN ORGANIZED HEALTH CARE EDUCATION/TRAINING PROGRAM | Admitting: STUDENT IN AN ORGANIZED HEALTH CARE EDUCATION/TRAINING PROGRAM
Payer: MEDICAID

## 2020-06-17 VITALS
RESPIRATION RATE: 14 BRPM | HEART RATE: 140 BPM | SYSTOLIC BLOOD PRESSURE: 161 MMHG | TEMPERATURE: 98 F | DIASTOLIC BLOOD PRESSURE: 107 MMHG | OXYGEN SATURATION: 95 %

## 2020-06-17 LAB
BASE EXCESS BLDV CALC-SCNC: 5.6 MMOL/L — SIGNIFICANT CHANGE UP
BASOPHILS # BLD AUTO: 0.02 K/UL — SIGNIFICANT CHANGE UP (ref 0–0.2)
BASOPHILS NFR BLD AUTO: 0.2 % — SIGNIFICANT CHANGE UP (ref 0–2)
BLOOD GAS VENOUS - CREATININE: 0.86 MG/DL — SIGNIFICANT CHANGE UP (ref 0.5–1.3)
BLOOD GAS VENOUS - FIO2: 21 — SIGNIFICANT CHANGE UP
CHLORIDE BLDV-SCNC: 102 MMOL/L — SIGNIFICANT CHANGE UP (ref 96–108)
EOSINOPHIL # BLD AUTO: 0.56 K/UL — HIGH (ref 0–0.5)
EOSINOPHIL NFR BLD AUTO: 6.2 % — HIGH (ref 0–6)
GAS PNL BLDV: 139 MMOL/L — SIGNIFICANT CHANGE UP (ref 136–146)
GLUCOSE BLDV-MCNC: 145 MG/DL — HIGH (ref 70–99)
HCO3 BLDV-SCNC: 26 MMOL/L — SIGNIFICANT CHANGE UP (ref 20–27)
HCT VFR BLD CALC: 43.1 % — SIGNIFICANT CHANGE UP (ref 39–50)
HCT VFR BLDV CALC: 41.3 % — SIGNIFICANT CHANGE UP (ref 39–51)
HGB BLD-MCNC: 12.7 G/DL — LOW (ref 13–17)
HGB BLDV-MCNC: 13.4 G/DL — SIGNIFICANT CHANGE UP (ref 13–17)
IMM GRANULOCYTES NFR BLD AUTO: 0.4 % — SIGNIFICANT CHANGE UP (ref 0–1.5)
LACTATE BLDV-MCNC: 2 MMOL/L — SIGNIFICANT CHANGE UP (ref 0.5–2)
LYMPHOCYTES # BLD AUTO: 1.93 K/UL — SIGNIFICANT CHANGE UP (ref 1–3.3)
LYMPHOCYTES # BLD AUTO: 21.4 % — SIGNIFICANT CHANGE UP (ref 13–44)
MCHC RBC-ENTMCNC: 28.3 PG — SIGNIFICANT CHANGE UP (ref 27–34)
MCHC RBC-ENTMCNC: 29.5 % — LOW (ref 32–36)
MCV RBC AUTO: 96.2 FL — SIGNIFICANT CHANGE UP (ref 80–100)
MONOCYTES # BLD AUTO: 0.73 K/UL — SIGNIFICANT CHANGE UP (ref 0–0.9)
MONOCYTES NFR BLD AUTO: 8.1 % — SIGNIFICANT CHANGE UP (ref 2–14)
NEUTROPHILS # BLD AUTO: 5.72 K/UL — SIGNIFICANT CHANGE UP (ref 1.8–7.4)
NEUTROPHILS NFR BLD AUTO: 63.7 % — SIGNIFICANT CHANGE UP (ref 43–77)
NRBC # FLD: 0 K/UL — SIGNIFICANT CHANGE UP (ref 0–0)
PCO2 BLDV: 80 MMHG — CRITICAL HIGH (ref 41–51)
PH BLDV: 7.24 PH — LOW (ref 7.32–7.43)
PLATELET # BLD AUTO: 450 K/UL — HIGH (ref 150–400)
PMV BLD: 9.1 FL — SIGNIFICANT CHANGE UP (ref 7–13)
PO2 BLDV: 25 MMHG — LOW (ref 35–40)
POTASSIUM BLDV-SCNC: 4.3 MMOL/L — SIGNIFICANT CHANGE UP (ref 3.4–4.5)
RBC # BLD: 4.48 M/UL — SIGNIFICANT CHANGE UP (ref 4.2–5.8)
RBC # FLD: 14.1 % — SIGNIFICANT CHANGE UP (ref 10.3–14.5)
SAO2 % BLDV: 32.6 % — LOW (ref 60–85)
WBC # BLD: 9 K/UL — SIGNIFICANT CHANGE UP (ref 3.8–10.5)
WBC # FLD AUTO: 9 K/UL — SIGNIFICANT CHANGE UP (ref 3.8–10.5)

## 2020-06-17 PROCEDURE — 71045 X-RAY EXAM CHEST 1 VIEW: CPT | Mod: 26

## 2020-06-17 PROCEDURE — 99291 CRITICAL CARE FIRST HOUR: CPT

## 2020-06-17 RX ORDER — IPRATROPIUM/ALBUTEROL SULFATE 18-103MCG
3 AEROSOL WITH ADAPTER (GRAM) INHALATION ONCE
Refills: 0 | Status: COMPLETED | OUTPATIENT
Start: 2020-06-17 | End: 2020-06-17

## 2020-06-17 RX ORDER — FUROSEMIDE 40 MG
60 TABLET ORAL ONCE
Refills: 0 | Status: COMPLETED | OUTPATIENT
Start: 2020-06-17 | End: 2020-06-17

## 2020-06-17 RX ORDER — MAGNESIUM SULFATE 500 MG/ML
1 VIAL (ML) INJECTION ONCE
Refills: 0 | Status: COMPLETED | OUTPATIENT
Start: 2020-06-17 | End: 2020-06-17

## 2020-06-17 RX ORDER — DEXAMETHASONE 0.5 MG/5ML
10 ELIXIR ORAL ONCE
Refills: 0 | Status: COMPLETED | OUTPATIENT
Start: 2020-06-17 | End: 2020-06-17

## 2020-06-17 RX ORDER — NITROGLYCERIN 6.5 MG
0.4 CAPSULE, EXTENDED RELEASE ORAL ONCE
Refills: 0 | Status: COMPLETED | OUTPATIENT
Start: 2020-06-17 | End: 2020-06-17

## 2020-06-17 RX ADMIN — Medication 60 MILLIGRAM(S): at 23:26

## 2020-06-17 RX ADMIN — Medication 0.4 MILLIGRAM(S): at 23:26

## 2020-06-17 NOTE — ED PROVIDER NOTE - SHIFT CHANGE DETAILS
ADRIEL:  Patient signed to Dr. Strickland pending response to treatment and disposition.  Patient with hypercapnic respiratory failure, heart failure exacerbation, and possible concurrent infection.  Attempted BIPAP but patient removing mask and non-cooperative due to altered mental status, although somewhat redirectable.  At time of signout, patient in critical condition but not requiring immediate intubation.

## 2020-06-17 NOTE — ED ADULT TRIAGE NOTE - CHIEF COMPLAINT QUOTE
[place]  c/o SOB, O2 sat 88% on R/A pt was placed on O2 by EMS  ,  pt tachycardic in triage  rash to arms and legs   PMH: Schizophrenia

## 2020-06-17 NOTE — ED PROVIDER NOTE - PROGRESS NOTE DETAILS
Romaine, pgy3: pt labs showing severe hypercapnea, in setting of known every day smoker and poor medical f/u, pt likely has significant undiagnosed obstructive lung disease, nitro gtt less likely to be effective than positive pressure, will attempt BIPAP Romaine, pgy3: pt refused to tolerate BIPAP, continually ripping mask off face and shouting obscenities, rather than intubate likely obstructive lung dx pt, will attempt conscious sedation w/ ketamine to help with bronchodilation and tolerance of BIPAP Romaine, pgy3: pt transitioned from ketamine pushes to precedex gtt, pt WOB, RR, HR, greatly improved on BIPAP . MICU aware, will recheck vbg and admit

## 2020-06-17 NOTE — ED PROVIDER NOTE - OBJECTIVE STATEMENT
60y m PMhx schizophrenia and recently diagnosed HF (dced after first presentation in March 2020) p/w SOB and hypoxia. Pt presented similarly in March when he was diagnosed w/ acute systolic HF; pt also has had worsening b/l leg swelling as his SOB has worsened. Pt is actively belly breathing and dyspneic at rest upon evaluation here. Initially hypoxic in 80s on RA , improved to high 90s on 3L NC.

## 2020-06-17 NOTE — ED PROVIDER NOTE - CARE PLAN
Principal Discharge DX:	Hypercapnic respiratory failure  Secondary Diagnosis:	Schizoaffective disorder

## 2020-06-17 NOTE — ED PROVIDER NOTE - CLINICAL SUMMARY MEDICAL DECISION MAKING FREE TEXT BOX
Romaine, pgy3: pt p/w likely acute HF given hx of HF and sx of severe SOB, hypoxia, orthopnea, LE edema. Will admin diuretics and likely start BIPAP, will assess for pna including covid, pt O2 sats improving w/ NC but WOB still significant, will continue to monitor closely for improvement vs decompensation after starting BIPAP -Romaine

## 2020-06-17 NOTE — ED PROVIDER NOTE - ATTENDING CONTRIBUTION TO CARE
I have personally performed a face to face bedside history and physical examination of this patient. I have discussed the history, examination, review of systems, assessment and plan of management with the resident. I have reviewed the electronic medical record and amended it to reflect my history, review of systems, physical exam, assessment and plan.    Brief HPI:  60y m PMhx schizophrenia and recently diagnosed HF (dced after first presentation in March 2020) p/w SOB and hypoxia.  Reportedly hypoxic to 80s in field.  Patient reports difficulty breathing after smoking a joint today.  Denies chest pain, fever, chills.  Difficulty to obtain history, patient tangential and continually asking for food but appears in moderate respiratory distress.     Vitals:   Reviewed    Exam:    GEN:  tachypneic, speaking full sentences, alert  HEENT:  NCAT, neck supple, EOMI, PERRLA, sclera anicteric, no conjunctival pallor or injection, no stridor,  no tonsillar exudate, uvula midline, tympanic membranes and external auditory canals normal appearing bilaterally   CV:  tachy, regular, s1/s2 audible, no murmurs, rubs or gallops, peripheral pulses 2+ and symmetric  PULM:  tachypneic, breath sounds shallow b/l, mild expiratory wheeze, crackles at bilateral lung bases  ABD:  non distended, non-tender, no rebound, no guarding, negative Richardson's sign, bowel sounds normal, no cvat  MSK:  desquamination of b/l feet, 2+ pitting edema b/l le, extremities warm   NEURO:  alert, moves all extremities   SKIN:  warm, dry    A/P:  60y m PMhx schizophrenia and recently diagnosed HF (dced after first presentation in March 2020) p/w SOB and hypoxia.  Tachy on arrival, tachypneic, sats mid 90s on 3L NC, afebrile.  Appears mildly volume overloaded on exam.  Possible acute decompensated heart failure exacerbation given recent history.  Also consider obstructive lung disease given wheeze, poor air movement, and smoking history.  Infectious etiology also considered, including covid-19.  Lower c/f ACS or PE at this time given other likely pathologies.  Will send labs, cxr.  Plan to give SLNG, lasix, duoneb, steroids, mag.  Will trial NIPPV.  Patient not requiring intubation at this time.  Anticipate admission.

## 2020-06-18 DIAGNOSIS — F25.0 SCHIZOAFFECTIVE DISORDER, BIPOLAR TYPE: ICD-10-CM

## 2020-06-18 DIAGNOSIS — F12.10 CANNABIS ABUSE, UNCOMPLICATED: ICD-10-CM

## 2020-06-18 DIAGNOSIS — G93.40 ENCEPHALOPATHY, UNSPECIFIED: ICD-10-CM

## 2020-06-18 DIAGNOSIS — Z91.19 PATIENT'S NONCOMPLIANCE WITH OTHER MEDICAL TREATMENT AND REGIMEN: ICD-10-CM

## 2020-06-18 DIAGNOSIS — J96.92 RESPIRATORY FAILURE, UNSPECIFIED WITH HYPERCAPNIA: ICD-10-CM

## 2020-06-18 LAB
ALBUMIN SERPL ELPH-MCNC: 2.9 G/DL — LOW (ref 3.3–5)
ALBUMIN SERPL ELPH-MCNC: 3.4 G/DL — SIGNIFICANT CHANGE UP (ref 3.3–5)
ALP SERPL-CCNC: 90 U/L — SIGNIFICANT CHANGE UP (ref 40–120)
ALP SERPL-CCNC: 95 U/L — SIGNIFICANT CHANGE UP (ref 40–120)
ALT FLD-CCNC: 41 U/L — SIGNIFICANT CHANGE UP (ref 4–41)
ALT FLD-CCNC: 51 U/L — HIGH (ref 4–41)
AMPHET UR-MCNC: NEGATIVE — SIGNIFICANT CHANGE UP
ANION GAP SERPL CALC-SCNC: 13 MMO/L — SIGNIFICANT CHANGE UP (ref 7–14)
ANION GAP SERPL CALC-SCNC: 9 MMO/L — SIGNIFICANT CHANGE UP (ref 7–14)
APAP SERPL-MCNC: < 15 UG/ML — LOW (ref 15–25)
APPEARANCE UR: CLEAR — SIGNIFICANT CHANGE UP
AST SERPL-CCNC: 34 U/L — SIGNIFICANT CHANGE UP (ref 4–40)
AST SERPL-CCNC: 57 U/L — HIGH (ref 4–40)
BACTERIA # UR AUTO: NEGATIVE — SIGNIFICANT CHANGE UP
BARBITURATES UR SCN-MCNC: NEGATIVE — SIGNIFICANT CHANGE UP
BASE EXCESS BLDV CALC-SCNC: 6.9 MMOL/L — SIGNIFICANT CHANGE UP
BENZODIAZ UR-MCNC: NEGATIVE — SIGNIFICANT CHANGE UP
BILIRUB SERPL-MCNC: 0.4 MG/DL — SIGNIFICANT CHANGE UP (ref 0.2–1.2)
BILIRUB SERPL-MCNC: 0.4 MG/DL — SIGNIFICANT CHANGE UP (ref 0.2–1.2)
BILIRUB UR-MCNC: NEGATIVE — SIGNIFICANT CHANGE UP
BLOOD GAS VENOUS - CREATININE: 0.92 MG/DL — SIGNIFICANT CHANGE UP (ref 0.5–1.3)
BLOOD GAS VENOUS - FIO2: 21 — SIGNIFICANT CHANGE UP
BLOOD UR QL VISUAL: NEGATIVE — SIGNIFICANT CHANGE UP
BUN SERPL-MCNC: 12 MG/DL — SIGNIFICANT CHANGE UP (ref 7–23)
BUN SERPL-MCNC: 14 MG/DL — SIGNIFICANT CHANGE UP (ref 7–23)
CALCIUM SERPL-MCNC: 7.4 MG/DL — LOW (ref 8.4–10.5)
CALCIUM SERPL-MCNC: 8.8 MG/DL — SIGNIFICANT CHANGE UP (ref 8.4–10.5)
CANNABINOIDS UR-MCNC: POSITIVE — SIGNIFICANT CHANGE UP
CHLORIDE BLDV-SCNC: 101 MMOL/L — SIGNIFICANT CHANGE UP (ref 96–108)
CHLORIDE SERPL-SCNC: 100 MMOL/L — SIGNIFICANT CHANGE UP (ref 98–107)
CHLORIDE SERPL-SCNC: 100 MMOL/L — SIGNIFICANT CHANGE UP (ref 98–107)
CO2 SERPL-SCNC: 28 MMOL/L — SIGNIFICANT CHANGE UP (ref 22–31)
CO2 SERPL-SCNC: 31 MMOL/L — SIGNIFICANT CHANGE UP (ref 22–31)
COCAINE METAB.OTHER UR-MCNC: NEGATIVE — SIGNIFICANT CHANGE UP
COLOR SPEC: YELLOW — SIGNIFICANT CHANGE UP
CREAT SERPL-MCNC: 0.68 MG/DL — SIGNIFICANT CHANGE UP (ref 0.5–1.3)
CREAT SERPL-MCNC: 0.88 MG/DL — SIGNIFICANT CHANGE UP (ref 0.5–1.3)
ETHANOL BLD-MCNC: < 10 MG/DL — SIGNIFICANT CHANGE UP
GAS PNL BLDV: 138 MMOL/L — SIGNIFICANT CHANGE UP (ref 136–146)
GLUCOSE BLDC GLUCOMTR-MCNC: 141 MG/DL — HIGH (ref 70–99)
GLUCOSE BLDC GLUCOMTR-MCNC: 157 MG/DL — HIGH (ref 70–99)
GLUCOSE BLDV-MCNC: 177 MG/DL — HIGH (ref 70–99)
GLUCOSE SERPL-MCNC: 140 MG/DL — HIGH (ref 70–99)
GLUCOSE SERPL-MCNC: 164 MG/DL — HIGH (ref 70–99)
GLUCOSE UR-MCNC: NEGATIVE — SIGNIFICANT CHANGE UP
HCO3 BLDV-SCNC: 29 MMOL/L — HIGH (ref 20–27)
HCT VFR BLD CALC: 40.8 % — SIGNIFICANT CHANGE UP (ref 39–50)
HCT VFR BLDV CALC: 40.5 % — SIGNIFICANT CHANGE UP (ref 39–51)
HGB BLD-MCNC: 12.8 G/DL — LOW (ref 13–17)
HGB BLDV-MCNC: 13.2 G/DL — SIGNIFICANT CHANGE UP (ref 13–17)
HYALINE CASTS # UR AUTO: NEGATIVE — SIGNIFICANT CHANGE UP
KETONES UR-MCNC: NEGATIVE — SIGNIFICANT CHANGE UP
LACTATE BLDV-MCNC: 1.9 MMOL/L — SIGNIFICANT CHANGE UP (ref 0.5–2)
LEUKOCYTE ESTERASE UR-ACNC: NEGATIVE — SIGNIFICANT CHANGE UP
MAGNESIUM SERPL-MCNC: 2.2 MG/DL — SIGNIFICANT CHANGE UP (ref 1.6–2.6)
MCHC RBC-ENTMCNC: 29.7 PG — SIGNIFICANT CHANGE UP (ref 27–34)
MCHC RBC-ENTMCNC: 31.4 % — LOW (ref 32–36)
MCV RBC AUTO: 94.7 FL — SIGNIFICANT CHANGE UP (ref 80–100)
METHADONE UR-MCNC: NEGATIVE — SIGNIFICANT CHANGE UP
NITRITE UR-MCNC: NEGATIVE — SIGNIFICANT CHANGE UP
NRBC # FLD: 0 K/UL — SIGNIFICANT CHANGE UP (ref 0–0)
NT-PROBNP SERPL-SCNC: 1491 PG/ML — SIGNIFICANT CHANGE UP
OPIATES UR-MCNC: NEGATIVE — SIGNIFICANT CHANGE UP
OXYCODONE UR-MCNC: NEGATIVE — SIGNIFICANT CHANGE UP
PCO2 BLDV: 67 MMHG — HIGH (ref 41–51)
PCP UR-MCNC: NEGATIVE — SIGNIFICANT CHANGE UP
PH BLDV: 7.32 PH — SIGNIFICANT CHANGE UP (ref 7.32–7.43)
PH UR: 6.5 — SIGNIFICANT CHANGE UP (ref 5–8)
PHOSPHATE SERPL-MCNC: 4.2 MG/DL — SIGNIFICANT CHANGE UP (ref 2.5–4.5)
PLATELET # BLD AUTO: 390 K/UL — SIGNIFICANT CHANGE UP (ref 150–400)
PMV BLD: 9.1 FL — SIGNIFICANT CHANGE UP (ref 7–13)
PO2 BLDV: 63 MMHG — HIGH (ref 35–40)
POTASSIUM BLDV-SCNC: 4.3 MMOL/L — SIGNIFICANT CHANGE UP (ref 3.4–4.5)
POTASSIUM SERPL-MCNC: 4.4 MMOL/L — SIGNIFICANT CHANGE UP (ref 3.5–5.3)
POTASSIUM SERPL-MCNC: 5 MMOL/L — SIGNIFICANT CHANGE UP (ref 3.5–5.3)
POTASSIUM SERPL-SCNC: 4.4 MMOL/L — SIGNIFICANT CHANGE UP (ref 3.5–5.3)
POTASSIUM SERPL-SCNC: 5 MMOL/L — SIGNIFICANT CHANGE UP (ref 3.5–5.3)
PROT SERPL-MCNC: 6.6 G/DL — SIGNIFICANT CHANGE UP (ref 6–8.3)
PROT SERPL-MCNC: 6.8 G/DL — SIGNIFICANT CHANGE UP (ref 6–8.3)
PROT UR-MCNC: 20 — SIGNIFICANT CHANGE UP
RBC # BLD: 4.31 M/UL — SIGNIFICANT CHANGE UP (ref 4.2–5.8)
RBC # FLD: 13.9 % — SIGNIFICANT CHANGE UP (ref 10.3–14.5)
RBC CASTS # UR COMP ASSIST: SIGNIFICANT CHANGE UP (ref 0–?)
SALICYLATES SERPL-MCNC: < 5 MG/DL — LOW (ref 15–30)
SAO2 % BLDV: 89.6 % — HIGH (ref 60–85)
SARS-COV-2 RNA SPEC QL NAA+PROBE: SIGNIFICANT CHANGE UP
SARS-COV-2 RNA SPEC QL NAA+PROBE: SIGNIFICANT CHANGE UP
SODIUM SERPL-SCNC: 140 MMOL/L — SIGNIFICANT CHANGE UP (ref 135–145)
SODIUM SERPL-SCNC: 141 MMOL/L — SIGNIFICANT CHANGE UP (ref 135–145)
SP GR SPEC: 1.02 — SIGNIFICANT CHANGE UP (ref 1–1.04)
SQUAMOUS # UR AUTO: SIGNIFICANT CHANGE UP
TROPONIN T, HIGH SENSITIVITY: 29 NG/L — SIGNIFICANT CHANGE UP (ref ?–14)
TSH SERPL-MCNC: 1.5 UIU/ML — SIGNIFICANT CHANGE UP (ref 0.27–4.2)
UROBILINOGEN FLD QL: NORMAL — SIGNIFICANT CHANGE UP
WBC # BLD: 8.14 K/UL — SIGNIFICANT CHANGE UP (ref 3.8–10.5)
WBC # FLD AUTO: 8.14 K/UL — SIGNIFICANT CHANGE UP (ref 3.8–10.5)
WBC UR QL: SIGNIFICANT CHANGE UP (ref 0–?)

## 2020-06-18 PROCEDURE — 99291 CRITICAL CARE FIRST HOUR: CPT

## 2020-06-18 PROCEDURE — 93010 ELECTROCARDIOGRAM REPORT: CPT | Mod: 76

## 2020-06-18 PROCEDURE — ZZZZZ: CPT

## 2020-06-18 PROCEDURE — 90792 PSYCH DIAG EVAL W/MED SRVCS: CPT

## 2020-06-18 RX ORDER — KETAMINE HYDROCHLORIDE 100 MG/ML
100 INJECTION INTRAMUSCULAR; INTRAVENOUS ONCE
Refills: 0 | Status: DISCONTINUED | OUTPATIENT
Start: 2020-06-18 | End: 2020-06-18

## 2020-06-18 RX ORDER — DEXTROSE 50 % IN WATER 50 %
25 SYRINGE (ML) INTRAVENOUS ONCE
Refills: 0 | Status: DISCONTINUED | OUTPATIENT
Start: 2020-06-18 | End: 2020-06-25

## 2020-06-18 RX ORDER — INSULIN LISPRO 100/ML
VIAL (ML) SUBCUTANEOUS EVERY 6 HOURS
Refills: 0 | Status: DISCONTINUED | OUTPATIENT
Start: 2020-06-18 | End: 2020-06-20

## 2020-06-18 RX ORDER — KETAMINE HYDROCHLORIDE 100 MG/ML
50 INJECTION INTRAMUSCULAR; INTRAVENOUS ONCE
Refills: 0 | Status: DISCONTINUED | OUTPATIENT
Start: 2020-06-18 | End: 2020-06-18

## 2020-06-18 RX ORDER — IPRATROPIUM/ALBUTEROL SULFATE 18-103MCG
3 AEROSOL WITH ADAPTER (GRAM) INHALATION EVERY 6 HOURS
Refills: 0 | Status: DISCONTINUED | OUTPATIENT
Start: 2020-06-18 | End: 2020-06-18

## 2020-06-18 RX ORDER — GLUCAGON INJECTION, SOLUTION 0.5 MG/.1ML
1 INJECTION, SOLUTION SUBCUTANEOUS ONCE
Refills: 0 | Status: DISCONTINUED | OUTPATIENT
Start: 2020-06-18 | End: 2020-06-25

## 2020-06-18 RX ORDER — ALBUTEROL 90 UG/1
2 AEROSOL, METERED ORAL EVERY 6 HOURS
Refills: 0 | Status: DISCONTINUED | OUTPATIENT
Start: 2020-06-18 | End: 2020-06-18

## 2020-06-18 RX ORDER — VALPROIC ACID (AS SODIUM SALT) 250 MG/5ML
750 SOLUTION, ORAL ORAL EVERY 12 HOURS
Refills: 0 | Status: DISCONTINUED | OUTPATIENT
Start: 2020-06-18 | End: 2020-06-22

## 2020-06-18 RX ORDER — DEXTROSE 50 % IN WATER 50 %
15 SYRINGE (ML) INTRAVENOUS ONCE
Refills: 0 | Status: DISCONTINUED | OUTPATIENT
Start: 2020-06-18 | End: 2020-06-25

## 2020-06-18 RX ORDER — IPRATROPIUM/ALBUTEROL SULFATE 18-103MCG
3 AEROSOL WITH ADAPTER (GRAM) INHALATION EVERY 6 HOURS
Refills: 0 | Status: DISCONTINUED | OUTPATIENT
Start: 2020-06-18 | End: 2020-06-22

## 2020-06-18 RX ORDER — SODIUM CHLORIDE 9 MG/ML
1000 INJECTION, SOLUTION INTRAVENOUS
Refills: 0 | Status: DISCONTINUED | OUTPATIENT
Start: 2020-06-18 | End: 2020-06-25

## 2020-06-18 RX ORDER — ENOXAPARIN SODIUM 100 MG/ML
40 INJECTION SUBCUTANEOUS DAILY
Refills: 0 | Status: DISCONTINUED | OUTPATIENT
Start: 2020-06-18 | End: 2020-06-25

## 2020-06-18 RX ORDER — DEXTROSE 50 % IN WATER 50 %
12.5 SYRINGE (ML) INTRAVENOUS ONCE
Refills: 0 | Status: DISCONTINUED | OUTPATIENT
Start: 2020-06-18 | End: 2020-06-25

## 2020-06-18 RX ORDER — DEXMEDETOMIDINE HYDROCHLORIDE IN 0.9% SODIUM CHLORIDE 4 UG/ML
0.5 INJECTION INTRAVENOUS
Qty: 400 | Refills: 0 | Status: DISCONTINUED | OUTPATIENT
Start: 2020-06-18 | End: 2020-06-18

## 2020-06-18 RX ORDER — CHLORHEXIDINE GLUCONATE 213 G/1000ML
1 SOLUTION TOPICAL
Refills: 0 | Status: DISCONTINUED | OUTPATIENT
Start: 2020-06-18 | End: 2020-06-25

## 2020-06-18 RX ORDER — DEXMEDETOMIDINE HYDROCHLORIDE IN 0.9% SODIUM CHLORIDE 4 UG/ML
80 INJECTION INTRAVENOUS ONCE
Refills: 0 | Status: COMPLETED | OUTPATIENT
Start: 2020-06-18 | End: 2020-06-18

## 2020-06-18 RX ADMIN — Medication 3 MILLILITER(S): at 00:10

## 2020-06-18 RX ADMIN — KETAMINE HYDROCHLORIDE 50 MILLIGRAM(S): 100 INJECTION INTRAMUSCULAR; INTRAVENOUS at 01:20

## 2020-06-18 RX ADMIN — Medication 40 MILLIGRAM(S): at 11:00

## 2020-06-18 RX ADMIN — Medication 100 GRAM(S): at 00:09

## 2020-06-18 RX ADMIN — Medication 1 GRAM(S): at 01:00

## 2020-06-18 RX ADMIN — Medication 0: at 11:53

## 2020-06-18 RX ADMIN — ALBUTEROL 2 PUFF(S): 90 AEROSOL, METERED ORAL at 15:49

## 2020-06-18 RX ADMIN — Medication 3 MILLILITER(S): at 00:23

## 2020-06-18 RX ADMIN — DEXMEDETOMIDINE HYDROCHLORIDE IN 0.9% SODIUM CHLORIDE 10 MICROGRAM(S)/KG/HR: 4 INJECTION INTRAVENOUS at 02:05

## 2020-06-18 RX ADMIN — DEXMEDETOMIDINE HYDROCHLORIDE IN 0.9% SODIUM CHLORIDE 124.8 MICROGRAM(S): 4 INJECTION INTRAVENOUS at 02:00

## 2020-06-18 RX ADMIN — Medication 3 MILLILITER(S): at 07:46

## 2020-06-18 RX ADMIN — KETAMINE HYDROCHLORIDE 50 MILLIGRAM(S): 100 INJECTION INTRAMUSCULAR; INTRAVENOUS at 01:30

## 2020-06-18 RX ADMIN — KETAMINE HYDROCHLORIDE 100 MILLIGRAM(S): 100 INJECTION INTRAMUSCULAR; INTRAVENOUS at 00:47

## 2020-06-18 RX ADMIN — Medication 28.75 MILLIGRAM(S): at 15:23

## 2020-06-18 RX ADMIN — ALBUTEROL 2 PUFF(S): 90 AEROSOL, METERED ORAL at 08:18

## 2020-06-18 RX ADMIN — CHLORHEXIDINE GLUCONATE 1 APPLICATION(S): 213 SOLUTION TOPICAL at 08:00

## 2020-06-18 RX ADMIN — Medication 28.75 MILLIGRAM(S): at 18:05

## 2020-06-18 RX ADMIN — Medication 10 MILLIGRAM(S): at 00:09

## 2020-06-18 RX ADMIN — KETAMINE HYDROCHLORIDE 50 MILLIGRAM(S): 100 INJECTION INTRAMUSCULAR; INTRAVENOUS at 01:03

## 2020-06-18 RX ADMIN — KETAMINE HYDROCHLORIDE 100 MILLIGRAM(S): 100 INJECTION INTRAMUSCULAR; INTRAVENOUS at 01:50

## 2020-06-18 NOTE — H&P ADULT - NSHPPHYSICALEXAM_GEN_ALL_CORE
ICU Vital Signs Last 24 Hrs  T(C): 36.9 (17 Jun 2020 22:12), Max: 36.9 (17 Jun 2020 22:12)  T(F): 98.5 (17 Jun 2020 22:12), Max: 98.5 (17 Jun 2020 22:12)  HR: 77 (18 Jun 2020 04:15) (77 - 144)  BP: 107/61 (18 Jun 2020 04:15) (69/48 - 190/74)  BP(mean): 78 (18 Jun 2020 04:15) (61 - 78)  ABP: --  ABP(mean): --  RR: 14 (18 Jun 2020 04:15) (13 - 35)  SpO2: 99% (18 Jun 2020 04:15) (90% - 100%)    PHYSICAL EXAM:  GENERAL: sedated, not responsive, on BiPAP   EYES: EOMI, PERRLA, conjunctiva and sclera clear  NECK: Supple  CHEST/LUNG: intermittent coarse breath sounds b/l  HEART: Tachycardic; No murmurs, rubs, or gallops  ABDOMEN: Bowel sounds present; Soft, Nontender  EXTREMITIES:  2+ pitting edema b/l LEs, dry skin    NERVOUS SYSTEM:  sedated   MSK: FROM all 4 extremities  SKIN: dry skin on LEs ICU Vital Signs Last 24 Hrs  T(C): 36.9 (17 Jun 2020 22:12), Max: 36.9 (17 Jun 2020 22:12)  T(F): 98.5 (17 Jun 2020 22:12), Max: 98.5 (17 Jun 2020 22:12)  HR: 77 (18 Jun 2020 04:15) (77 - 144)  BP: 107/61 (18 Jun 2020 04:15) (69/48 - 190/74)  BP(mean): 78 (18 Jun 2020 04:15) (61 - 78)  ABP: --  ABP(mean): --  RR: 14 (18 Jun 2020 04:15) (13 - 35)  SpO2: 99% (18 Jun 2020 04:15) (90% - 100%)    PHYSICAL EXAM:  GENERAL: sedated, not responsive, on BiPAP   EYES: EOMI, PERRLA, conjunctiva and sclera clear  NECK: Supple  CHEST/LUNG: intermittent coarse breath sounds b/l  HEART: Tachycardic; No murmurs, rubs, or gallops  ABDOMEN: Bowel sounds present; Soft, Nontender  EXTREMITIES:  2+ pitting edema b/l LEs, dry skin    NERVOUS SYSTEM: sedated   MSK: FROM all 4 extremities  SKIN: dry skin on LEs

## 2020-06-18 NOTE — H&P ADULT - ASSESSMENT
This is a 59 year old undomiciled man with history of schizoaffective disorder, HFrEF (3/2020 TTE EF24%, eccentric LVH) who presented with SOB and hypoxia. Admitted to MICU for hypercapnic respiratory failure requiring BiPAP, also started on Precedex given agitation.     #Neuro   - Currently sedated with Precedex for agitation    - Wean as tolerated     #Psych   - Hx of schizophrenia, home meds risperidone 1 mg BID, valproic acid 750 mg BID, fluphenazine 5 mg BID, and benztropine 0.5 mg BID   - Restart meds when able to tolerate PO     #Respiratory  - Hypercapnic respiratory failure requiring BiPAP   - Etiology 2/2 COVID PNA vs. CHF exacerbation (given previous admission 3/2020 for acute decompensated HF) vs. underlying obstructive disease (given smoking history)   - Monitor PCO2, 80 --> 67   - C/w BiPAP 11/5, FiO2 40, 12     #CV  - Hypotensive, 2/2 precedex vs. r/o sepsis vs. decompensated heart failure (EF 25% 3/2020)   - Continue pressor support, wean as tolerated   - Goal MAP > 65   - Hold home lisinopril 2.5 mg, lasix 20 mg PO, and metoprolol succinate 50 mg in the setting of hypotension   - Restart ASA 81 and atorvastatin 40 when able to tolerate PO   - EKG ordered, f/u     #GI   - NPO while on BiPAP   - Mild transaminitis, will continue to trend LFTs     #Renal   - No active issues     #Heme   - Hb 12.7 (b/l 12)  - Continue to trend CBC  - If downtrending, will send anemia workup     #ID  - Does meet SIRS criteria, tachypneic and tachycardic, no source    - CXR bilateral linear and patchy opacities, which have increased compared to prior 3/2020, 2/2 pulmonary edema vs. infection, also small R pleural effusion    - F/u COVID PCR  - Will send blood cultures, U/A urine culture, f/u   - Will hold off on antibiotics at this time, start if clinical status worsens or patient spikes fever     #Endo   - TSH WNL   - No active issues     #DVT PPX   - Lovenox This is a 59 year old undomiciled man with history of schizoaffective disorder, HFrEF (3/2020 TTE EF24%, eccentric LVH) who presented with SOB and hypoxia. Admitted to MICU for hypercapnic respiratory failure requiring BiPAP, also started on Precedex given agitation.     #Neuro   - Currently sedated with Precedex for agitation    - Wean as tolerated     #Psych   - Hx of schizophrenia, home meds risperidone 1 mg BID, valproic acid 750 mg BID, fluphenazine 5 mg BID, and benztropine 0.5 mg BID   - Restart meds when able to tolerate PO     #Respiratory  - Hypercapnic respiratory failure requiring BiPAP   - Etiology 2/2 COVID PNA vs. CHF exacerbation (given previous admission 3/2020 for acute decompensated HF) vs. underlying obstructive disease (given smoking history)   - Monitor PCO2, 80 --> 67   - C/w BiPAP 11/5, FiO2 40, 12    #CV  - Hypotensive SBP 80s-90s, 2/2 precedex vs. r/o sepsis vs. decompensated heart failure (EF 25% 3/2020)   - Continue pressor support, wean as tolerated   - Goal MAP > 65   - Hold home lisinopril 2.5 mg, lasix 20 mg PO, and metoprolol succinate 50 mg in the setting of hypotension   - Restart ASA 81 and atorvastatin 40 when able to tolerate PO   - EKG ordered, f/u     #GI   - NPO while on BiPAP   - Mild transaminitis, will continue to trend LFTs     #Renal   - No active issues     #Heme   - Hb 12.7 (b/l 12)  - Continue to trend CBC  - If downtrending, will send anemia workup     #ID  - Does meet SIRS criteria, tachypneic and tachycardic, no source    - CXR bilateral linear and patchy opacities, which have increased compared to prior 3/2020, 2/2 pulmonary edema vs. infection, also small R pleural effusion    - F/u COVID PCR  - Will send blood cultures, U/A urine culture, f/u   - Will hold off on antibiotics at this time, start if clinical status worsens or patient spikes fever     #Endo   - TSH WNL   - No active issues     #DVT PPX   - Lovenox This is a 59 year old undomiciled man with history of schizoaffective disorder, HFrEF (3/2020 TTE EF24%, eccentric LVH) who presented with SOB and hypoxia. Admitted to MICU for hypercapnic respiratory failure requiring BiPAP, also started on Precedex given agitation.     #Neuro   - Currently sedated with Precedex for agitation    - Wean as tolerated     #Psych   - Hx of schizophrenia, home meds risperidone 1 mg BID, valproic acid 750 mg BID, fluphenazine 5 mg BID, and benztropine 0.5 mg BID   - Restart meds when able to tolerate PO     #Respiratory  - Hypercapnic respiratory failure requiring BiPAP   - Etiology 2/2 COVID PNA vs. CHF exacerbation (given previous admission 3/2020 for acute decompensated HF) vs. underlying obstructive disease (given smoking history)   - Monitor PCO2, 80 --> 67   - BNP 1491   - C/w BiPAP 11/5, FiO2 40, 12    #CV  - Hypotensive SBP 80s-90s, 2/2 precedex vs. r/o sepsis vs. decompensated heart failure (EF 25% 3/2020)   - Continue pressor support, wean as tolerated   - Goal MAP > 65   - Hold home lisinopril 2.5 mg, lasix 20 mg PO, and metoprolol succinate 50 mg in the setting of hypotension   - Restart ASA 81 and atorvastatin 40 when able to tolerate PO   - EKG ordered, f/u     #GI   - NPO while on BiPAP   - Mild transaminitis, will continue to trend LFTs     #Renal   - No active issues     #Heme   - Hb 12.7 (b/l 12)  - Continue to trend CBC  - If downtrending, will send anemia workup     #ID  - Does meet SIRS criteria, tachypneic and tachycardic, no source    - CXR bilateral linear and patchy opacities, which have increased compared to prior 3/2020, 2/2 pulmonary edema vs. infection, also small R pleural effusion    - F/u COVID PCR  - Will send blood cultures, U/A urine culture, f/u   - Will hold off on antibiotics at this time, start if clinical status worsens or patient spikes fever     #Endo   - TSH WNL   - No active issues     #DVT PPX   - Lovenox This is a 59 year old undomiciled man with history of schizoaffective disorder, HFrEF (3/2020 TTE EF24%, eccentric LVH) who presented with SOB and hypoxia. Admitted to MICU for hypercapnic respiratory failure requiring BiPAP, also started on Precedex given agitation.     #Neuro   - Currently sedated with Precedex for agitation    - Wean as tolerated     #Psych   - Hx of schizoaffective disorder, home meds risperidone 1 mg BID, valproic acid 750 mg BID, fluphenazine 5 mg BID, and benztropine 0.5 mg BID   - Restart meds when able to tolerate PO     #Respiratory  - Hypercapnic respiratory failure requiring BiPAP   - Etiology 2/2 COVID PNA vs. CHF exacerbation (given previous admission 3/2020 for acute decompensated HF) vs. underlying obstructive disease (given smoking history)   - Monitor PCO2, 80 --> 67   - BNP 1491   - C/w BiPAP 11/5, FiO2 40, 12    #CV  - Hypotensive SBP 80s-90s, 2/2 precedex vs. r/o sepsis vs. decompensated heart failure (EF 25% 3/2020)   - Continue pressor support, wean as tolerated   - Goal MAP > 65   - Hold home lisinopril 2.5 mg, lasix 20 mg PO, and metoprolol succinate 50 mg in the setting of hypotension   - Restart ASA 81 and atorvastatin 40 when able to tolerate PO   - EKG ordered, f/u     #GI   - NPO while on BiPAP   - Mild transaminitis, will continue to trend LFTs     #Renal   - No active issues     #Heme   - Hb 12.7 (b/l 12)  - Continue to trend CBC  - If downtrending, will send anemia workup     #ID  - Does meet SIRS criteria, tachypneic and tachycardic, no source    - CXR bilateral linear and patchy opacities, which have increased compared to prior 3/2020, 2/2 pulmonary edema vs. infection, also small R pleural effusion    - F/u COVID PCR  - Will send blood cultures, U/A urine culture, f/u   - Will hold off on antibiotics at this time, start if clinical status worsens or patient spikes fever     #Endo   - TSH WNL   - No active issues     #DVT PPX   - Lovenox This is a 59 year old undomiciled man with history of schizoaffective disorder, HFrEF (3/2020 TTE EF24%, eccentric LVH) who presented with SOB and hypoxia. Admitted to MICU for hypercapnic respiratory failure requiring BiPAP, also started on Precedex given agitation.     #Neuro   - Currently sedated with Precedex for agitation    - Wean as tolerated     #Psych   - Hx of schizoaffective disorder, home meds risperidone 1 mg BID, valproic acid 750 mg BID, fluphenazine 5 mg BID, and benztropine 0.5 mg BID   - Restart meds when able to tolerate PO     #Respiratory  - Hypercapnic respiratory failure requiring BiPAP   - Etiology 2/2 COVID PNA vs. CHF exacerbation (given previous admission 3/2020 for acute decompensated HF) vs. underlying obstructive disease (given smoking history)   - Monitor PCO2, 80 --> 67   - BNP 1491   - S/p lasix 60 mg IV in the ED   - C/w BiPAP 11/5, FiO2 40, 12    #CV  - Hypotensive SBP 80s-90s, 2/2 precedex vs. r/o sepsis vs. decompensated heart failure (EF 25% 3/2020)   - Continue pressor support, wean as tolerated   - Goal MAP > 65   - Hold home lisinopril 2.5 mg, lasix 20 mg PO, and metoprolol succinate 50 mg in the setting of hypotension   - Restart ASA 81 and atorvastatin 40 when able to tolerate PO   - EKG ordered, f/u     #GI   - NPO while on BiPAP   - Mild transaminitis, will continue to trend LFTs     #Renal   - No active issues     #Heme   - Hb 12.7 (b/l 12)  - Continue to trend CBC  - If downtrending, will send anemia workup     #ID  - Does meet SIRS criteria, tachypneic and tachycardic, no source    - CXR bilateral linear and patchy opacities, which have increased compared to prior 3/2020, 2/2 pulmonary edema vs. infection, also small R pleural effusion    - F/u COVID PCR  - Will send blood cultures, U/A urine culture, f/u   - Will hold off on antibiotics at this time, start if clinical status worsens or patient spikes fever     #Endo   - TSH WNL   - No active issues     #DVT PPX   - Lovenox This is a 59 year old undomiciled man with history of schizoaffective disorder, HFrEF (3/2020 TTE EF24%, eccentric LVH) who presented with SOB and hypoxia. Admitted to MICU for hypercapnic respiratory failure requiring BiPAP, also started on Precedex given agitation.     #Neuro   - Currently sedated with Precedex for agitation    - Wean as tolerated     #Psych   - Hx of schizoaffective disorder, home meds risperidone 1 mg BID, valproic acid 750 mg BID, fluphenazine 5 mg BID, and benztropine 0.5 mg BID   - Restart meds when able to tolerate PO     #Respiratory  - Hypercapnic respiratory failure requiring BiPAP   - Etiology 2/2 COVID PNA vs. HFrEF exacerbation (given previous admission 3/2020 for acute decompensated HF, EF 24%) vs. underlying obstructive disease (given smoking history)   - Monitor PCO2, 80 --> 67   - BNP 1491   - S/p lasix 60 mg IV in the ED   - C/w BiPAP 11/5, FiO2 40, 12    #CV  - Hypotensive SBP 80s-90s, 2/2 precedex vs. r/o sepsis vs. decompensated heart failure (EF 25% 3/2020)   - Continue pressor support, wean as tolerated   - Goal MAP > 65   - Hold home lisinopril 2.5 mg, lasix 20 mg PO, and metoprolol succinate 50 mg in the setting of hypotension   - Restart ASA 81 and atorvastatin 40 when able to tolerate PO   - EKG ordered, f/u     #GI   - NPO while on BiPAP   - Mild transaminitis, will continue to trend LFTs     #Renal   - No active issues     #Heme   - Hb 12.7 (b/l 12)  - Continue to trend CBC  - If downtrending, will send anemia workup     #ID  - Does meet SIRS criteria, tachypneic and tachycardic, no source    - CXR bilateral linear and patchy opacities, which have increased compared to prior 3/2020, 2/2 pulmonary edema vs. infection, also small R pleural effusion    - F/u COVID PCR  - Will send blood cultures, U/A urine culture, f/u   - Will hold off on antibiotics at this time, start if clinical status worsens or patient spikes fever     #Endo   - TSH WNL   - No active issues     #DVT PPX   - Lovenox This is a 60 year old undomiciled man with history of schizoaffective disorder, HFrEF (3/2020 TTE EF24%, eccentric LVH) who presented with SOB and hypoxia. Admitted to MICU for hypercapnic respiratory failure requiring BiPAP, also started on Precedex given agitation.     #Neuro   - Currently sedated with Precedex for agitation    - Wean as tolerated     #Psych   - Hx of schizoaffective disorder, home meds risperidone 1 mg BID, valproic acid 750 mg BID, fluphenazine 5 mg BID, and benztropine 0.5 mg BID   - Restart meds when able to tolerate PO     #Respiratory  - Hypercapnic respiratory failure requiring BiPAP   - Etiology 2/2 COVID PNA vs. HFrEF exacerbation (given previous admission 3/2020 for acute decompensated HF, EF 24%) vs. underlying obstructive disease (given smoking history)   - Monitor PCO2, 80 --> 67   - BNP 1491   - S/p lasix 60 mg IV in the ED   - C/w BiPAP 11/5, FiO2 40, 12    #CV  - Hypotensive SBP 80s-90s, 2/2 precedex vs. r/o sepsis vs. decompensated heart failure (EF 25% 3/2020)   - Continue pressor support, wean as tolerated   - Goal MAP > 65   - Hold home lisinopril 2.5 mg, lasix 20 mg PO, and metoprolol succinate 50 mg in the setting of hypotension   - Restart ASA 81 and atorvastatin 40 when able to tolerate PO   - EKG ordered, f/u     #GI   - NPO while on BiPAP   - Mild transaminitis, will continue to trend LFTs     #Renal   - No active issues     #Heme   - Hb 12.7 (b/l 12)  - Continue to trend CBC  - If downtrending, will send anemia workup     #ID  - Does meet SIRS criteria, tachypneic and tachycardic, no source    - CXR bilateral linear and patchy opacities, which have increased compared to prior 3/2020, 2/2 pulmonary edema vs. infection, also small R pleural effusion    - F/u COVID PCR  - Will send blood cultures, U/A urine culture, f/u   - Will hold off on antibiotics at this time, start if clinical status worsens or patient spikes fever     #Endo   - TSH WNL   - No active issues     #DVT PPX   - Lovenox This is a 60 year old undomiciled man with history of schizoaffective disorder, HFrEF (3/2020 TTE EF24%, eccentric LVH) who presented with SOB and hypoxia. Admitted to MICU for hypercapnic respiratory failure requiring BiPAP, also started on Precedex given agitation.     #Neuro   - Currently sedated with Precedex for agitation    - Wean as tolerated     #Psych   - Hx of schizoaffective disorder, home meds risperidone 1 mg BID, valproic acid 750 mg BID, fluphenazine 5 mg BID, and benztropine 0.5 mg BID   - Restart meds when able to tolerate PO   - Psych consult in the am     #Respiratory  - Hypercapnic respiratory failure requiring BiPAP   - S/p lasix 60 mg IV, duonebs, and dexamethasone 10 mg IV in the ED   - Etiology 2/2 underlying obstructive disease (given smoking history) vs. COVID PNA vs. HFrEF exacerbation (given previous admission 3/2020 for acute decompensated HF, EF 24%) vs. PE (less likely)     - Monitor PCO2, 80 --> 67   - BNP 1491   - C/w BiPAP 11/5, FiO2 40, 12  - C/w steroids and duonebs for COPD exacerbation   - Consider CTA r/o PE if worsens clinically     #CV  - Hypotensive SBP 90s, 2/2 precedex vs. r/o sepsis vs. decompensated heart failure (EF 25% 3/2020)   - Hold home lisinopril 2.5 mg, lasix 20 mg PO, and metoprolol succinate 50 mg in the setting of hypotension   - Restart ASA 81 and atorvastatin 40 when able to tolerate PO   - EKG ordered, f/u   - Maintain MAP > 65     #GI   - NPO while on BiPAP   - Mild transaminitis, will continue to trend LFTs     #Renal   - No active issues     #Heme   - Hb 12.7 (b/l 12)  - Continue to trend CBC  - If downtrending, will send anemia workup     #ID  - Does meet SIRS criteria, tachypneic and tachycardic, no source    - CXR bilateral linear and patchy opacities, which have increased compared to prior 3/2020, 2/2 pulmonary edema vs. infection, also small R pleural effusion    - F/u COVID PCR  - Will send urine legionella, blood cultures, U/A, urine culture, f/u   - Will hold off on antibiotics at this time, start if clinical status worsens or patient spikes fever     #Endo   - TSH WNL   - No active issues     #DVT PPX   - Lovenox This is a 60 year old undomiciled man with history of schizoaffective disorder, HFrEF (3/2020 TTE EF24%, eccentric LVH) who presented with SOB and hypoxia. Admitted to MICU for hypercapnic respiratory failure requiring BiPAP, also started on Precedex given agitation.     #Neuro   - Agitation, pulling BiPAP off and cursing   - Now sedated with Precedex for agitation, also under constant observation     - Wean as tolerated     #Psych   - Hx of schizoaffective disorder, home meds risperidone 1 mg BID, valproic acid 750 mg BID, fluphenazine 5 mg BID, and benztropine 0.5 mg BID   - Restart meds when able to tolerate PO   - Psych consult in the am     #Respiratory  - Hypercapnic respiratory failure requiring BiPAP   - S/p lasix 60 mg IV, duonebs, and dexamethasone 10 mg IV in the ED   - Etiology 2/2 underlying obstructive disease (given smoking history) vs. COVID PNA vs. HFrEF exacerbation (given previous admission 3/2020 for acute decompensated HF, EF 24%) vs. PE (less likely)     - Monitor PCO2, 80 --> 67   - BNP 1491   - C/w BiPAP 11/5, FiO2 40, 12  - C/w steroids and duonebs for COPD exacerbation   - Consider CTA r/o PE if worsens clinically     #CV  - Hypotensive SBP 90s, 2/2 precedex vs. r/o sepsis vs. decompensated heart failure (EF 25% 3/2020)   - Hold home lisinopril 2.5 mg, lasix 20 mg PO, and metoprolol succinate 50 mg in the setting of hypotension   - Restart ASA 81 and atorvastatin 40 when able to tolerate PO   - EKG ordered, f/u   - Maintain MAP > 65     #GI   - NPO while on BiPAP   - Mild transaminitis, will continue to trend LFTs     #Renal   - No active issues     #Heme   - Hb 12.7 (b/l 12)  - Continue to trend CBC  - If downtrending, will send anemia workup     #ID  - Does meet SIRS criteria, tachypneic and tachycardic, no source    - CXR bilateral linear and patchy opacities, which have increased compared to prior 3/2020, 2/2 pulmonary edema vs. infection, also small R pleural effusion    - F/u COVID PCR  - Will send urine legionella, blood cultures, U/A, urine culture, f/u   - Will hold off on antibiotics at this time, start if clinical status worsens or patient spikes fever     #Endo   - TSH WNL   - No active issues     #DVT PPX   - Lovenox This is a 60 year old undomiciled man with history of schizoaffective disorder, HFrEF (3/2020 TTE EF24%, eccentric LVH) who presented with SOB and hypoxia. Admitted to MICU for hypercapnic respiratory failure requiring BiPAP, also started on Precedex given agitation.     #Neuro   - Agitation, pulling BiPAP off and cursing   - Now sedated with Precedex for agitation, also under constant observation     - Wean as tolerated     #Psych   - Hx of schizoaffective disorder, home meds risperidone 1 mg BID, valproic acid 750 mg BID, fluphenazine 5 mg BID, and benztropine 0.5 mg BID   - Restart meds when able to tolerate PO   - Psych consult in the am     #Respiratory  - Hypercapnic respiratory failure requiring BiPAP   - S/p lasix 60 mg IV, duonebs, and dexamethasone 10 mg IV in the ED   - Etiology 2/2 underlying obstructive disease (given smoking history) vs. COVID PNA vs. HFrEF exacerbation (given previous admission 3/2020 for acute decompensated HF, EF 24%) vs. PE (less likely)     - Monitor PCO2, 80 --> 67   - BNP 1491   - C/w BiPAP 11/5, FiO2 40, 12  - C/w steroids and duonebs for COPD exacerbation   - Consider CTA r/o PE if worsens clinically     #CV  - Hypotensive SBP 90s, 2/2 precedex vs. r/o sepsis vs. decompensated heart failure (EF 25% 3/2020)   - Hold home lisinopril 2.5 mg, lasix 20 mg PO, and metoprolol succinate 50 mg in the setting of hypotension   - Restart ASA 81 and atorvastatin 40 when able to tolerate PO   - EKG ordered, f/u   - Maintain MAP > 65   - Lasix PRN for fluid overload if BP tolerates     #GI   - NPO while on BiPAP   - Mild transaminitis, will continue to trend LFTs     #Renal   - No active issues     #Heme   - Hb 12.7 (b/l 12)  - Continue to trend CBC  - If downtrending, will send anemia workup     #ID  - Does meet SIRS criteria, tachypneic and tachycardic, no source    - CXR bilateral linear and patchy opacities, which have increased compared to prior 3/2020, 2/2 pulmonary edema vs. infection, also small R pleural effusion    - F/u COVID PCR  - Will send urine legionella, blood cultures, U/A, urine culture, f/u   - Will hold off on antibiotics at this time, start if clinical status worsens or patient spikes fever     #Endo   - TSH WNL   - A1c 6.5 3/2020, will repeat A1c and do ISS     #DVT PPX   - Lovenox This is a 60 year old undomiciled man with history of schizoaffective disorder, HFrEF (3/2020 TTE EF24%, eccentric LVH) who presented with SOB and hypoxia. Admitted to MICU for hypercapnic respiratory failure requiring BiPAP, also started on Precedex given agitation.     #Neuro   - Agitation, pulling BiPAP off and cursing   - Now sedated with Precedex for agitation, also under constant observation     - Wean as tolerated     #Psych   - Hx of schizoaffective disorder, home meds risperidone 1 mg BID, valproic acid 750 mg BID, fluphenazine 5 mg BID, and benztropine 0.5 mg BID   - Restart meds when able to tolerate PO   - Psych consult in the am     #Respiratory  - Hypercapnic respiratory failure requiring BiPAP   - S/p lasix 60 mg IV, duonebs, and dexamethasone 10 mg IV in the ED   - Etiology 2/2 underlying obstructive disease (given smoking history) vs. COVID PNA vs. HFrEF exacerbation (given previous admission 3/2020 for acute decompensated HF, EF 24%) vs. PE (less likely)     - Monitor PCO2, 80 --> 67   - BNP 1491   - C/w BiPAP 11/5, FiO2 40, 12  - C/w steroids and duonebs for COPD exacerbation   - Consider CTA r/o PE if worsens clinically     #CV  - Hypotensive SBP 90s, 2/2 precedex vs. r/o sepsis vs. decompensated heart failure (EF 25% 3/2020)   - Hold home lisinopril 2.5 mg, lasix 20 mg PO, and metoprolol succinate 50 mg in the setting of hypotension   - Restart ASA 81 and atorvastatin 40 when able to tolerate PO   - EKG ordered, f/u   - Maintain MAP > 65   - Lasix PRN for fluid overload if BP tolerates     #GI   - NPO while on BiPAP   - Mild transaminitis, will continue to trend LFTs     #Renal   - No active issues     #Heme   - Hb 12.7 (b/l 12)  - Continue to trend CBC  - If downtrending, will send anemia workup     #ID  - Does meet SIRS criteria, tachypneic and tachycardic, no source    - CXR bilateral linear and patchy opacities, which have increased compared to prior 3/2020, 2/2 pulmonary edema vs. infection, also small R pleural effusion    - F/u COVID PCR  - Will send urine legionella, blood cultures, U/A, urine culture, f/u   - Will hold off on antibiotics at this time, start if clinical status worsens or patient spikes fever     #Endo   - TSH WNL   - A1c 6.5 3/2020, will repeat A1c and do ISS     #DVT PPX   - Lovenox     #Med Rec  - Home medications as per discharge summary 3/2020, will need to confirm with pharmacy in am, as well as with patient once off sedation This is a 60 year old undomiciled man with history of schizoaffective disorder, HFrEF (3/2020 TTE EF24%, eccentric LVH) who presented with SOB and hypoxia. Admitted to MICU for hypercapnic respiratory failure requiring BiPAP, also started on Precedex given agitation.     #Neuro   - Agitation, pulling BiPAP off and cursing   - Now sedated with Precedex for agitation  - Wean as tolerated     #Psych   - Hx of schizoaffective disorder, home meds risperidone 1 mg BID, valproic acid 750 mg BID, fluphenazine 5 mg BID, and benztropine 0.5 mg BID   - Restart meds when able to tolerate PO   - Psych consult in the am     #Respiratory  - Hypercapnic respiratory failure requiring BiPAP   - S/p lasix 60 mg IV, duonebs, and dexamethasone 10 mg IV in the ED   - Etiology 2/2 underlying obstructive disease (given smoking history) vs. COVID PNA vs. HFrEF exacerbation (given previous admission 3/2020 for acute decompensated HF, EF 24%) vs. PE (less likely)     - Monitor PCO2, 80 --> 67   - BNP 1491   - C/w BiPAP 11/5, FiO2 40, 12  - C/w steroids and duonebs for COPD exacerbation   - Consider CTA r/o PE if worsens clinically     #CV  - Hypotensive SBP 90s, 2/2 precedex vs. r/o sepsis vs. decompensated heart failure (EF 25% 3/2020)   - Hold home lisinopril 2.5 mg, lasix 20 mg PO, and metoprolol succinate 50 mg in the setting of hypotension   - Restart ASA 81 and atorvastatin 40 when able to tolerate PO   - EKG ordered, f/u   - Maintain MAP > 65   - Lasix PRN for fluid overload if BP tolerates     #GI   - NPO while on BiPAP   - Mild transaminitis, will continue to trend LFTs     #Renal   - No active issues     #Heme   - Hb 12.7 (b/l 12)  - Continue to trend CBC  - If downtrending, will send anemia workup     #ID  - Does meet SIRS criteria, tachypneic and tachycardic, no source    - CXR bilateral linear and patchy opacities, which have increased compared to prior 3/2020, 2/2 pulmonary edema vs. infection, also small R pleural effusion    - F/u COVID PCR  - Will send urine legionella, blood cultures, U/A, urine culture, f/u   - Will hold off on antibiotics at this time, start if clinical status worsens or patient spikes fever     #Endo   - TSH WNL   - A1c 6.5 3/2020, will repeat A1c and do ISS     #DVT PPX   - Lovenox     #Med Rec  - Home medications as per discharge summary 3/2020, will need to confirm with pharmacy in am, as well as with patient once off sedation

## 2020-06-18 NOTE — CHART NOTE - NSCHARTNOTEFT_GEN_A_CORE
MICU Transfer Note    Transfer from: MICU    Transfer to: (  ) Medicine    (  ) Telemetry     (   ) RCU        (    ) Palliative         (   ) Stroke Unit          (   ) __________________    Accepting Physician:  Signout given to:     HPI/MICU COURSE:  This is a 60-year-old undomiciled man with history of schizoaffective disorder, HFrEF (3/2020 TTE EF24%, eccentric LVH) who presents with SOB and hypoxia. Pt was admitted 3/2020 for similar symptoms when he was diagnosed with HFrEF. At that time, patient treated for acute decompensated HFrEF. Started on lasix, ASA, statin, BB, and ACE. Pt then found to have multinodular opacities on CT, negative COVID, also completed course of antibiotics for PNA.     In the ED, T 98.5 -140 /107 hypoxic to 80s on RA, POX 97% on BiPAP 11/5 FiO2 40%     Pt treated with lasix 60 mg IV, nitroglycerin 0.4 mg sublingual, and duonebs. VBG PCO2 80, placed on BiPAP. Refused to tolerate, continually ripping mask off and shouting obscenities. Rather than intubate, attempted conscious sedation with ketamine to help with bronchodilation and tolerance of BiPAP. Then transitioned to precedex gtt.     In the MICU, patient's precedex was continued. Psychiatry was consulted and recommended valproic acid 750 BID IV, in anticipation of weaning the precedex. Patient was less agitated and weaned off precedex. Once patient's mental status improved, his feeds were restarted. EKG showed 522 and repeat was 555 later in the afternoon. Patient was given PRN Lasix dose for his HF. Solumedrol was continued for COPD exacerbation.     ASSESSMENT & PLAN:   his is a 60 year old undomiciled man with history of schizoaffective disorder, HFrEF (3/2020 TTE EF24%, eccentric LVH) who presented with SOB and hypoxia. Admitted to MICU for hypercapnic respiratory failure requiring BiPAP, also started on Precedex given agitation.     #Psych   - Hx of schizoaffective disorder, home meds risperidone 1 mg BID, valproic acid 750 mg BID, fluphenazine 5 mg BID, and benztropine 0.5 mg BID   - Restart meds when able to tolerate PO   - Psych recs appreciated: depakote  BID  - qtc this AM was 522, repeat was 555  - psych recs appreciated: ativan PRN for agitation, unless qtc<500. once QTC also improved, also restart 0.5mg BID.     #Respiratory  Hypercapnic respiratory failure requiring BiPAP   - S/p lasix 60 mg IV, duonebs, and dexamethasone 10 mg IV in the ED   - Etiology 2/2 underlying obstructive disease (given smoking history) vs. COVID PNA vs. HFrEF exacerbation (given previous admission 3/2020 for acute decompensated HF, EF 24%) vs. PE (less likely)     - Monitor PCO2, 80 --> 67   - BNP 1491   - C/w steroids  - Consider CTA r/o PE if worsens clinically   - monitor off abx  Nodularity on CT scan  - given hx of smoking and past CT scan shows nodules, there is a concern for malignancy. patient will need a repeat  CT scan once acute issues resolve.     #CV  - Hypotensive SBP 90s, 2/2 precedex vs. r/o sepsis vs. decompensated heart failure, although does not look to be in decompensated HF (EF 25% 3/2020)   - Hold home lisinopril 2.5 mg, lasix 20 mg PO, and metoprolol succinate 50 mg in the setting of hypotension   - Restart ASA 81 and atorvastatin 40 when able to tolerate PO   - Maintain MAP > 65   - Lasix PRN for fluid overload if BP tolerates  - restart oral meds once sedation is off    #GI   - NPO while on BiPAP   - now NPO because of sedation, will get bedside swallow when more cooperative    #ID  - Does meet SIRS criteria, tachypneic and tachycardic, no source    - CXR bilateral linear and patchy opacities, which have increased compared to prior 3/2020, 2/2 pulmonary edema vs. infection, also small R pleural effusion    - COVID-19 Negative x1, will get repeat   - Will send urine legionella, blood cultures, U/A, urine culture, (thus far negative)  - will also check HIV, syphillis, and QuantiFeron gold given undomiciled status   - Will hold off on antibiotics at this time, start if clinical status worsens or patient spikes fever     #Endo   - ISS    FOR FOLLOW UP:  [ ] ativan 1mg PRN for agitation.  [ ] f/u psych recs  [ ] once QTC <500, can switch to haldol PRN. can also restart 0.5mg BID.   [ ] f/u urine legionella, blood cultures, U/A, urine culture  [ ] f/u HIV, syphillis, and QuantiFeron gold given undomiciled status   [ ] Will hold off on antibiotics at this time, start if clinical status worsens or patient spikes fever   [ ] f/u COVID repeat swab  [ ] of mental status improves, bedside swallow  [ ] restart oral home BP and HF meds as BP tolerates MICU Transfer Note    Transfer from: MICU    Transfer to: (  ) Medicine    (x  ) Telemetry     (   ) RCU        (    ) Palliative         (   ) Stroke Unit          (   ) __________________    Accepting Physician:  Signout given to: Dr. Austin    [facility]/MICU COURSE:  This is a 60-year-old undomiciled man with history of schizoaffective disorder, HFrEF (3/2020 TTE EF24%, eccentric LVH) who presents with SOB and hypoxia. Pt was admitted 3/2020 for similar symptoms when he was diagnosed with HFrEF. At that time, patient treated for acute decompensated HFrEF. Started on lasix, ASA, statin, BB, and ACE. Pt then found to have multinodular opacities on CT, negative COVID, also completed course of antibiotics for PNA.     In the ED, T 98.5 -140 /107 hypoxic to 80s on RA, POX 97% on BiPAP 11/5 FiO2 40%     Pt treated with lasix 60 mg IV, nitroglycerin 0.4 mg sublingual, and duonebs. VBG PCO2 80, placed on BiPAP. Refused to tolerate, continually ripping mask off and shouting obscenities. Rather than intubate, attempted conscious sedation with ketamine to help with bronchodilation and tolerance of BiPAP. Then transitioned to precedex gtt.     In the MICU, patient's precedex was continued. Psychiatry was consulted and recommended valproic acid 750 BID IV, in anticipation of weaning the precedex. Patient was less agitated and weaned off precedex. Once patient's mental status improved, his feeds were restarted. EKG showed 522 and repeat was 555 later in the afternoon. Patient was given PRN Lasix dose for his HF. Solumedrol was continued for COPD exacerbation.     ASSESSMENT & PLAN:   his is a 60 year old undomiciled man with history of schizoaffective disorder, HFrEF (3/2020 TTE EF24%, eccentric LVH) who presented with SOB and hypoxia. Admitted to MICU for hypercapnic respiratory failure requiring BiPAP, also started on Precedex given agitation.     #Psych   - Hx of schizoaffective disorder, home meds risperidone 1 mg BID, valproic acid 750 mg BID, fluphenazine 5 mg BID, and benztropine 0.5 mg BID   - Restart meds when able to tolerate PO   - Psych recs appreciated: depakote  BID  - qtc this AM was 522, repeat was 555  - psych recs appreciated: ativan PRN for agitation, unless qtc<500. once QTC also improved, also restart 0.5mg BID.     #Respiratory  Hypercapnic respiratory failure requiring BiPAP   - S/p lasix 60 mg IV, duonebs, and dexamethasone 10 mg IV in the ED   - Etiology 2/2 underlying obstructive disease (given smoking history) vs. COVID PNA vs. HFrEF exacerbation (given previous admission 3/2020 for acute decompensated HF, EF 24%) vs. PE (less likely)     - Monitor PCO2, 80 --> 67   - BNP 1491   - C/w steroids  - Consider CTA r/o PE if worsens clinically   - monitor off abx  Nodularity on CT scan  - given hx of smoking and past CT scan shows nodules, there is a concern for malignancy. patient will need a repeat  CT scan once acute issues resolve.     #CV  - Hypotensive SBP 90s, 2/2 precedex vs. r/o sepsis vs. decompensated heart failure, although does not look to be in decompensated HF (EF 25% 3/2020)   - Hold home lisinopril 2.5 mg, lasix 20 mg PO, and metoprolol succinate 50 mg in the setting of hypotension   - Restart ASA 81 and atorvastatin 40 when able to tolerate PO   - Maintain MAP > 65   - Lasix PRN for fluid overload if BP tolerates  - restart oral meds once sedation is off    #GI   - NPO while on BiPAP   - now NPO because of sedation, will get bedside swallow when more cooperative    #ID  - Does meet SIRS criteria, tachypneic and tachycardic, no source    - CXR bilateral linear and patchy opacities, which have increased compared to prior 3/2020, 2/2 pulmonary edema vs. infection, also small R pleural effusion    - COVID-19 Negative x1, will get repeat   - Will send urine legionella, blood cultures, U/A, urine culture, (thus far negative)  - will also check HIV, syphillis, and QuantiFeron gold given undomiciled status   - Will hold off on antibiotics at this time, start if clinical status worsens or patient spikes fever     #Endo   - ISS    FOR FOLLOW UP:  [ ] ativan 1mg PRN for agitation.  [ ] f/u psych recs  [ ] once QTC <500, can switch to haldol PRN. can also restart 0.5mg BID.   [ ] f/u urine legionella, blood cultures, U/A, urine culture  [ ] f/u HIV, syphillis, and QuantiFeron gold given undomiciled status   [ ] Will hold off on antibiotics at this time, start if clinical status worsens or patient spikes fever   [ ] f/u COVID repeat swab  [ ] of mental status improves, bedside swallow  [ ] restart oral home BP and HF meds as BP tolerates MICU Transfer Note    Transfer from: MICU    Transfer to: (  ) Medicine    (x  ) Telemetry     (   ) RCU        (    ) Palliative         (   ) Stroke Unit          (   ) __________________    Accepting Physician:  Signout given to: Dr. Austin    [facility]/MICU COURSE:  This is a 60-year-old undomiciled man with history of schizoaffective disorder, HFrEF (3/2020 TTE EF24%, eccentric LVH) who presents with SOB and hypoxia. Pt was admitted 3/2020 for similar symptoms when he was diagnosed with HFrEF. At that time, patient treated for acute decompensated HFrEF. Started on lasix, ASA, statin, BB, and ACE. Pt then found to have multinodular opacities on CT, negative COVID, also completed course of antibiotics for PNA.     In the ED, T 98.5 -140 /107 hypoxic to 80s on RA, POX 97% on BiPAP 11/5 FiO2 40%     Pt treated with lasix 60 mg IV, nitroglycerin 0.4 mg sublingual, and duonebs. VBG PCO2 80, placed on BiPAP. Refused to tolerate, continually ripping mask off and shouting obscenities. Rather than intubate, attempted conscious sedation with ketamine to help with bronchodilation and tolerance of BiPAP. Then transitioned to precedex gtt.     In the MICU, patient's precedex was continued. Psychiatry was consulted and recommended valproic acid 750 BID IV, in anticipation of weaning the precedex. Patient was less agitated and weaned off precedex. Once patient's mental status improved, his feeds were restarted. EKG showed 522 and repeat was 555 later in the afternoon. Patient was given PRN Lasix dose for his HF. Solumedrol was continued for COPD exacerbation.     ASSESSMENT & PLAN:   his is a 60 year old undomiciled man with history of schizoaffective disorder, HFrEF (3/2020 TTE EF24%, eccentric LVH) who presented with SOB and hypoxia. Admitted to MICU for hypercapnic respiratory failure requiring BiPAP, also started on Precedex given agitation.     #Psych   - Hx of schizoaffective disorder, home meds risperidone 1 mg BID, valproic acid 750 mg BID, fluphenazine 5 mg BID, and benztropine 0.5 mg BID   - Restart meds when able to tolerate PO   - Psych recs appreciated: depakote  BID  - qtc this AM was 522, repeat was 555  - psych recs appreciated: ativan PRN for agitation, unless qtc<500. once QTC also improved, also restart 0.5mg BID.     #Respiratory  Hypercapnic respiratory failure requiring BiPAP   - S/p lasix 60 mg IV, duonebs, and dexamethasone 10 mg IV in the ED   - Etiology 2/2 underlying obstructive disease (given smoking history) vs. COVID PNA vs. HFrEF exacerbation (given previous admission 3/2020 for acute decompensated HF, EF 24%) vs. PE (less likely)     - Monitor PCO2, 80 --> 67   - BNP 1491   - C/w steroids  - Consider CTA r/o PE if worsens clinically   - monitor off abx  Nodularity on CT scan  - given hx of smoking and past CT scan shows nodules, there is a concern for malignancy. patient will need a repeat  CT scan once acute issues resolve.     #CV  - Hypotensive SBP 90s, 2/2 precedex vs. r/o sepsis vs. decompensated heart failure, although does not look to be in decompensated HF (EF 25% 3/2020)   - Hold home lisinopril 2.5 mg, lasix 20 mg PO, and metoprolol succinate 50 mg in the setting of hypotension   - Restart ASA 81 and atorvastatin 40 when able to tolerate PO   - Maintain MAP > 65   - Lasix PRN for fluid overload if BP tolerates  - restart oral meds once sedation is off    #GI   - NPO while on BiPAP   - now NPO because of sedation, will get bedside swallow when more cooperative    #ID  - Does meet SIRS criteria, tachypneic and tachycardic, no source    - CXR bilateral linear and patchy opacities, which have increased compared to prior 3/2020, 2/2 pulmonary edema vs. infection, also small R pleural effusion    - COVID-19 Negative x1, will get repeat   - Will send urine legionella, blood cultures, U/A, urine culture, (thus far negative)  - will also check HIV, syphillis, and QuantiFeron gold given undomiciled status   - Will hold off on antibiotics at this time, start if clinical status worsens or patient spikes fever     #Endo   - ISS    FOR FOLLOW UP:  [ ] bipap qhs and prn  [ ] ativan 1mg PRN for agitation  [ ] f/u psych recs  [ ] once QTC <500, can switch to haldol PRN. can also restart 0.5mg BID.   [ ] f/u urine legionella, blood cultures, U/A, urine culture  [ ] f/u HIV, syphillis, and QuantiFeron gold given undomiciled status   [ ] Will hold off on antibiotics at this time, start if clinical status worsens or patient spikes fever   [ ] f/u COVID repeat swab  [ ] of mental status improves, bedside swallow  [ ] restart oral home BP and HF meds as BP tolerates

## 2020-06-18 NOTE — BEHAVIORAL HEALTH ASSESSMENT NOTE - HPI (INCLUDE ILLNESS QUALITY, SEVERITY, DURATION, TIMING, CONTEXT, MODIFYING FACTORS, ASSOCIATED SIGNS AND SYMPTOMS)
The patient is a 60 y/o single, unsure about living situation (used to live with brother earlier this year), on SSI, -American male with a h/o schizoaffective d/o, multiple prior [facility] admissions (Last documented one in 2018),  with h/o one SA via OD per prior assessment, is in treatment at [facility] with Dr Bolaños (last f/u was in March 2020), unknown legal hx/violence, has a substance abuse h/o cocaine, crack and marijuana use, [place] was found to be short of breath. In the ED, he was hypoxic, tachycardic. Pt treated with lasix 60 mg IV, nitroglycerin 0.4 mg sublingual, and duonebs. VBG PCO2 80, placed on BiPAP. Refused to tolerate, continually ripping mask off and shouting obscenities. Rather than intubate, attempted conscious sedation with ketamine to help with bronchodilation and tolerance of BiPAP. Then transitioned to precedex gtt. Worsening agitation/aggression when precedex dose is lowered or switched off. Monitored in the CTI. Psychiatry has been consulted for agitation management.     Met with the patient. Calm, but irritable, belligerent, nonsensical- ' aids is not the same as coronavirus'. No logical conversation. Checked UE- no rigidity or tremors.    Discussed with RN- discussed the agitation/aggression. He is noted to be religiously preoccupied + paranoid in his statements prior, belligerent, cursing at RN.    Reviewed records from [facility] 3/2020- patient at baseline has suspicions and paranoia. He was at his baseline on Depakote 750mg BID PO, Risperidone 1mg BID PO. He has refused Prolixin JONES.    Attempted to contact family at [phone], [phone]. Brother Dayana [phone] does state that he has not seen patient for 5-6 months. He has been homeless. He would like to be updated on medical issues, but otherwise cannot help patient regarding housing, etc.

## 2020-06-18 NOTE — H&P ADULT - ATTENDING COMMENTS
hypercapnic resp failure in the setting of COPD and CHF complicated by Schizoaffective disorder with severe agitation requring ketamine and precedex gtt  bipap as tolerated. serial blood gas  steroids, nebs, chest PT  legionalla antigen   psych eval  possible 1:1 observation

## 2020-06-18 NOTE — ED ADULT NURSE REASSESSMENT NOTE - NS ED NURSE REASSESS COMMENT FT1
pt woke up, became acutely agitated ,pulling at black. pt able to be verbally redirected. pt cooperating with care at this time with reinforcement. drip titrated to 0.25mg/kg.h. will continue to monitor.

## 2020-06-18 NOTE — PROGRESS NOTE ADULT - ATTENDING COMMENTS
Seen on rounds w housestaff; hx, labs, rads, meds reviewed, plans outlined above. Improved w BiPAP+ diuresis after hypercapnic ARF overnight - suspect AECHF + AECOPD. Covid neg X 1; has altered MS c/w hx schizophrenia - Psychiatry following w recs. CXR c/w hyperinflation and patchy nod/interstitial infs. CT in Mar 2020 w bronchiectasis, tree-in-bud micronods, ATTILA and calcified lung nods. prominent right hilar ATTILA noted. CT should be repeated to document stability w Pulm Con f/u in case bronch/EBUS needed. If produces sputum would send for AFB (r/o NTM) - rec checking IGRA for latent TB (low suspicion for active TB based on CT); toxic screen pos for canniboids. Anticipate could transfer to inpt service today if neg covid confirmed X 2.. Rec case manger to assist w outpt disposition (reportedly homeless) and psychiatric follow up. [place] out today - scan bladder if no void 6 hr. Full code status. I do not feel pt has decision making capacity - needs eval for surrogate? by , Social Work, Psychiatry?

## 2020-06-18 NOTE — PROGRESS NOTE ADULT - ASSESSMENT
This is a 60 year old undomiciled man with history of schizoaffective disorder, HFrEF (3/2020 TTE EF24%, eccentric LVH) who presented with SOB and hypoxia. Admitted to MICU for hypercapnic respiratory failure requiring BiPAP, also started on Precedex given agitation.     #Neuro   - Agitation, pulling BiPAP off and cursing   - Now sedated with Precedex for agitation  - Wean as tolerated     #Psych   - Hx of schizoaffective disorder, home meds risperidone 1 mg BID, valproic acid 750 mg BID, fluphenazine 5 mg BID, and benztropine 0.5 mg BID   - Restart meds when able to tolerate PO   - Psych recs appreciated: depakote  BID  - qtc this AM was 522, will get repeat, if <500, will do risperidone 0.5mg BID    #Respiratory  Hypercapnic respiratory failure requiring BiPAP   - S/p lasix 60 mg IV, duonebs, and dexamethasone 10 mg IV in the ED   - Etiology 2/2 underlying obstructive disease (given smoking history) vs. COVID PNA vs. HFrEF exacerbation (given previous admission 3/2020 for acute decompensated HF, EF 24%) vs. PE (less likely)     - Monitor PCO2, 80 --> 67   - BNP 1491   - C/w BiPAP 11/5, FiO2 40, 12  - C/w steroids and duonebs for COPD exacerbation   - Consider CTA r/o PE if worsens clinically     #CV  - Hypotensive SBP 90s, 2/2 precedex vs. r/o sepsis vs. decompensated heart failure (EF 25% 3/2020)   - Hold home lisinopril 2.5 mg, lasix 20 mg PO, and metoprolol succinate 50 mg in the setting of hypotension   - Restart ASA 81 and atorvastatin 40 when able to tolerate PO   - EKG ordered, f/u   - Maintain MAP > 65   - Lasix PRN for fluid overload if BP tolerates     #GI   - NPO while on BiPAP   - Mild transaminitis, will continue to trend LFTs     #Renal   - No active issues     #Heme   - Hb 12.7 (b/l 12)  - Continue to trend CBC  - If downtrending, will send anemia workup     #ID  - Does meet SIRS criteria, tachypneic and tachycardic, no source    - CXR bilateral linear and patchy opacities, which have increased compared to prior 3/2020, 2/2 pulmonary edema vs. infection, also small R pleural effusion    - F/u COVID PCR  - Will send urine legionella, blood cultures, U/A, urine culture, f/u   - Will hold off on antibiotics at this time, start if clinical status worsens or patient spikes fever     #Endo   - TSH WNL   - A1c 6.5 3/2020, will repeat A1c and do ISS     #DVT PPX   - Lovenox     #Med Rec  - Home medications as per discharge summary 3/2020, will need to confirm with pharmacy in am, as well as with patient once off sedation This is a 60 year old undomiciled man with history of schizoaffective disorder, HFrEF (3/2020 TTE EF24%, eccentric LVH) who presented with SOB and hypoxia. Admitted to MICU for hypercapnic respiratory failure requiring BiPAP, also started on Precedex given agitation.     #Neuro   - Agitation, pulling BiPAP off and cursing   - Now sedated with Precedex for agitation  - Wean as tolerated     #Psych   - Hx of schizoaffective disorder, home meds risperidone 1 mg BID, valproic acid 750 mg BID, fluphenazine 5 mg BID, and benztropine 0.5 mg BID   - Restart meds when able to tolerate PO   - Psych recs appreciated: depakote  BID  - qtc this AM was 522, will get repeat, if <500, will do risperidone 0.5mg BID    #Respiratory  Hypercapnic respiratory failure requiring BiPAP   - S/p lasix 60 mg IV, duonebs, and dexamethasone 10 mg IV in the ED   - Etiology 2/2 underlying obstructive disease (given smoking history) vs. COVID PNA vs. HFrEF exacerbation (given previous admission 3/2020 for acute decompensated HF, EF 24%) vs. PE (less likely)     - Monitor PCO2, 80 --> 67   - BNP 1491   - C/w BiPAP 11/5, FiO2 40, 12  - C/w steroids and duonebs for COPD exacerbation   - Consider CTA r/o PE if worsens clinically   - monitor off abx  Nodularity on CT scan  - given hx of smoking and past CT scan shows nodules, there is a concern for malignancy. patient will need a repeat  CT scan once acute issues resolve.     #CV  - Hypotensive SBP 90s, 2/2 precedex vs. r/o sepsis vs. decompensated heart failure, although does not look to be in decompensated HF (EF 25% 3/2020)   - Hold home lisinopril 2.5 mg, lasix 20 mg PO, and metoprolol succinate 50 mg in the setting of hypotension   - Restart ASA 81 and atorvastatin 40 when able to tolerate PO   - EKG showed , will get repeat later  - Maintain MAP > 65   - Lasix PRN for fluid overload if BP tolerates     #GI   - NPO while on BiPAP   - now NPO because of sedation, will get bedside swallow when more cooperative    #Renal   - No active issues     #Heme   - Hb 12.7 (b/l 12)  - Continue to trend CBC  - If downtrending, will send anemia workup     #ID  - Does meet SIRS criteria, tachypneic and tachycardic, no source    - CXR bilateral linear and patchy opacities, which have increased compared to prior 3/2020, 2/2 pulmonary edema vs. infection, also small R pleural effusion    - COVID-19 Negative x1, will get repeat   - Will send urine legionella, blood cultures, U/A, urine culture, (thus far negative)  - will also check HIV, syphillis, and QuantiFeron gold given undomiciled status   - Will hold off on antibiotics at this time, start if clinical status worsens or patient spikes fever     #Endo   - TSH WNL   - A1c 6.5 3/2020, will repeat A1c and do ISS     #DVT PPX   - Lovenox     #Med Rec  - Home medications as per discharge summary 3/2020, will need to confirm with pharmacy, as well as with patient once off sedation

## 2020-06-18 NOTE — H&P ADULT - NSICDXPASTMEDICALHX_GEN_ALL_CORE_FT
PAST MEDICAL HISTORY:  HFrEF (heart failure with reduced ejection fraction)     Schizoaffective disorder

## 2020-06-18 NOTE — H&P ADULT - HISTORY OF PRESENT ILLNESS
This is a 59-year-old undomiciled man with history of schizoaffective disorder, HFrEF (3/2020 TTE EF24%, eccentric LVH) who presents with SOB and hypoxia. Pt was admitted 3/2020 for similar symptoms when he was diagnosed with HFrEF. At that time, patient treated for acute decompensated HFrEF. Started on lasix, ASA, statin, BB, and ACE. Pt then found to have multinodular opacities on CT, negative COVID, also completed course of antibiotics for PNA.     In the ED, T 98.5 -140 /107 hypoxic to 80s on RA, POX 97% on BiPAP 10/5 FiO2 50%      Pt treated with lasix 60 mg IV, nitroglycerin 0.4 mg sublingual. VBG PCO2 80, placed on BiPAP. Refused to tolerate, continually ripping mask off and shouting obscenities. Rather than intubate, attempted conscious sedation with ketamine to help with bronchodilation and tolerance of BiPAP. Then transitioned to precedex gtt. This is a 59-year-old undomiciled man with history of schizoaffective disorder, HFrEF (3/2020 TTE EF24%, eccentric LVH) who presents with SOB and hypoxia. Pt was admitted 3/2020 for similar symptoms when he was diagnosed with HFrEF. At that time, patient treated for acute decompensated HFrEF. Started on lasix, ASA, statin, BB, and ACE. Pt then found to have multinodular opacities on CT, negative COVID, also completed course of antibiotics for PNA.     In the ED, T 98.5 -140 /107 hypoxic to 80s on RA, POX 97% on BiPAP 11/5 FiO2 40%     Pt treated with lasix 60 mg IV, nitroglycerin 0.4 mg sublingual, and duonebs. VBG PCO2 80, placed on BiPAP. Refused to tolerate, continually ripping mask off and shouting obscenities. Rather than intubate, attempted conscious sedation with ketamine to help with bronchodilation and tolerance of BiPAP. Then transitioned to precedex gtt. This is a 60-year-old undomiciled man with history of schizoaffective disorder, HFrEF (3/2020 TTE EF24%, eccentric LVH) who presents with SOB and hypoxia. Pt was admitted 3/2020 for similar symptoms when he was diagnosed with HFrEF. At that time, patient treated for acute decompensated HFrEF. Started on lasix, ASA, statin, BB, and ACE. Pt then found to have multinodular opacities on CT, negative COVID, also completed course of antibiotics for PNA.     In the ED, T 98.5 -140 /107 hypoxic to 80s on RA, POX 97% on BiPAP 11/5 FiO2 40%     Pt treated with lasix 60 mg IV, nitroglycerin 0.4 mg sublingual, and duonebs. VBG PCO2 80, placed on BiPAP. Refused to tolerate, continually ripping mask off and shouting obscenities. Rather than intubate, attempted conscious sedation with ketamine to help with bronchodilation and tolerance of BiPAP. Then transitioned to precedex gtt.

## 2020-06-18 NOTE — ED ADULT NURSE REASSESSMENT NOTE - NS ED NURSE REASSESS COMMENT FT1
pt is RASS of 4, sedated, arousable to painful stimuli, not moving limbs independently or to command, breathing even and unlabored, respirations 12-20, good rate and depth. precedex at 0.2 mcg/kg/h. pt hypotensive, MD aware, no intervention at this time. pt on continuous cardiac monitoring and continuous pulse ox with nurse 1:1. will continue to monitor.

## 2020-06-18 NOTE — BEHAVIORAL HEALTH ASSESSMENT NOTE - NSBHCHARTREVIEWVS_PSY_A_CORE FT
Vital Signs Last 24 Hrs  T(C): 36.4 (18 Jun 2020 11:55), Max: 36.9 (17 Jun 2020 22:12)  T(F): 97.5 (18 Jun 2020 11:55), Max: 98.5 (17 Jun 2020 22:12)  HR: 69 (18 Jun 2020 12:05) (63 - 144)  BP: 98/83 (18 Jun 2020 11:55) (69/48 - 190/74)  BP(mean): 87 (18 Jun 2020 11:55) (61 - 105)  RR: 20 (18 Jun 2020 12:05) (11 - 35)  SpO2: 98% (18 Jun 2020 12:05) (90% - 100%)

## 2020-06-18 NOTE — BEHAVIORAL HEALTH ASSESSMENT NOTE - NSBHCHARTREVIEWLAB_PSY_A_CORE FT
CBC Full  -  ( 18 Jun 2020 07:55 )  WBC Count : 8.14 K/uL  RBC Count : 4.31 M/uL  Hemoglobin : 12.8 g/dL  Hematocrit : 40.8 %  Platelet Count - Automated : 390 K/uL  Mean Cell Volume : 94.7 fL  Mean Cell Hemoglobin : 29.7 pg  Mean Cell Hemoglobin Concentration : 31.4 %  Auto Neutrophil # : x  Auto Lymphocyte # : x  Auto Monocyte # : x  Auto Eosinophil # : x  Auto Basophil # : x  Auto Neutrophil % : x  Auto Lymphocyte % : x  Auto Monocyte % : x  Auto Eosinophil % : x  Auto Basophil % : x  06-18    140  |  100  |  14  ----------------------------<  164<H>  4.4   |  31  |  0.68    Ca    8.8      18 Jun 2020 07:55  Phos  4.2     06-18  Mg     2.2     06-18    TPro  6.6  /  Alb  2.9<L>  /  TBili  0.4  /  DBili  x   /  AST  34  /  ALT  41  /  AlkPhos  90  06-18

## 2020-06-18 NOTE — BEHAVIORAL HEALTH ASSESSMENT NOTE - SUMMARY
The patient is a 60 y/o single, unsure about living situation (used to live with brother earlier this year), on SSI, -American male with a h/o schizoaffective d/o, multiple prior [facility] admissions (Last documented one in 2018),  with h/o one SA via OD per prior assessment, is in treatment at [facility] with Dr Bolaños (last f/u was in March 2020), unknown legal hx/violence, has a substance abuse h/o cocaine, crack and marijuana use, [place] was found to be short of breath. In the ED, he was hypoxic, tachycardic. Pt treated with lasix 60 mg IV, nitroglycerin 0.4 mg sublingual, and duonebs. VBG PCO2 80, placed on BiPAP. Refused to tolerate, continually ripping mask off and shouting obscenities. Rather than intubate, attempted conscious sedation with ketamine to help with bronchodilation and tolerance of BiPAP. Then transitioned to precedex gtt. Worsening agitation/aggression when precedex dose is lowered or switched off. Monitored in the CTI. Psychiatry has been consulted for agitation management. Urine tox: THC +    During evaluation, patient is calm, but easily irritable, belligerent, nonsensical- ' aids is not the same as coronavirus'. No logical conversation. Checked UE- no rigidity or tremors. Discussed with RN- discussed the agitation/aggression. He is noted to be religiously preoccupied + paranoid in his statements prior, belligerent, cursing at RN.    Recommendations  - Safety maintained on current observation status. If behaviors escalade, team can consider 1:1CO.   - Check qtc again. As of now 522. Ensure that K, Mg, Po4 is wnl.   - Restart Depakote 750mg BID - do IV for now since NPO. Check VA level on Sunday AM- before AM dose of Depakote  - If repeat qtc is less than 500, and if can take PO, start Risperidone 0.5mg BID PO.   - AGGRESSION- IF QTC<500, Haldol 2.5mg q 6hrs prn IM/IV/PO. IF QTC>500, Can give Ativan 1mg q 6hrs prn IM/IV/PO.

## 2020-06-18 NOTE — PROGRESS NOTE ADULT - SUBJECTIVE AND OBJECTIVE BOX
Interval Events:  Overnight, patient was continued on precedex due to worsening agitation. Very uncooperative on exam and cursing and yelling at all providers. ROS unattainable, but patient is oriented to himself but cannot tel    REVIEW OF SYSTEMS:  Constitutional: [ ] negative [ ] fevers [ ] chills [ ] weight loss [ ] weight gain  HEENT: [ ] negative [ ] dry eyes [ ] eye irritation [ ] postnasal drip [ ] nasal congestion  CV: [ ] negative  [ ] chest pain [ ] orthopnea [ ] palpitations [ ] murmur  Resp: [ ] negative [ ] cough [ ] shortness of breath [ ] dyspnea [ ] wheezing [ ] sputum [ ] hemoptysis  GI: [ ] negative [ ] nausea [ ] vomiting [ ] diarrhea [ ] constipation [ ] abd pain [ ] dysphagia   : [ ] negative [ ] dysuria [ ] nocturia [ ] hematuria [ ] increased urinary frequency  Musculoskeletal: [ ] negative [ ] back pain [ ] myalgias [ ] arthralgias [ ] fracture  Skin: [ ] negative [ ] rash [ ] itch  Neurological: [ ] negative [ ] headache [ ] dizziness [ ] syncope [ ] weakness [ ] numbness  Psychiatric: [ ] negative [ ] anxiety [ ] depression  Endocrine: [ ] negative [ ] diabetes [ ] thyroid problem  Hematologic/Lymphatic: [ ] negative [ ] anemia [ ] bleeding problem  Allergic/Immunologic: [ ] negative [ ] itchy eyes [ ] nasal discharge [ ] hives [ ] angioedema  [ ] All other systems negative  [ ] Unable to assess ROS because ________    OBJECTIVE:  ICU Vital Signs Last 24 Hrs  T(C): 36.4 (2020 11:55), Max: 36.9 (2020 22:12)  T(F): 97.5 (2020 11:55), Max: 98.5 (2020 22:12)  HR: 69 (2020 12:05) (63 - 144)  BP: 98/83 (2020 11:55) (69/48 - 190/74)  BP(mean): 87 (2020 11:55) (61 - 105)  ABP: --  ABP(mean): --  RR: 20 (2020 12:05) (11 - 35)  SpO2: 98% (2020 12:05) (90% - 100%)        06-17 @ 07:  -   @ 07:00  --------------------------------------------------------  IN: 30 mL / OUT: 2050 mL / NET: - mL     @ 07:01   @ 12:39  --------------------------------------------------------  IN: 112 mL / OUT: 655 mL / NET: -543 mL      CAPILLARY BLOOD GLUCOSE      POCT Blood Glucose.: 141 mg/dL (2020 11:45)      PHYSICAL EXAM:  General:   HEENT:   Lymph Nodes:  Neck:   Respiratory:   Cardiovascular:   Abdomen:   Extremities:   Skin:   Neurological:  Psychiatry:    LINES:    HOSPITAL MEDICATIONS:  Standing Meds:  ALBUTerol    90 MICROgram(s) HFA Inhaler 2 Puff(s) Inhalation every 6 hours  chlorhexidine 4% Liquid 1 Application(s) Topical <User Schedule>  dexMEDEtomidine Infusion 0.5 MICROgram(s)/kG/Hr IV Continuous <Continuous>  dextrose 5%. 1000 milliLiter(s) IV Continuous <Continuous>  dextrose 50% Injectable 12.5 Gram(s) IV Push once  dextrose 50% Injectable 25 Gram(s) IV Push once  dextrose 50% Injectable 25 Gram(s) IV Push once  enoxaparin Injectable 40 milliGRAM(s) SubCutaneous daily  insulin lispro (HumaLOG) corrective regimen sliding scale   SubCutaneous every 6 hours  methylPREDNISolone sodium succinate Injectable 40 milliGRAM(s) IV Push daily      PRN Meds:  dextrose 40% Gel 15 Gram(s) Oral once PRN  glucagon  Injectable 1 milliGRAM(s) IntraMuscular once PRN      LABS:                        12.8   8.14  )-----------( 390      ( 2020 07:55 )             40.8     Hgb Trend: 12.8<--, 12.7<--      140  |  100  |  14  ----------------------------<  164<H>  4.4   |  31  |  0.68    Ca    8.8      2020 07:55  Phos  4.2     -  Mg     2.2     -18    TPro  6.6  /  Alb  2.9<L>  /  TBili  0.4  /  DBili  x   /  AST  34  /  ALT  41  /  AlkPhos  90  -    Creatinine Trend: 0.68<--, 0.88<--    Urinalysis Basic - ( 2020 07:54 )    Color: YELLOW / Appearance: CLEAR / S.019 / pH: 6.5  Gluc: NEGATIVE / Ketone: NEGATIVE  / Bili: NEGATIVE / Urobili: NORMAL   Blood: NEGATIVE / Protein: 20 / Nitrite: NEGATIVE   Leuk Esterase: NEGATIVE / RBC: 0-2 / WBC 0-2   Sq Epi: OCC / Non Sq Epi: x / Bacteria: NEGATIVE        Venous Blood Gas:   @ 02:49  7.32/67/63/29/89.6  VBG Lactate: 1.9  Venous Blood Gas:   @ 23:08  7.24/80/25/26/32.6  VBG Lactate: 2.0      MICROBIOLOGY:     RADIOLOGY:  [ ] Reviewed and interpreted by me    EKG: Interval Events:  Overnight, patient was continued on precedex due to worsening agitation. Very uncooperative on exam and cursing and yelling at all providers. ROS unattainable, but patient is oriented to himself but cannot tell what year it is.     REVIEW OF SYSTEMS:  Constitutional: [ ] negative [ ] fevers [ ] chills [ ] weight loss [ ] weight gain  HEENT: [ ] negative [ ] dry eyes [ ] eye irritation [ ] postnasal drip [ ] nasal congestion  CV: [ ] negative  [ ] chest pain [ ] orthopnea [ ] palpitations [ ] murmur  Resp: [ ] negative [ ] cough [ ] shortness of breath [ ] dyspnea [ ] wheezing [ ] sputum [ ] hemoptysis  GI: [ ] negative [ ] nausea [ ] vomiting [ ] diarrhea [ ] constipation [ ] abd pain [ ] dysphagia   : [ ] negative [ ] dysuria [ ] nocturia [ ] hematuria [ ] increased urinary frequency  Musculoskeletal: [ ] negative [ ] back pain [ ] myalgias [ ] arthralgias [ ] fracture  Skin: [ ] negative [ ] rash [ ] itch  Neurological: [ ] negative [ ] headache [ ] dizziness [ ] syncope [ ] weakness [ ] numbness  Psychiatric: [ ] negative [ ] anxiety [ ] depression  Endocrine: [ ] negative [ ] diabetes [ ] thyroid problem  Hematologic/Lymphatic: [ ] negative [ ] anemia [ ] bleeding problem  Allergic/Immunologic: [ ] negative [ ] itchy eyes [ ] nasal discharge [ ] hives [ ] angioedema  [ ] All other systems negative  [x] Unable to assess ROS because patient is altered, agitated and sedated.     OBJECTIVE:  ICU Vital Signs Last 24 Hrs  T(C): 36.4 (2020 11:55), Max: 36.9 (2020 22:12)  T(F): 97.5 (2020 11:55), Max: 98.5 (2020 22:12)  HR: 69 (2020 12:05) (63 - 144)  BP: 98/83 (2020 11:55) (69/48 - 190/74)  BP(mean): 87 (2020 11:55) (61 - 105)  ABP: --  ABP(mean): --  RR: 20 (2020 12:05) (11 - 35)  SpO2: 98% (2020 12:05) (90% - 100%)         @ 07:01  -   @ 07:00  --------------------------------------------------------  IN: 30 mL / OUT: 2050 mL / NET: - mL     @ 07: @ 12:39  --------------------------------------------------------  IN: 112 mL / OUT: 655 mL / NET: -543 mL      CAPILLARY BLOOD GLUCOSE      POCT Blood Glucose.: 141 mg/dL (2020 11:45)      PHYSICAL EXAM:  GENERAL: Angry, upset, does not want to cooperate  EYES: EOMI, PERRLA, conjunctiva and sclera clear  NECK: Supple  CHEST/LUNG: CTAB  HEART: Tachycardic; No murmurs, rubs, or gallops  ABDOMEN: Bowel sounds present; Soft, Nontender  EXTREMITIES:  no LE swelling, dry skin    NERVOUS SYSTEM: sedated   MSK: FROM all 4 extremities  SKIN: dry skin on LEs, multiple areas of excoriation    LINES:    HOSPITAL MEDICATIONS:  Standing Meds:  ALBUTerol    90 MICROgram(s) HFA Inhaler 2 Puff(s) Inhalation every 6 hours  chlorhexidine 4% Liquid 1 Application(s) Topical <User Schedule>  dexMEDEtomidine Infusion 0.5 MICROgram(s)/kG/Hr IV Continuous <Continuous>  dextrose 5%. 1000 milliLiter(s) IV Continuous <Continuous>  dextrose 50% Injectable 12.5 Gram(s) IV Push once  dextrose 50% Injectable 25 Gram(s) IV Push once  dextrose 50% Injectable 25 Gram(s) IV Push once  enoxaparin Injectable 40 milliGRAM(s) SubCutaneous daily  insulin lispro (HumaLOG) corrective regimen sliding scale   SubCutaneous every 6 hours  methylPREDNISolone sodium succinate Injectable 40 milliGRAM(s) IV Push daily      PRN Meds:  dextrose 40% Gel 15 Gram(s) Oral once PRN  glucagon  Injectable 1 milliGRAM(s) IntraMuscular once PRN      LABS:                        12.8   8.14  )-----------( 390      ( 2020 07:55 )             40.8     Hgb Trend: 12.8<--, 12.7<--  18    140  |  100  |  14  ----------------------------<  164<H>  4.4   |  31  |  0.68    Ca    8.8      2020 07:55  Phos  4.2     -18  Mg     2.2     18    TPro  6.6  /  Alb  2.9<L>  /  TBili  0.4  /  DBili  x   /  AST  34  /  ALT  41  /  AlkPhos  90  18    Creatinine Trend: 0.68<--, 0.88<--    Urinalysis Basic - ( 2020 07:54 )    Color: YELLOW / Appearance: CLEAR / S.019 / pH: 6.5  Gluc: NEGATIVE / Ketone: NEGATIVE  / Bili: NEGATIVE / Urobili: NORMAL   Blood: NEGATIVE / Protein: 20 / Nitrite: NEGATIVE   Leuk Esterase: NEGATIVE / RBC: 0-2 / WBC 0-2   Sq Epi: OCC / Non Sq Epi: x / Bacteria: NEGATIVE        Venous Blood Gas:   @ 02:49  7.32/67/63/29/89.6  VBG Lactate: 1.9  Venous Blood Gas:   @ 23:08  7.24/80/25/26/32.6  VBG Lactate: 2.0      MICROBIOLOGY:     RADIOLOGY:  [ ] Reviewed and interpreted by me    EKG:

## 2020-06-18 NOTE — BEHAVIORAL HEALTH ASSESSMENT NOTE - RISK ASSESSMENT
Low Acute Suicide Risk has risks- older male, schizoaffective d/o, history of noncompliance, homeless, medical issues, agitated earlier today.

## 2020-06-19 DIAGNOSIS — Z29.9 ENCOUNTER FOR PROPHYLACTIC MEASURES, UNSPECIFIED: ICD-10-CM

## 2020-06-19 DIAGNOSIS — L30.9 DERMATITIS, UNSPECIFIED: ICD-10-CM

## 2020-06-19 DIAGNOSIS — E11.9 TYPE 2 DIABETES MELLITUS WITHOUT COMPLICATIONS: ICD-10-CM

## 2020-06-19 DIAGNOSIS — R09.02 HYPOXEMIA: ICD-10-CM

## 2020-06-19 DIAGNOSIS — F25.9 SCHIZOAFFECTIVE DISORDER, UNSPECIFIED: ICD-10-CM

## 2020-06-19 DIAGNOSIS — R45.1 RESTLESSNESS AND AGITATION: ICD-10-CM

## 2020-06-19 DIAGNOSIS — I50.20 UNSPECIFIED SYSTOLIC (CONGESTIVE) HEART FAILURE: ICD-10-CM

## 2020-06-19 DIAGNOSIS — R94.2 ABNORMAL RESULTS OF PULMONARY FUNCTION STUDIES: ICD-10-CM

## 2020-06-19 LAB
GLUCOSE BLDC GLUCOMTR-MCNC: 142 MG/DL — HIGH (ref 70–99)
GLUCOSE BLDC GLUCOMTR-MCNC: 148 MG/DL — HIGH (ref 70–99)
GLUCOSE BLDC GLUCOMTR-MCNC: 190 MG/DL — HIGH (ref 70–99)
HIV 1+2 AB+HIV1 P24 AG SERPL QL IA: SIGNIFICANT CHANGE UP
L PNEUMO AG UR QL: NEGATIVE — SIGNIFICANT CHANGE UP
PROCALCITONIN SERPL-MCNC: 0.08 NG/ML — SIGNIFICANT CHANGE UP (ref 0.02–0.1)

## 2020-06-19 PROCEDURE — 99233 SBSQ HOSP IP/OBS HIGH 50: CPT

## 2020-06-19 PROCEDURE — 99291 CRITICAL CARE FIRST HOUR: CPT

## 2020-06-19 PROCEDURE — 99222 1ST HOSP IP/OBS MODERATE 55: CPT

## 2020-06-19 RX ORDER — SOD,AMMONIUM,POTASSIUM LACTATE
1 CREAM (GRAM) TOPICAL
Refills: 0 | Status: DISCONTINUED | OUTPATIENT
Start: 2020-06-19 | End: 2020-06-22

## 2020-06-19 RX ORDER — CISATRACURIUM BESYLATE 2 MG/ML
3 INJECTION INTRAVENOUS
Qty: 200 | Refills: 0 | Status: DISCONTINUED | OUTPATIENT
Start: 2020-06-19 | End: 2020-06-19

## 2020-06-19 RX ORDER — PROPOFOL 10 MG/ML
50 INJECTION, EMULSION INTRAVENOUS
Qty: 1000 | Refills: 0 | Status: DISCONTINUED | OUTPATIENT
Start: 2020-06-19 | End: 2020-06-19

## 2020-06-19 RX ADMIN — Medication 40 MILLIGRAM(S): at 05:26

## 2020-06-19 RX ADMIN — Medication 28.75 MILLIGRAM(S): at 05:26

## 2020-06-19 RX ADMIN — Medication 28.75 MILLIGRAM(S): at 20:03

## 2020-06-19 RX ADMIN — CHLORHEXIDINE GLUCONATE 1 APPLICATION(S): 213 SOLUTION TOPICAL at 05:35

## 2020-06-19 NOTE — CONSULT NOTE ADULT - ASSESSMENT
59 yo male patient with xerosis of b/l feet with dystrophic toenails  - Pt seen and evaluated  - b/l foot xerosis and dystrophic toenails without signs of infection  - Feet cleaned with sterile water  - Attending will address toenails Monday  - F/u as outpatient at [facility] for routine foot care [phone]  - Podiatry signing off, please reconsult as needed  - Discussed w/ atttending

## 2020-06-19 NOTE — PROGRESS NOTE BEHAVIORAL HEALTH - NSBHFUPINTERVALHXFT_PSY_A_CORE
Met with the patient. Contrary to yesterday it was noted that he was less irritable, and more amenable to interview. He was alert and oriented. Tends to be overinclusive, tangential and perseverative, and still nonsensical at times. Knows he has a diagnosis of Bipolar disorder vs Schizophrenia, states that he was in treatment with his psychiatrist at [facility], and has been taking Depakote, Cogentin and Risperidone. He denies any SI or HI when asked today. Can be suspicious and records from [facility] indicate that at baseline has delusions and paranoia.   He states that he is keen on getting back on Depakote and Risperidone, and states- ' I want you to arrange for me to go somewhere long term. My nephew will not let me stay with him'.

## 2020-06-19 NOTE — PROGRESS NOTE ADULT - PROBLEM SELECTOR PLAN 2
Hx of schizoaffective disorder, home meds risperidone 1 mg BID, valproic acid 750 mg BID, fluphenazine 5 mg BID, and benztropine 0.5 mg BID   - Restart meds when able to tolerate PO   - Psych recs appreciated: depakote  BID  - qtc this AM was 522, will get repeat, if <500, will do risperidone 0.5mg BID Hx of schizoaffective disorder, home meds risperidone 1 mg BID, valproic acid 750 mg BID, fluphenazine 5 mg BID, and benztropine 0.5 mg BID   - Restart meds when able to tolerate PO   - Psych recs appreciated: depakote  BID  - qtc this AM was 522, will get repeat, if <500, will do risperidone 0.5mg BID  Psychiatry consult req

## 2020-06-19 NOTE — PROGRESS NOTE ADULT - ASSESSMENT
This is a 60 year old undomiciled man with history of schizoaffective disorder, HFrEF (3/2020 TTE EF24%, eccentric LVH) who presented with SOB and hypoxia. Admitted to MICU for hypercapnic respiratory failure requiring BiPAP, also started on Precedex given agitation.     #Neuro   - Agitation, pulling BiPAP off and cursing   - Now sedated with Precedex for agitation  - Wean as tolerated     #Psych   - Hx of schizoaffective disorder, home meds risperidone 1 mg BID, valproic acid 750 mg BID, fluphenazine 5 mg BID, and benztropine 0.5 mg BID   - Restart meds when able to tolerate PO   - Psych recs appreciated: depakote  BID  - qtc this AM was 522, will get repeat, if <500, will do risperidone 0.5mg BID    #Respiratory  Hypercapnic respiratory failure requiring BiPAP   - S/p lasix 60 mg IV, duonebs, and dexamethasone 10 mg IV in the ED   - Etiology 2/2 underlying obstructive disease (given smoking history) vs. COVID PNA vs. HFrEF exacerbation (given previous admission 3/2020 for acute decompensated HF, EF 24%) vs. PE (less likely)     - Monitor PCO2, 80 --> 67   - BNP 1491   - C/w BiPAP 11/5, FiO2 40, 12  - C/w steroids and duonebs for COPD exacerbation   - Consider CTA r/o PE if worsens clinically   - monitor off abx  Nodularity on CT scan  - given hx of smoking and past CT scan shows nodules, there is a concern for malignancy. patient will need a repeat  CT scan once acute issues resolve.     #CV  - Hypotensive SBP 90s, 2/2 precedex vs. r/o sepsis vs. decompensated heart failure, although does not look to be in decompensated HF (EF 25% 3/2020)   - Hold home lisinopril 2.5 mg, lasix 20 mg PO, and metoprolol succinate 50 mg in the setting of hypotension   - Restart ASA 81 and atorvastatin 40 when able to tolerate PO   - EKG showed , will get repeat later  - Maintain MAP > 65   - Lasix PRN for fluid overload if BP tolerates     #GI   - NPO while on BiPAP   - now NPO because of sedation, will get bedside swallow when more cooperative    #Renal   - No active issues     #Heme   - Hb 12.7 (b/l 12)  - Continue to trend CBC  - If downtrending, will send anemia workup     #ID  - Does meet SIRS criteria, tachypneic and tachycardic, no source    - CXR bilateral linear and patchy opacities, which have increased compared to prior 3/2020, 2/2 pulmonary edema vs. infection, also small R pleural effusion    - COVID-19 Negative x1, will get repeat   - Will send urine legionella, blood cultures, U/A, urine culture, (thus far negative)  - will also check HIV, syphillis, and QuantiFeron gold given undomiciled status   - Will hold off on antibiotics at this time, start if clinical status worsens or patient spikes fever     #Endo   - TSH WNL   - A1c 6.5 3/2020, will repeat A1c and do ISS     #DVT PPX   - Lovenox     #Med Rec  - Home medications as per discharge summary 3/2020, will need to confirm with pharmacy, as well as with patient once off sedation This is a 60 year old undomiciled man with history of schizoaffective disorder, HFrEF (3/2020 TTE EF24%, eccentric LVH) who presented with SOB and hypoxia. Admitted to MICU for hypercapnic respiratory failure requiring BiPAP, also started on Precedex given agitation.         #Psych schizoaffective disorder,    #Respiratory- Hypercapnic respiratory failure requiring BiPAP   Nodularity on CT scan  - given hx of smoking and past CT scan shows nodules, there is a concern for malignancy.     #CV- HF  - Lasix PRN for fluid overload if BP tolerates     #GI   - NPO while on BiPAP   - now NPO because of sedation, will get bedside swallow     #ID  - Does meet SIRS criteria, tachypneic and tachycardic, no source    - CXR bilateral linear and patchy opacities, which have increased compared to prior 3/2020, 2/2 pulmonary edema vs. infection, also small R pleural effusion    - COVID-19 Negative x1, will get repeat   - Will send urine legionella, blood cultures, U/A, urine culture, (thus far negative)  - will also check HIV, syphillis, and QuantiFeron gold given undomiciled status   - Will hold off on antibiotics at this time, start if clinical status worsens or patient spikes fever     #Endo   - TSH WNL   - A1c 6.5 3/2020, will repeat A1c and do ISS     #DVT PPX   - Lovenox

## 2020-06-19 NOTE — CHART NOTE - NSCHARTNOTEFT_GEN_A_CORE
Dermatology Chart Note    HPI:    60-year-old undomiciled man with history of schizoaffective disorder, HFrEF (3/2020 TTE EF24%, eccentric LVH) who presents with HFrEF exacerbation. Patient was on BiPaP but rrefused to tolerate, continually ripping mask off and shouting obscenities.    Dermatology is consulted for lesion on b/l ankles and feet x at least several months. Not itchy. Patient is homeless.    PHYSICAL EXAM:  Vital Signs Last 24 Hrs  T(C): 36.8 (19 Jun 2020 05:13), Max: 36.8 (19 Jun 2020 05:13)  T(F): 98.2 (19 Jun 2020 05:13), Max: 98.2 (19 Jun 2020 05:13)  HR: 72 (19 Jun 2020 05:13) (62 - 88)  BP: 98/68 (19 Jun 2020 05:13) (82/57 - 101/56)  BP(mean): 66 (19 Jun 2020 00:00) (63 - 75)  RR: 18 (19 Jun 2020 05:13) (11 - 28)  SpO2: 98% (19 Jun 2020 08:27) (72% - 99%)    Skin exam notable for:  The patient was alert and oriented X 3, well nourished, and in no apparent distress. Oropharynx showed no ulcerations. There was no visible lymphadenopathy. Conjunctiva were non-injected. There was no clubbing or edema of extremities.    The scalp, hair, face, eyebrows, lips, oropharynx , neck, chest, back, buttocks, extremities X 4, hands, feet, nails were examined. There was no hyperhidrosis or bromhidrosis.     The following lesions are noted:     PE: Hyperpigmented, verrucous plaques on ankles and distal feet, with a background of superficial desquamation and scaling. Clear demarcation line slightly above ankles.    ASSESSMENT/PLAN:  1) Dermatosis neglecta  -2/2 poor hygiene/inadequate cleaning of the skin, resulting in accumulation of sebum, sweat, keratin, debris on unwashed areas of the skin.  -No evidence of infestation  -Gently scrub the affected area with soap and water twice daily using a washcloth. Continue until hyperpigmentation and scaling resolve.  -start ammonium lactate cream or lotion BID to affected area.    Discussed with primary team.  Discussed with attending, Dr. Ferrer.    - Dermatology to sign off on this patient.    Raymond Woods MD  PGY2, Dermatology

## 2020-06-19 NOTE — CONSULT NOTE ADULT - ATTENDING COMMENTS
60 year old male with chronic HFrEF, recent HF admission in March, schizoaffective disorder, DMT2 presented with hypoxia found to have hypercapnic respiratory failure, admitted to MICU with BiPaP support.  Patient was found to have mildly prolonged QT/QTc 488-524ms/533-553ms on EKG's done on 6/18.  The etiology of prolonged QT/QTc most likely secondary to electrolytes abnormality and Risperidone.    -Repeat daily EKG if possible to monitor QT/QTc.  Resume lower dose of Risperidone only if Qt/QTc <500ms    -Optimize electrolytes, Keep K >4 and Keep Mag >2.3 to prevent TdP (torsades alexander pointes), consider daily supplement if on diuretic therapy   -Continue guideline directed medical therapy for HFrEF

## 2020-06-19 NOTE — PROGRESS NOTE ADULT - PROBLEM SELECTOR PLAN 7
Dermatology consult= contact isolation  R/o Scabies??????- some one was concerned scabies bug was seen.  ?? severe tinea pedis

## 2020-06-19 NOTE — PROGRESS NOTE ADULT - SUBJECTIVE AND OBJECTIVE BOX
Patient is a 60y old  Male who presents with a chief complaint of shortness of breath (2020 11:36)      SUBJECTIVE / OVERNIGHT EVENTS:  Patient mildly agitated- wants you to listen to everything - otherwise he gets upset.  He note his sister was a " witch"  he has foot/ skin issues for a while due to " family not allowing him to live in home"        MEDICATIONS  (STANDING):  albuterol/ipratropium for Nebulization 3 milliLiter(s) Nebulizer every 6 hours  chlorhexidine 4% Liquid 1 Application(s) Topical <User Schedule>  dextrose 5%. 1000 milliLiter(s) (50 mL/Hr) IV Continuous <Continuous>  dextrose 50% Injectable 12.5 Gram(s) IV Push once  dextrose 50% Injectable 25 Gram(s) IV Push once  dextrose 50% Injectable 25 Gram(s) IV Push once  enoxaparin Injectable 40 milliGRAM(s) SubCutaneous daily  insulin lispro (HumaLOG) corrective regimen sliding scale   SubCutaneous every 6 hours  methylPREDNISolone sodium succinate Injectable 40 milliGRAM(s) IV Push daily  valproate sodium IVPB 750 milliGRAM(s) IV Intermittent every 12 hours    MEDICATIONS  (PRN):  dextrose 40% Gel 15 Gram(s) Oral once PRN Blood Glucose LESS THAN 70 milliGRAM(s)/deciliter  glucagon  Injectable 1 milliGRAM(s) IntraMuscular once PRN Glucose LESS THAN 70 milligrams/deciliter        CAPILLARY BLOOD GLUCOSE      POCT Blood Glucose.: 142 mg/dL (2020 06:30)  POCT Blood Glucose.: 157 mg/dL (2020 16:37)    I&O's Summary    2020 07:01  -  2020 07:00  --------------------------------------------------------  IN: 238 mL / OUT: 1380 mL / NET: -1142 mL      Vital Signs Last 24 Hrs  T(C): 36.8 (2020 05:13), Max: 36.8 (2020 05:13)  T(F): 98.2 (2020 05:13), Max: 98.2 (2020 05:13)  HR: 72 (2020 05:13) (62 - 88)  BP: 98/68 (2020 05:13) (82/57 - 101/56)  BP(mean): 66 (2020 00:00) (63 - 75)  RR: 18 (2020 05:13) (11 - 28)  SpO2: 98% (2020 08:27) (72% - 99%)  PHYSICAL EXAM:  GENERAL: NAD, well-developed  HEAD:  Atraumatic, Normocephalic  EYES: EOMI, PERRLA, conjunctiva and sclera clear  NECK: Supple, No JVD  CHEST/LUNG: Clear to auscultation bilaterally; No wheeze  HEART: Regular rate and rhythm; No murmurs, rubs, or gallops  ABDOMEN: Soft, Nontender, Nondistended; Bowel sounds present  EXTREMITIES:  2+ Peripheral Pulses, No clubbing, cyanosis, or edema  PSYCH: Alert- tangential speech mild agitation  NEUROLOGY: R arm muscle atrophy compared to L arm muscle.  SKIN:   B/l foot skin changes with areas of excoriation.    LABS:                        12.8   8.14  )-----------( 390      ( 2020 07:55 )             40.8     06-18    140  |  100  |  14  ----------------------------<  164<H>  4.4   |  31  |  0.68    Ca    8.8      2020 07:55  Phos  4.2     06-18  Mg     2.2     06-18    TPro  6.6  /  Alb  2.9<L>  /  TBili  0.4  /  DBili  x   /  AST  34  /  ALT  41  /  AlkPhos  90  06-18          Urinalysis Basic - ( 2020 07:54 )    Color: YELLOW / Appearance: CLEAR / S.019 / pH: 6.5  Gluc: NEGATIVE / Ketone: NEGATIVE  / Bili: NEGATIVE / Urobili: NORMAL   Blood: NEGATIVE / Protein: 20 / Nitrite: NEGATIVE   Leuk Esterase: NEGATIVE / RBC: 0-2 / WBC 0-2   Sq Epi: OCC / Non Sq Epi: x / Bacteria: NEGATIVE        RADIOLOGY & ADDITIONAL TESTS:    Imaging Personally Reviewed:    Consultant(s) Notes Reviewed:      Care Discussed with Consultants/Other Providers:

## 2020-06-19 NOTE — CONSULT NOTE ADULT - SUBJECTIVE AND OBJECTIVE BOX
Podiatry pager #: [phone]    Patient is a 60y old  Male who presents with a chief complaint of shortness of breath (19 Jun 2020 11:36)      HPI:  This is a 60-year-old undomiciled man with history of schizoaffective disorder, HFrEF (3/2020 TTE EF24%, eccentric LVH) who presents with SOB and hypoxia. Pt was admitted 3/2020 for similar symptoms when he was diagnosed with HFrEF. At that time, patient treated for acute decompensated HFrEF. Started on lasix, ASA, statin, BB, and ACE. Pt then found to have multinodular opacities on CT, negative COVID, also completed course of antibiotics for PNA.     In the ED, T 98.5 -140 /107 hypoxic to 80s on RA, POX 97% on BiPAP 11/5 FiO2 40%     Pt treated with lasix 60 mg IV, nitroglycerin 0.4 mg sublingual, and duonebs. VBG PCO2 80, placed on BiPAP. Refused to tolerate, continually ripping mask off and shouting obscenities. Rather than intubate, attempted conscious sedation with ketamine to help with bronchodilation and tolerance of BiPAP. Then transitioned to precedex gtt. (18 Jun 2020 04:24)    Pod c/s for toenails/foot discoloration on 6/19.      PAST MEDICAL & SURGICAL HISTORY:  HFrEF (heart failure with reduced ejection fraction)  Schizoaffective disorder  No significant past surgical history      MEDICATIONS  (STANDING):  albuterol/ipratropium for Nebulization 3 milliLiter(s) Nebulizer every 6 hours  ammonium lactate 12% Lotion 1 Application(s) Topical two times a day  chlorhexidine 4% Liquid 1 Application(s) Topical <User Schedule>  dextrose 5%. 1000 milliLiter(s) (50 mL/Hr) IV Continuous <Continuous>  dextrose 50% Injectable 12.5 Gram(s) IV Push once  dextrose 50% Injectable 25 Gram(s) IV Push once  dextrose 50% Injectable 25 Gram(s) IV Push once  enoxaparin Injectable 40 milliGRAM(s) SubCutaneous daily  insulin lispro (HumaLOG) corrective regimen sliding scale   SubCutaneous every 6 hours  methylPREDNISolone sodium succinate Injectable 40 milliGRAM(s) IV Push daily  valproate sodium IVPB 750 milliGRAM(s) IV Intermittent every 12 hours    MEDICATIONS  (PRN):  dextrose 40% Gel 15 Gram(s) Oral once PRN Blood Glucose LESS THAN 70 milliGRAM(s)/deciliter  glucagon  Injectable 1 milliGRAM(s) IntraMuscular once PRN Glucose LESS THAN 70 milligrams/deciliter      Allergies    No Known Allergies    Intolerances        VITALS:    Vital Signs Last 24 Hrs  T(C): 36.8 (19 Jun 2020 05:13), Max: 36.8 (19 Jun 2020 05:13)  T(F): 98.2 (19 Jun 2020 05:13), Max: 98.2 (19 Jun 2020 05:13)  HR: 72 (19 Jun 2020 05:13) (64 - 88)  BP: 98/68 (19 Jun 2020 05:13) (82/57 - 101/56)  BP(mean): 66 (19 Jun 2020 00:00) (63 - 75)  RR: 18 (19 Jun 2020 05:13) (11 - 24)  SpO2: 98% (19 Jun 2020 08:27) (72% - 99%)    LABS:                          12.8   8.14  )-----------( 390      ( 18 Jun 2020 07:55 )             40.8       06-18    140  |  100  |  14  ----------------------------<  164<H>  4.4   |  31  |  0.68    Ca    8.8      18 Jun 2020 07:55  Phos  4.2     06-18  Mg     2.2     06-18    TPro  6.6  /  Alb  2.9<L>  /  TBili  0.4  /  DBili  x   /  AST  34  /  ALT  41  /  AlkPhos  90  06-18      CAPILLARY BLOOD GLUCOSE      POCT Blood Glucose.: 142 mg/dL (19 Jun 2020 06:30)  POCT Blood Glucose.: 157 mg/dL (18 Jun 2020 16:37)          LOWER EXTREMITY PHYSICAL EXAM:    Vasular: DP/PT 1/4, B/L, CFT <3 seconds B/L, Temperature gradient WNL B/L.   Neuro: Epicritic sensation diminished to the level of digits B/L.  Musculoskeletal/Ortho: unremarkable  Skin:  No open lesions/wounds to b/l foot and ankle. Dry scabbing/scaly skin b/l foot and ankle 2/2 poor hygiene and xerosis. Hypertrophic and dystrophic Hallux toenails. No signs of infection    RADIOLOGY & ADDITIONAL STUDIES:

## 2020-06-19 NOTE — CONSULT NOTE ADULT - SUBJECTIVE AND OBJECTIVE BOX
Patient is a 60y old  Male who presents with a chief complaint of shortness of breath (2020 16:00)          HPI:  60 year old male with chronic HFrEF, recent HF admission in March, schizoaffective disorder, DMT2 presented with hypoxia found to have hypercapnic respiratory failure, admitted to MICU with BiPaP support.  Patient was found to have prolonged QT/QTc 488-524ms/533-553ms on EKG's done on .  Of note, patient was hypocalcemic with Ca 7.4 upon admission.  He is not on telemetry monitoring.  No available EKG today.   His Risperidone dose was held.        PAST MEDICAL & SURGICAL HISTORY:  HFrEF (heart failure with reduced ejection fraction)  Schizoaffective disorder  No significant past surgical history      MEDICATIONS  (STANDING):  albuterol/ipratropium for Nebulization 3 milliLiter(s) Nebulizer every 6 hours  ammonium lactate 12% Lotion 1 Application(s) Topical two times a day  chlorhexidine 4% Liquid 1 Application(s) Topical <User Schedule>  dextrose 5%. 1000 milliLiter(s) (50 mL/Hr) IV Continuous <Continuous>  dextrose 50% Injectable 12.5 Gram(s) IV Push once  dextrose 50% Injectable 25 Gram(s) IV Push once  dextrose 50% Injectable 25 Gram(s) IV Push once  enoxaparin Injectable 40 milliGRAM(s) SubCutaneous daily  insulin lispro (HumaLOG) corrective regimen sliding scale   SubCutaneous every 6 hours  methylPREDNISolone sodium succinate Injectable 40 milliGRAM(s) IV Push daily  valproate sodium IVPB 750 milliGRAM(s) IV Intermittent every 12 hours    MEDICATIONS  (PRN):  dextrose 40% Gel 15 Gram(s) Oral once PRN Blood Glucose LESS THAN 70 milliGRAM(s)/deciliter  glucagon  Injectable 1 milliGRAM(s) IntraMuscular once PRN Glucose LESS THAN 70 milligrams/deciliter    Allergies    No Known Allergies    Intolerances      FAMILY HISTORY:  No pertinent family history in first degree relatives      SOCIAL HISTORY:  Denies smoking; no   Alcohol  or  Drug abuse       REVIEW OF SYSTEMS:    CONSTITUTIONAL: No fever, weight loss, chills, shakes, or fatigue  EYES: No eye pain, visual disturbances, or discharge  ENMT:  No difficulty hearing, tinnitus, vertigo; No sinus or throat pain  NECK: No pain or stiffness  RESPIRATORY: No cough, wheezing, hemoptysis, + shortness of breath  CARDIOVASCULAR: No chest pain, dyspnea, palpitations, dizziness, syncope, paroxysmal nocturnal dyspnea, orthopnea, or arm or leg swelling  GASTROINTESTINAL: No abdominal  or epigastric pain, nausea, vomiting, hematemesis, diarrhea, constipation, melena or bright red blood.  GENITOURINARY: No dysuria, nocturia, hematuria, or urinary incontinence  NEUROLOGICAL: No headaches, memory loss, slurred speech, limb weakness, loss of strength, numbness, or tremors  SKIN: No itching, burning, rashes, or lesions   LYMPH NODES: No enlarged glands  ENDOCRINE: No heat or cold intolerance, or hair loss  MUSCULOSKELETAL: No joint pain or swelling, muscle, back, or extremity pain  PSYCHIATRIC: No depression, anxiety, or difficulty sleeping  HEME/LYMPH: No easy bruising or bleeding gums  ALLERY AND IMMUNOLOGIC: No hives or rash.      Vital Signs Last 24 Hrs  T(C): 36.8 (2020 05:13), Max: 36.8 (2020 05:13)  T(F): 98.2 (2020 05:13), Max: 98.2 (2020 05:13)  HR: 72 (2020 05:13) (64 - 88)  BP: 98/68 (2020 05:13) (82/57 - 101/56)  BP(mean): 66 (2020 00:00) (63 - 75)  RR: 18 (2020 05:13) (12 - 24)  SpO2: 98% (2020 08:27) (72% - 99%)    PHYSICAL EXAM:  No physical exam done as staff reported seeing bedbugs         INTERPRETATION OF TELEMETRY:  Sinus rhythm with occasional PVC's/APC's    EC/18  SR sinus arrhythmia at 67; QT//553ms         LABS:                        12.8   8.14  )-----------( 390      ( 2020 07:55 )             40.8     -18    140  |  100  |  14  ----------------------------<  164<H>  4.4   |  31  |  0.68    Ca    8.8      2020 07:55  Phos  4.2     06-18  Mg     2.2     06-18    TPro  6.6  /  Alb  2.9<L>  /  TBili  0.4  /  DBili  x   /  AST  34  /  ALT  41  /  AlkPhos  90  06-18          Urinalysis Basic - ( 2020 07:54 )    Color: YELLOW / Appearance: CLEAR / S.019 / pH: 6.5  Gluc: NEGATIVE / Ketone: NEGATIVE  / Bili: NEGATIVE / Urobili: NORMAL   Blood: NEGATIVE / Protein: 20 / Nitrite: NEGATIVE   Leuk Esterase: NEGATIVE / RBC: 0-2 / WBC 0-2   Sq Epi: OCC / Non Sq Epi: x / Bacteria: NEGATIVE        BNP  RADIOLOGY & ADDITIONAL STUDIES:  FINDINGS:  Bilateral linear and patchy opacities, which have increased compared to prior. These findings are consistent with pulmonary edema. Trace right pleural effusion.    No pneumothorax.  Cardiac size cannot accurately be assessed in this projection.       IMPRESSION: CHF.            PREVIOUS DIAGNOSTIC TESTING:      ECHO FINDINGS:  ------------------------------------------------------------------------  OBSERVATIONS:  Mitral Valve: Normal mitral valve. Minimal mitral  regurgitation.  Aortic Root: Normal aortic root.  Aortic Valve: Normal trileaflet aortic valve.  Left Atrium: Normal left atrium.  LA volume index = 18  cc/m2.  Left Ventricle: Severe global left ventricular systolic  dysfunction. Eccentric left ventricular hypertrophy  (dilated left ventricle with normal relative wall  thickness).  Right Heart: Normal right atrium. Normal right ventricular  size and function. Normal tricuspid valve. Minimal  tricuspid regurgitation. Normal pulmonic valve. Mild  pulmonic regurgitation.  Pericardium/PleuraNormal pericardium with no pericardial  effusion.  ------------------------------------------------------------------------  CONCLUSIONS:  1. Normal mitral valve. Minimal mitral regurgitation.  2. Eccentric left ventricular hypertrophy (dilated left  ventricle with normal relative wall thickness).  3. Severe global left ventricular systolic dysfunction.  4. Normal right ventricular size and function.  *** No previous Echo exam.  ------------------------------------------------------------------------  Confirmed on  3/25/2020 - 13:54:22 by Pardeep Jean M.D.  ------------------------------------------------------------------------    STRESS FINDINGS:    CATHETERIZATION FINDINGS:

## 2020-06-19 NOTE — CHART NOTE - NSCHARTNOTEFT_GEN_A_CORE
Patient is 60 year old man with history of schizoaffective disorder, HFrEF EF 24%, eccentric LVH  presented with SOB and hypoxia. Patient admitted to the MICU for hypoxic respiratory failure secondary to CHF exacerbation vs COPD exacerbation. Patient was given Lasix 60 mg IVP, nitroglycerin, and Duoneb. Patient was placed on BIPAP, but refused to tolerate, ripping off the mask and required Precedex for sedation. Physiatry was consulted and recommended Valproic acid 750 mg BID IVP. Patient's mental status now improved, off Precedex but continues to appear to have visual hallucination. O2 saturation >95% on 2 L NC.     Things to follow up:  [ ] Follow up on pscyh recs  [ ] If agitated, can give Ativan 1 mg PRN, avoid Haloperidol due to patient's prolonged QTC  [ ] EKG daily   [ ] Continue with Solumedrol, last day 6/23  [ ] Follow up urine legionella, blood cultures, U/A, urine culture  [ ] Follow up HIV, syphilis, and QuantiFeron gold given undomiciled status   [ ] of mental status improves, bedside swallow  [ ] restart oral home BP and HF meds as BP tolerates    [place] PGY-3  Internal medicine MAR  [id number]

## 2020-06-19 NOTE — PROGRESS NOTE BEHAVIORAL HEALTH - NSBHCHARTREVIEWVS_PSY_A_CORE FT
Vital Signs Last 24 Hrs  T(C): 36.8 (19 Jun 2020 05:13), Max: 36.8 (19 Jun 2020 05:13)  T(F): 98.2 (19 Jun 2020 05:13), Max: 98.2 (19 Jun 2020 05:13)  HR: 72 (19 Jun 2020 05:13) (62 - 88)  BP: 98/68 (19 Jun 2020 05:13) (82/57 - 101/56)  BP(mean): 66 (19 Jun 2020 00:00) (63 - 75)  RR: 18 (19 Jun 2020 05:13) (11 - 28)  SpO2: 98% (19 Jun 2020 08:27) (72% - 99%)

## 2020-06-19 NOTE — PROGRESS NOTE ADULT - ATTENDING COMMENTS
dvt proph- lovenox sq dvt proph- lovenox sq  Derm - r/o scabies ?? vs severe tinea pedis  Psych to f/u assessment dvt proph- Lovenox sq  Derm - r/o scabies ?? vs severe tinea pedis  Psych to f/u assessment    called brother Dayana [phone]- and updated him- he notes that when patient is taking his medicine he is fine but then he stop taking medicine and become disrespectful and disruptive.

## 2020-06-20 LAB
ANION GAP SERPL CALC-SCNC: 9 MMO/L — SIGNIFICANT CHANGE UP (ref 7–14)
BUN SERPL-MCNC: 12 MG/DL — SIGNIFICANT CHANGE UP (ref 7–23)
CALCIUM SERPL-MCNC: 8.9 MG/DL — SIGNIFICANT CHANGE UP (ref 8.4–10.5)
CHLORIDE SERPL-SCNC: 96 MMOL/L — LOW (ref 98–107)
CO2 SERPL-SCNC: 33 MMOL/L — HIGH (ref 22–31)
CREAT SERPL-MCNC: 0.87 MG/DL — SIGNIFICANT CHANGE UP (ref 0.5–1.3)
GLUCOSE BLDC GLUCOMTR-MCNC: 116 MG/DL — HIGH (ref 70–99)
GLUCOSE BLDC GLUCOMTR-MCNC: 149 MG/DL — HIGH (ref 70–99)
GLUCOSE BLDC GLUCOMTR-MCNC: 197 MG/DL — HIGH (ref 70–99)
GLUCOSE SERPL-MCNC: 192 MG/DL — HIGH (ref 70–99)
HBA1C BLD-MCNC: 6.6 % — HIGH (ref 4–5.6)
HCT VFR BLD CALC: 42.6 % — SIGNIFICANT CHANGE UP (ref 39–50)
HGB BLD-MCNC: 13.1 G/DL — SIGNIFICANT CHANGE UP (ref 13–17)
MAGNESIUM SERPL-MCNC: 2 MG/DL — SIGNIFICANT CHANGE UP (ref 1.6–2.6)
MCHC RBC-ENTMCNC: 29 PG — SIGNIFICANT CHANGE UP (ref 27–34)
MCHC RBC-ENTMCNC: 30.8 % — LOW (ref 32–36)
MCV RBC AUTO: 94.5 FL — SIGNIFICANT CHANGE UP (ref 80–100)
NRBC # FLD: 0 K/UL — SIGNIFICANT CHANGE UP (ref 0–0)
PHOSPHATE SERPL-MCNC: 3.3 MG/DL — SIGNIFICANT CHANGE UP (ref 2.5–4.5)
PLATELET # BLD AUTO: 396 K/UL — SIGNIFICANT CHANGE UP (ref 150–400)
PMV BLD: 8.9 FL — SIGNIFICANT CHANGE UP (ref 7–13)
POTASSIUM SERPL-MCNC: 4.7 MMOL/L — SIGNIFICANT CHANGE UP (ref 3.5–5.3)
POTASSIUM SERPL-SCNC: 4.7 MMOL/L — SIGNIFICANT CHANGE UP (ref 3.5–5.3)
RBC # BLD: 4.51 M/UL — SIGNIFICANT CHANGE UP (ref 4.2–5.8)
RBC # FLD: 13.8 % — SIGNIFICANT CHANGE UP (ref 10.3–14.5)
SODIUM SERPL-SCNC: 138 MMOL/L — SIGNIFICANT CHANGE UP (ref 135–145)
WBC # BLD: 8 K/UL — SIGNIFICANT CHANGE UP (ref 3.8–10.5)
WBC # FLD AUTO: 8 K/UL — SIGNIFICANT CHANGE UP (ref 3.8–10.5)

## 2020-06-20 PROCEDURE — 99233 SBSQ HOSP IP/OBS HIGH 50: CPT

## 2020-06-20 RX ORDER — INSULIN LISPRO 100/ML
VIAL (ML) SUBCUTANEOUS AT BEDTIME
Refills: 0 | Status: DISCONTINUED | OUTPATIENT
Start: 2020-06-20 | End: 2020-06-25

## 2020-06-20 RX ORDER — INSULIN LISPRO 100/ML
VIAL (ML) SUBCUTANEOUS
Refills: 0 | Status: DISCONTINUED | OUTPATIENT
Start: 2020-06-20 | End: 2020-06-25

## 2020-06-20 RX ADMIN — ENOXAPARIN SODIUM 40 MILLIGRAM(S): 100 INJECTION SUBCUTANEOUS at 12:04

## 2020-06-20 RX ADMIN — Medication 3 MILLILITER(S): at 04:35

## 2020-06-20 RX ADMIN — Medication 1 APPLICATION(S): at 17:51

## 2020-06-20 RX ADMIN — Medication 3 MILLILITER(S): at 23:07

## 2020-06-20 RX ADMIN — Medication 28.75 MILLIGRAM(S): at 21:51

## 2020-06-20 RX ADMIN — Medication 28.75 MILLIGRAM(S): at 12:04

## 2020-06-20 RX ADMIN — Medication 3 MILLILITER(S): at 15:57

## 2020-06-20 RX ADMIN — Medication 3 MILLILITER(S): at 10:47

## 2020-06-20 RX ADMIN — Medication 40 MILLIGRAM(S): at 12:04

## 2020-06-20 NOTE — PROGRESS NOTE ADULT - ATTENDING COMMENTS
patient is resting in bed- seen by Derm- needs better hygiene  Pod- out patient f/u- start Nizoral cream to feet. patient is resting in bed- seen by Derm- needs better hygiene  Pod- out patient f/u-  c/w solumedrol for copd exacerbation- 6/23 patient is resting in bed- seen by Derm- needs better hygiene  Pod- out patient f/u-  c/w solumedrol for copd exacerbation- 6/23    Called brother Dayana at [phone]- Patient becomes verbally abusive and has worn out his welcome with family- He may not have anywhere to go on d/c- Will ask SW for Help - ?? need Shelter.

## 2020-06-20 NOTE — PROGRESS NOTE ADULT - PROBLEM SELECTOR PLAN 2
Hx of schizoaffective disorder, home meds risperidone 1 mg BID, valproic acid 750 mg BID, fluphenazine 5 mg BID, and benztropine 0.5 mg BID   - Restart meds when able to tolerate PO   - Psych recs appreciated: depakote  BID  - qtc this AM was 522, will get repeat, if <500, will do risperidone 0.5mg BID  Psychiatry consult req

## 2020-06-20 NOTE — PROGRESS NOTE ADULT - PROBLEM SELECTOR PLAN 7
Dermatology consult= contact isolation  R/o Scabies??????- some one was concerned scabies bug was seen.  Derm requested  ?? severe tinea pedis - Seen by Podiatry- out patient follow up for toe nails Dermatology consult= contact isolation  R/o Scabies??????- some one was concerned scabies bug was seen.  Derm Saw patient and rec better hygene  Start Nizoral cream to feet  ?? severe tinea pedis - Seen by Podiatry- out patient follow up for toe nails

## 2020-06-20 NOTE — PROGRESS NOTE ADULT - ASSESSMENT
This is a 60 year old undomiciled man with history of schizoaffective disorder, HFrEF (3/2020 TTE EF24%, eccentric LVH) who presented with SOB and hypoxia. Admitted to MICU for hypercapnic respiratory failure requiring BiPAP, also started on Precedex given agitation.           #ID  - Does meet SIRS criteria, tachypneic and tachycardic, no source    - CXR bilateral linear and patchy opacities, which have increased compared to prior 3/2020, 2/2 pulmonary edema vs. infection, also small R pleural effusion    - COVID-19 Negative x1, will get repeat   - Will send urine legionella, blood cultures, U/A, urine culture, (thus far negative)  - will also check HIV, syphillis, and QuantiFeron gold given undomiciled status   - Will hold off on antibiotics at this time, start if clinical status worsens or patient spikes fever

## 2020-06-21 LAB
ANION GAP SERPL CALC-SCNC: 8 MMO/L — SIGNIFICANT CHANGE UP (ref 7–14)
BUN SERPL-MCNC: 12 MG/DL — SIGNIFICANT CHANGE UP (ref 7–23)
CALCIUM SERPL-MCNC: 9.1 MG/DL — SIGNIFICANT CHANGE UP (ref 8.4–10.5)
CHLORIDE SERPL-SCNC: 97 MMOL/L — LOW (ref 98–107)
CO2 SERPL-SCNC: 33 MMOL/L — HIGH (ref 22–31)
CREAT SERPL-MCNC: 0.69 MG/DL — SIGNIFICANT CHANGE UP (ref 0.5–1.3)
GLUCOSE BLDC GLUCOMTR-MCNC: 141 MG/DL — HIGH (ref 70–99)
GLUCOSE BLDC GLUCOMTR-MCNC: 161 MG/DL — HIGH (ref 70–99)
GLUCOSE BLDC GLUCOMTR-MCNC: 182 MG/DL — HIGH (ref 70–99)
GLUCOSE BLDC GLUCOMTR-MCNC: 205 MG/DL — HIGH (ref 70–99)
GLUCOSE SERPL-MCNC: 131 MG/DL — HIGH (ref 70–99)
HCT VFR BLD CALC: 44.2 % — SIGNIFICANT CHANGE UP (ref 39–50)
HGB BLD-MCNC: 13.5 G/DL — SIGNIFICANT CHANGE UP (ref 13–17)
MAGNESIUM SERPL-MCNC: 2 MG/DL — SIGNIFICANT CHANGE UP (ref 1.6–2.6)
MCHC RBC-ENTMCNC: 29 PG — SIGNIFICANT CHANGE UP (ref 27–34)
MCHC RBC-ENTMCNC: 30.5 % — LOW (ref 32–36)
MCV RBC AUTO: 94.8 FL — SIGNIFICANT CHANGE UP (ref 80–100)
NRBC # FLD: 0 K/UL — SIGNIFICANT CHANGE UP (ref 0–0)
PHOSPHATE SERPL-MCNC: 3.6 MG/DL — SIGNIFICANT CHANGE UP (ref 2.5–4.5)
PLATELET # BLD AUTO: 381 K/UL — SIGNIFICANT CHANGE UP (ref 150–400)
PMV BLD: 9.1 FL — SIGNIFICANT CHANGE UP (ref 7–13)
POTASSIUM SERPL-MCNC: 4 MMOL/L — SIGNIFICANT CHANGE UP (ref 3.5–5.3)
POTASSIUM SERPL-SCNC: 4 MMOL/L — SIGNIFICANT CHANGE UP (ref 3.5–5.3)
RBC # BLD: 4.66 M/UL — SIGNIFICANT CHANGE UP (ref 4.2–5.8)
RBC # FLD: 13.9 % — SIGNIFICANT CHANGE UP (ref 10.3–14.5)
SODIUM SERPL-SCNC: 138 MMOL/L — SIGNIFICANT CHANGE UP (ref 135–145)
VALPROATE SERPL-MCNC: 46.8 UG/ML — LOW (ref 50–100)
WBC # BLD: 8.22 K/UL — SIGNIFICANT CHANGE UP (ref 3.8–10.5)
WBC # FLD AUTO: 8.22 K/UL — SIGNIFICANT CHANGE UP (ref 3.8–10.5)

## 2020-06-21 PROCEDURE — 99233 SBSQ HOSP IP/OBS HIGH 50: CPT

## 2020-06-21 PROCEDURE — 93010 ELECTROCARDIOGRAM REPORT: CPT

## 2020-06-21 RX ADMIN — Medication 40 MILLIGRAM(S): at 05:55

## 2020-06-21 RX ADMIN — Medication 1 APPLICATION(S): at 05:54

## 2020-06-21 RX ADMIN — Medication 28.75 MILLIGRAM(S): at 08:49

## 2020-06-21 RX ADMIN — Medication 3 MILLILITER(S): at 15:36

## 2020-06-21 RX ADMIN — Medication 1 APPLICATION(S): at 17:43

## 2020-06-21 RX ADMIN — Medication 2: at 12:50

## 2020-06-21 RX ADMIN — Medication 3 MILLILITER(S): at 10:25

## 2020-06-21 RX ADMIN — Medication 28.75 MILLIGRAM(S): at 20:34

## 2020-06-21 RX ADMIN — Medication 1: at 18:10

## 2020-06-21 RX ADMIN — Medication 3 MILLILITER(S): at 04:22

## 2020-06-21 RX ADMIN — Medication 3 MILLILITER(S): at 22:23

## 2020-06-21 NOTE — PROGRESS NOTE ADULT - PROBLEM SELECTOR PLAN 7
Dermatology consult= contact isolation  R/o Scabies??????- some one was concerned scabies bug was seen.  Derm Saw patient and rec better hygiene and ammonium lactate cream bid  Start Nizoral cream to feet  ?? severe tinea pedis - Seen by Podiatry- out patient follow up for toe nails

## 2020-06-21 NOTE — PROGRESS NOTE ADULT - SUBJECTIVE AND OBJECTIVE BOX
Patient is a 60y old  Male who presents with a chief complaint of shortness of breath (21 Jun 2020 09:10)      SUBJECTIVE / OVERNIGHT EVENTS:    MEDICATIONS  (STANDING):  albuterol/ipratropium for Nebulization 3 milliLiter(s) Nebulizer every 6 hours  ammonium lactate 12% Lotion 1 Application(s) Topical two times a day  chlorhexidine 4% Liquid 1 Application(s) Topical <User Schedule>  dextrose 5%. 1000 milliLiter(s) (50 mL/Hr) IV Continuous <Continuous>  dextrose 50% Injectable 12.5 Gram(s) IV Push once  dextrose 50% Injectable 25 Gram(s) IV Push once  dextrose 50% Injectable 25 Gram(s) IV Push once  enoxaparin Injectable 40 milliGRAM(s) SubCutaneous daily  insulin lispro (HumaLOG) corrective regimen sliding scale   SubCutaneous three times a day before meals  insulin lispro (HumaLOG) corrective regimen sliding scale   SubCutaneous at bedtime  methylPREDNISolone sodium succinate Injectable 40 milliGRAM(s) IV Push daily  valproate sodium IVPB 750 milliGRAM(s) IV Intermittent every 12 hours    MEDICATIONS  (PRN):  dextrose 40% Gel 15 Gram(s) Oral once PRN Blood Glucose LESS THAN 70 milliGRAM(s)/deciliter  glucagon  Injectable 1 milliGRAM(s) IntraMuscular once PRN Glucose LESS THAN 70 milligrams/deciliter  LORazepam   Injectable 1 milliGRAM(s) IntraMuscular once PRN severe agitation        CAPILLARY BLOOD GLUCOSE      POCT Blood Glucose.: 182 mg/dL (21 Jun 2020 08:22)  POCT Blood Glucose.: 149 mg/dL (20 Jun 2020 21:48)  POCT Blood Glucose.: 197 mg/dL (20 Jun 2020 17:51)  POCT Blood Glucose.: 116 mg/dL (20 Jun 2020 11:46)    I&O's Summary    20 Jun 2020 07:01  -  21 Jun 2020 07:00  --------------------------------------------------------  IN: 2640 mL / OUT: 4200 mL / NET: -1560 mL    21 Jun 2020 07:01  -  21 Jun 2020 11:44  --------------------------------------------------------  IN: 420 mL / OUT: 750 mL / NET: -330 mL    Vital Signs Last 24 Hrs  T(C): 36.4 (21 Jun 2020 09:00), Max: 36.8 (20 Jun 2020 18:23)  T(F): 97.6 (21 Jun 2020 09:00), Max: 98.3 (20 Jun 2020 18:23)  HR: 103 (21 Jun 2020 09:00) (78 - 117)  BP: 129/76 (21 Jun 2020 09:00) (123/76 - 139/78)  BP(mean): --  RR: 18 (21 Jun 2020 09:00) (18 - 18)  SpO2: 96% (21 Jun 2020 09:00) (91% - 99%)    PHYSICAL EXAM:  GENERAL: NAD, well-developed  HEAD:  Atraumatic, Normocephalic  EYES: EOMI, PERRLA, conjunctiva and sclera clear  NECK: Supple, No JVD  CHEST/LUNG: Clear to auscultation bilaterally; No wheeze  HEART: Regular rate and rhythm; No murmurs, rubs, or gallops  ABDOMEN: Soft, Nontender, Nondistended; Bowel sounds present  EXTREMITIES:  2+ Peripheral Pulses, No clubbing, cyanosis, or edema  PSYCH: alert  NEUROLOGY: non-focal  SKIN: toe nail thickening and excoriation to skin of both toes and feet    LABS:                        13.5   8.22  )-----------( 381      ( 21 Jun 2020 06:40 )             44.2     06-21    138  |  97<L>  |  12  ----------------------------<  131<H>  4.0   |  33<H>  |  0.69    Ca    9.1      21 Jun 2020 06:40  Phos  3.6     06-21  Mg     2.0     06-21          RADIOLOGY & ADDITIONAL TESTS:  < from: Xray Chest 1 View-PORTABLE IMMEDIATE (06.17.20 @ 23:16) >  Bilateral linear and patchy opacities, which have increased compared to prior. These findings are consistent with pulmonary edema. Trace right pleural effusion.  No pneumothorax.  Cardiac size cannot accurately be assessed inthis projection.   IMPRESSION: CHF. Patient is a 60y old  Male who presents with a chief complaint of shortness of breath (21 Jun 2020 09:10)      SUBJECTIVE / OVERNIGHT EVENTS:  Patient wants the urinal and snacks    MEDICATIONS  (STANDING):  albuterol/ipratropium for Nebulization 3 milliLiter(s) Nebulizer every 6 hours  ammonium lactate 12% Lotion 1 Application(s) Topical two times a day  chlorhexidine 4% Liquid 1 Application(s) Topical <User Schedule>  dextrose 5%. 1000 milliLiter(s) (50 mL/Hr) IV Continuous <Continuous>  dextrose 50% Injectable 12.5 Gram(s) IV Push once  dextrose 50% Injectable 25 Gram(s) IV Push once  dextrose 50% Injectable 25 Gram(s) IV Push once  enoxaparin Injectable 40 milliGRAM(s) SubCutaneous daily  insulin lispro (HumaLOG) corrective regimen sliding scale   SubCutaneous three times a day before meals  insulin lispro (HumaLOG) corrective regimen sliding scale   SubCutaneous at bedtime  methylPREDNISolone sodium succinate Injectable 40 milliGRAM(s) IV Push daily  valproate sodium IVPB 750 milliGRAM(s) IV Intermittent every 12 hours    MEDICATIONS  (PRN):  dextrose 40% Gel 15 Gram(s) Oral once PRN Blood Glucose LESS THAN 70 milliGRAM(s)/deciliter  glucagon  Injectable 1 milliGRAM(s) IntraMuscular once PRN Glucose LESS THAN 70 milligrams/deciliter  LORazepam   Injectable 1 milliGRAM(s) IntraMuscular once PRN severe agitation        CAPILLARY BLOOD GLUCOSE      POCT Blood Glucose.: 182 mg/dL (21 Jun 2020 08:22)  POCT Blood Glucose.: 149 mg/dL (20 Jun 2020 21:48)  POCT Blood Glucose.: 197 mg/dL (20 Jun 2020 17:51)  POCT Blood Glucose.: 116 mg/dL (20 Jun 2020 11:46)    I&O's Summary    20 Jun 2020 07:01  -  21 Jun 2020 07:00  --------------------------------------------------------  IN: 2640 mL / OUT: 4200 mL / NET: -1560 mL    21 Jun 2020 07:01  -  21 Jun 2020 11:44  --------------------------------------------------------  IN: 420 mL / OUT: 750 mL / NET: -330 mL    Vital Signs Last 24 Hrs  T(C): 36.4 (21 Jun 2020 09:00), Max: 36.8 (20 Jun 2020 18:23)  T(F): 97.6 (21 Jun 2020 09:00), Max: 98.3 (20 Jun 2020 18:23)  HR: 103 (21 Jun 2020 09:00) (78 - 117)  BP: 129/76 (21 Jun 2020 09:00) (123/76 - 139/78)  BP(mean): --  RR: 18 (21 Jun 2020 09:00) (18 - 18)  SpO2: 96% (21 Jun 2020 09:00) (91% - 99%)    PHYSICAL EXAM:  GENERAL: NAD, well-developed  HEAD:  Atraumatic, Normocephalic  EYES: EOMI, PERRLA, conjunctiva and sclera clear  NECK: Supple, No JVD  CHEST/LUNG: Clear to auscultation bilaterally; No wheeze  HEART: Regular rate and rhythm; No murmurs, rubs, or gallops  ABDOMEN: Soft, Nontender, Nondistended; Bowel sounds present  EXTREMITIES:  2+ Peripheral Pulses, No clubbing, cyanosis, or edema  PSYCH: alert  NEUROLOGY: non-focal  SKIN: toe nail thickening and excoriation to skin of both toes and feet    LABS:                        13.5   8.22  )-----------( 381      ( 21 Jun 2020 06:40 )             44.2     06-21    138  |  97<L>  |  12  ----------------------------<  131<H>  4.0   |  33<H>  |  0.69    Ca    9.1      21 Jun 2020 06:40  Phos  3.6     06-21  Mg     2.0     06-21          RADIOLOGY & ADDITIONAL TESTS:  < from: Xray Chest 1 View-PORTABLE IMMEDIATE (06.17.20 @ 23:16) >  Bilateral linear and patchy opacities, which have increased compared to prior. These findings are consistent with pulmonary edema. Trace right pleural effusion.  No pneumothorax.  Cardiac size cannot accurately be assessed inthis projection.   IMPRESSION: CHF.

## 2020-06-21 NOTE — PROGRESS NOTE ADULT - ASSESSMENT
This is a 60 year old undomiciled man with history of schizoaffective disorder, HFrEF (3/2020 TTE EF24%, eccentric LVH) who presented with SOB and hypoxia. Admitted to MICU for hypercapnic respiratory failure requiring BiPAP, also started on Precedex given agitation.   # CHF- treated in MICU- completed lasix- f/u Pro bmp  #COPD ex- improved dyspnea- complete steroids 6/23  # Abn lung nodules- r/o malig- repeat ct of lungs   #Le dermatitis- Ammonium lactate cream-Seen by Derm1) Dermatosis neglecta-2/2 poor hygiene/inadequate cleaning of the skin, resulting in accumulation of sebum, sweat, keratin, debris on unwashed areas of the skin.-No evidence of infestation  -Gently scrub the affected area with soap and water twice daily using a washcloth. Continue until hyperpigmentation and scaling resolve.  -start ammonium lactate cream or lotion BID to affected area.  # agitation improved with psych treatment. This is a 60 year old undomiciled man with history of schizoaffective disorder, HFrEF (3/2020 TTE EF24%, eccentric LVH) who presented with SOB and hypoxia. Admitted to MICU for hypercapnic respiratory failure requiring BiPAP, also started on Precedex given agitation.   # CHF- treated in MICU- completed lasix- f/u Pro bmp  #COPD ex- improved dyspnea- complete steroids 6/23  # Abn lung nodules- r/o malig- repeat ct of lungs once off airborne and contact isolation  #Le dermatitis- Ammonium lactate cream-Seen by Derm1) Dermatosis neglecta-2/2 poor hygiene/inadequate cleaning of the skin, resulting in accumulation of sebum, sweat, keratin, debris on unwashed areas of the skin.-No evidence of infestation  -Gently scrub the affected area with soap and water twice daily using a washcloth. Continue until hyperpigmentation and scaling resolve.  -start ammonium lactate cream or lotion BID to affected area.  # agitation improved with psych treatment.

## 2020-06-21 NOTE — PROGRESS NOTE ADULT - ATTENDING COMMENTS
Solumedrol until 6/23 Solumedrol until 6/23  Repeat ct of chest to asses nodules on CXR  Probmp f/u chf  SW to help- Brother notes family may not want him home Solumedrol until 6/23  Repeat ct of chest to asses nodules on CXR- once off airborn and contact isolation.  Probmp f/u chf  SW to help- Brother notes family may not want him home

## 2020-06-22 DIAGNOSIS — J96.02 ACUTE RESPIRATORY FAILURE WITH HYPERCAPNIA: ICD-10-CM

## 2020-06-22 DIAGNOSIS — Z02.9 ENCOUNTER FOR ADMINISTRATIVE EXAMINATIONS, UNSPECIFIED: ICD-10-CM

## 2020-06-22 LAB
ANION GAP SERPL CALC-SCNC: 10 MMO/L — SIGNIFICANT CHANGE UP (ref 7–14)
BUN SERPL-MCNC: 17 MG/DL — SIGNIFICANT CHANGE UP (ref 7–23)
CALCIUM SERPL-MCNC: 9.2 MG/DL — SIGNIFICANT CHANGE UP (ref 8.4–10.5)
CHLORIDE SERPL-SCNC: 95 MMOL/L — LOW (ref 98–107)
CO2 SERPL-SCNC: 32 MMOL/L — HIGH (ref 22–31)
CREAT SERPL-MCNC: 0.81 MG/DL — SIGNIFICANT CHANGE UP (ref 0.5–1.3)
GLUCOSE BLDC GLUCOMTR-MCNC: 135 MG/DL — HIGH (ref 70–99)
GLUCOSE BLDC GLUCOMTR-MCNC: 146 MG/DL — HIGH (ref 70–99)
GLUCOSE BLDC GLUCOMTR-MCNC: 212 MG/DL — HIGH (ref 70–99)
GLUCOSE SERPL-MCNC: 208 MG/DL — HIGH (ref 70–99)
HCT VFR BLD CALC: 44 % — SIGNIFICANT CHANGE UP (ref 39–50)
HGB BLD-MCNC: 13.7 G/DL — SIGNIFICANT CHANGE UP (ref 13–17)
MAGNESIUM SERPL-MCNC: 1.7 MG/DL — SIGNIFICANT CHANGE UP (ref 1.6–2.6)
MCHC RBC-ENTMCNC: 29 PG — SIGNIFICANT CHANGE UP (ref 27–34)
MCHC RBC-ENTMCNC: 31.1 % — LOW (ref 32–36)
MCV RBC AUTO: 93 FL — SIGNIFICANT CHANGE UP (ref 80–100)
NRBC # FLD: 0 K/UL — SIGNIFICANT CHANGE UP (ref 0–0)
NT-PROBNP SERPL-SCNC: 4037 PG/ML — SIGNIFICANT CHANGE UP
PHOSPHATE SERPL-MCNC: 3.3 MG/DL — SIGNIFICANT CHANGE UP (ref 2.5–4.5)
PLATELET # BLD AUTO: 375 K/UL — SIGNIFICANT CHANGE UP (ref 150–400)
PMV BLD: 9 FL — SIGNIFICANT CHANGE UP (ref 7–13)
POTASSIUM SERPL-MCNC: 4.1 MMOL/L — SIGNIFICANT CHANGE UP (ref 3.5–5.3)
POTASSIUM SERPL-SCNC: 4.1 MMOL/L — SIGNIFICANT CHANGE UP (ref 3.5–5.3)
RBC # BLD: 4.73 M/UL — SIGNIFICANT CHANGE UP (ref 4.2–5.8)
RBC # FLD: 13.6 % — SIGNIFICANT CHANGE UP (ref 10.3–14.5)
SODIUM SERPL-SCNC: 137 MMOL/L — SIGNIFICANT CHANGE UP (ref 135–145)
WBC # BLD: 9.61 K/UL — SIGNIFICANT CHANGE UP (ref 3.8–10.5)
WBC # FLD AUTO: 9.61 K/UL — SIGNIFICANT CHANGE UP (ref 3.8–10.5)

## 2020-06-22 PROCEDURE — 99233 SBSQ HOSP IP/OBS HIGH 50: CPT

## 2020-06-22 PROCEDURE — 93010 ELECTROCARDIOGRAM REPORT: CPT

## 2020-06-22 RX ORDER — ASPIRIN/CALCIUM CARB/MAGNESIUM 324 MG
81 TABLET ORAL DAILY
Refills: 0 | Status: DISCONTINUED | OUTPATIENT
Start: 2020-06-22 | End: 2020-06-25

## 2020-06-22 RX ORDER — ALBUTEROL 90 UG/1
2 AEROSOL, METERED ORAL EVERY 6 HOURS
Refills: 0 | Status: DISCONTINUED | OUTPATIENT
Start: 2020-06-22 | End: 2020-06-25

## 2020-06-22 RX ORDER — MAGNESIUM SULFATE 500 MG/ML
1 VIAL (ML) INJECTION ONCE
Refills: 0 | Status: COMPLETED | OUTPATIENT
Start: 2020-06-22 | End: 2020-06-22

## 2020-06-22 RX ORDER — RISPERIDONE 4 MG/1
0.5 TABLET ORAL
Refills: 0 | Status: DISCONTINUED | OUTPATIENT
Start: 2020-06-22 | End: 2020-06-25

## 2020-06-22 RX ORDER — METOPROLOL TARTRATE 50 MG
25 TABLET ORAL DAILY
Refills: 0 | Status: DISCONTINUED | OUTPATIENT
Start: 2020-06-22 | End: 2020-06-25

## 2020-06-22 RX ORDER — FUROSEMIDE 40 MG
20 TABLET ORAL DAILY
Refills: 0 | Status: DISCONTINUED | OUTPATIENT
Start: 2020-06-22 | End: 2020-06-25

## 2020-06-22 RX ORDER — SOD,AMMONIUM,POTASSIUM LACTATE
1 CREAM (GRAM) TOPICAL
Refills: 0 | Status: DISCONTINUED | OUTPATIENT
Start: 2020-06-22 | End: 2020-06-25

## 2020-06-22 RX ORDER — LISINOPRIL 2.5 MG/1
2.5 TABLET ORAL DAILY
Refills: 0 | Status: DISCONTINUED | OUTPATIENT
Start: 2020-06-22 | End: 2020-06-25

## 2020-06-22 RX ORDER — ATORVASTATIN CALCIUM 80 MG/1
40 TABLET, FILM COATED ORAL AT BEDTIME
Refills: 0 | Status: DISCONTINUED | OUTPATIENT
Start: 2020-06-22 | End: 2020-06-25

## 2020-06-22 RX ORDER — VALPROIC ACID (AS SODIUM SALT) 250 MG/5ML
750 SOLUTION, ORAL ORAL
Refills: 0 | Status: DISCONTINUED | OUTPATIENT
Start: 2020-06-22 | End: 2020-06-25

## 2020-06-22 RX ADMIN — Medication 3 MILLILITER(S): at 04:35

## 2020-06-22 RX ADMIN — Medication 750 MILLIGRAM(S): at 21:09

## 2020-06-22 RX ADMIN — ENOXAPARIN SODIUM 40 MILLIGRAM(S): 100 INJECTION SUBCUTANEOUS at 12:46

## 2020-06-22 RX ADMIN — Medication 100 GRAM(S): at 10:38

## 2020-06-22 RX ADMIN — Medication 28.75 MILLIGRAM(S): at 09:18

## 2020-06-22 RX ADMIN — Medication 20 MILLIGRAM(S): at 17:55

## 2020-06-22 RX ADMIN — Medication 81 MILLIGRAM(S): at 17:55

## 2020-06-22 RX ADMIN — Medication 40 MILLIGRAM(S): at 05:01

## 2020-06-22 RX ADMIN — Medication 2: at 13:11

## 2020-06-22 RX ADMIN — Medication 1 APPLICATION(S): at 05:00

## 2020-06-22 RX ADMIN — Medication 3 MILLILITER(S): at 11:01

## 2020-06-22 RX ADMIN — Medication 25 MILLIGRAM(S): at 17:55

## 2020-06-22 RX ADMIN — ATORVASTATIN CALCIUM 40 MILLIGRAM(S): 80 TABLET, FILM COATED ORAL at 21:10

## 2020-06-22 RX ADMIN — Medication 1 APPLICATION(S): at 17:54

## 2020-06-22 RX ADMIN — LISINOPRIL 2.5 MILLIGRAM(S): 2.5 TABLET ORAL at 17:55

## 2020-06-22 NOTE — PROGRESS NOTE ADULT - ASSESSMENT
This is a 60 year old undomiciled man with history of schizoaffective disorder, HFrEF (3/2020 TTE EF24%, eccentric LVH) who presented with SOB and hypoxia. Admitted to MICU for hypercapnic respiratory failure requiring BiPAP, also started on Precedex given agitation.   # CHF- treated in MICU- completed lasix- f/u Pro bmp  #COPD ex- improved dyspnea- complete steroids 6/23  # Abn lung nodules- r/o malig- repeat ct of lungs once off airborne and contact isolation  #Le dermatitis- Ammonium lactate cream-Seen by Derm1) Dermatosis neglecta-2/2 poor hygiene/inadequate cleaning of the skin, resulting in accumulation of sebum, sweat, keratin, debris on unwashed areas of the skin.-No evidence of infestation  -Gently scrub the affected area with soap and water twice daily using a washcloth. Continue until hyperpigmentation and scaling resolve.  -start ammonium lactate cream or lotion BID to affected area.  # agitation improved with psych treatment.

## 2020-06-22 NOTE — PROGRESS NOTE BEHAVIORAL HEALTH - NSBHCHARTREVIEWLAB_PSY_A_CORE FT
CBC Full  -  ( 22 Jun 2020 06:50 )  WBC Count : 9.61 K/uL  RBC Count : 4.73 M/uL  Hemoglobin : 13.7 g/dL  Hematocrit : 44.0 %  Platelet Count - Automated : 375 K/uL  Mean Cell Volume : 93.0 fL  Mean Cell Hemoglobin : 29.0 pg  Mean Cell Hemoglobin Concentration : 31.1 %  Auto Neutrophil # : x  Auto Lymphocyte # : x  Auto Monocyte # : x  Auto Eosinophil # : x  Auto Basophil # : x  Auto Neutrophil % : x  Auto Lymphocyte % : x  Auto Monocyte % : x  Auto Eosinophil % : x  Auto Basophil % : x  06-22    137  |  95<L>  |  17  ----------------------------<  208<H>  4.1   |  32<H>  |  0.81    Ca    9.2      22 Jun 2020 06:50  Phos  3.3     06-22  Mg     1.7     06-22

## 2020-06-22 NOTE — PROGRESS NOTE ADULT - SUBJECTIVE AND OBJECTIVE BOX
Odalis Matute  [facility]  Pager #[phone]    Patient is a 60y old  Male who presents with a chief complaint of shortness of breath (21 Jun 2020 09:10)      SUBJECTIVE / OVERNIGHT EVENTS: Patient seen and examined at bedside. Denies SOB or CP. He has some itching on his arms where it is dry. He is worried about where he will go after he is discharged because he did not like the shelter he was staying at.    ADDITIONAL REVIEW OF SYSTEMS:    MEDICATIONS  (STANDING):  albuterol/ipratropium for Nebulization 3 milliLiter(s) Nebulizer every 6 hours  ammonium lactate 12% Lotion 1 Application(s) Topical two times a day  chlorhexidine 4% Liquid 1 Application(s) Topical <User Schedule>  dextrose 5%. 1000 milliLiter(s) (50 mL/Hr) IV Continuous <Continuous>  dextrose 50% Injectable 12.5 Gram(s) IV Push once  dextrose 50% Injectable 25 Gram(s) IV Push once  dextrose 50% Injectable 25 Gram(s) IV Push once  enoxaparin Injectable 40 milliGRAM(s) SubCutaneous daily  furosemide    Tablet 20 milliGRAM(s) Oral daily  insulin lispro (HumaLOG) corrective regimen sliding scale   SubCutaneous three times a day before meals  insulin lispro (HumaLOG) corrective regimen sliding scale   SubCutaneous at bedtime  lisinopril 2.5 milliGRAM(s) Oral daily  magnesium sulfate  IVPB 1 Gram(s) IV Intermittent once  metoprolol succinate ER 25 milliGRAM(s) Oral daily  valproate sodium IVPB 750 milliGRAM(s) IV Intermittent every 12 hours  valproic acid 750 milliGRAM(s) Oral two times a day    MEDICATIONS  (PRN):  dextrose 40% Gel 15 Gram(s) Oral once PRN Blood Glucose LESS THAN 70 milliGRAM(s)/deciliter  glucagon  Injectable 1 milliGRAM(s) IntraMuscular once PRN Glucose LESS THAN 70 milligrams/deciliter  LORazepam   Injectable 1 milliGRAM(s) IntraMuscular once PRN severe agitation      CAPILLARY BLOOD GLUCOSE      POCT Blood Glucose.: 212 mg/dL (22 Jun 2020 12:52)  POCT Blood Glucose.: 141 mg/dL (21 Jun 2020 21:34)  POCT Blood Glucose.: 161 mg/dL (21 Jun 2020 18:06)    I&O's Summary    21 Jun 2020 07:01  -  22 Jun 2020 07:00  --------------------------------------------------------  IN: 1930 mL / OUT: 5000 mL / NET: -3070 mL        PHYSICAL EXAM:    Vital Signs Last 24 Hrs  T(C): 36.5 (22 Jun 2020 12:57), Max: 36.8 (22 Jun 2020 04:56)  T(F): 97.7 (22 Jun 2020 12:57), Max: 98.2 (22 Jun 2020 04:56)  HR: 112 (22 Jun 2020 12:57) (101 - 125)  BP: 130/81 (22 Jun 2020 12:57) (109/79 - 136/76)  BP(mean): --  RR: 18 (22 Jun 2020 12:57) (18 - 18)  SpO2: 98% (22 Jun 2020 12:57) (92% - 98%)    CONSTITUTIONAL: NAD, well-developed, unkempt, malodorous   EYES: Conjunctiva and sclera clear  RESPIRATORY: Normal respiratory effort; lungs are clear to auscultation bilaterally  CARDIOVASCULAR: Regular rate and rhythm, normal S1 and S2, no murmur/rub/gallop; No lower extremity edema  ABDOMEN: Nontender to palpation, normoactive bowel sounds, no rebound/guarding  MUSCULOSKELETAL: no joint swelling or tenderness to palpation  PSYCH: A+O to person, place, and time  NEUROLOGY: No focal deficits  SKIN: Dry skin with flaking over b/l feet and ankles. Dry skin noted throughout body    LABS:                        13.7   9.61  )-----------( 375      ( 22 Jun 2020 06:50 )             44.0     06-22    137  |  95<L>  |  17  ----------------------------<  208<H>  4.1   |  32<H>  |  0.81    Ca    9.2      22 Jun 2020 06:50  Phos  3.3     06-22  Mg     1.7     06-22      RADIOLOGY & ADDITIONAL TESTS: No new imaging  Results Reviewed: Yes  Imaging Personally Reviewed:  Electrocardiogram Personally Reviewed:    COORDINATION OF CARE:  Care Discussed with Consultants/Other Providers [Y/N]:  Prior or Outpatient Records Reviewed [Y/N]:

## 2020-06-22 NOTE — PROGRESS NOTE BEHAVIORAL HEALTH - NSBHCHARTREVIEWVS_PSY_A_CORE FT
Vital Signs Last 24 Hrs  T(C): 36.8 (22 Jun 2020 08:10), Max: 36.8 (22 Jun 2020 04:56)  T(F): 98.2 (22 Jun 2020 08:10), Max: 98.2 (22 Jun 2020 04:56)  HR: 111 (22 Jun 2020 11:03) (101 - 125)  BP: 110/75 (22 Jun 2020 08:10) (109/79 - 136/76)  BP(mean): --  RR: 18 (22 Jun 2020 08:10) (18 - 18)  SpO2: 98% (22 Jun 2020 11:03) (92% - 98%)

## 2020-06-22 NOTE — PROGRESS NOTE BEHAVIORAL HEALTH - NSBHFUPINTERVALHXFT_PSY_A_CORE
Met with the patient. Much calmer than last week, TP is more goal directed, is less talkative, and speech is more logical. Not irritable today. Denies any mood symptoms when asked, denies any si or hi, denies any a/vh or overt paranoia. Records indicate that at baseline patient has suspicions and paranoia.   Patient is oriented x 3, and states that he is looking to speak with SW regarding 'going somewhere to live'.

## 2020-06-22 NOTE — PROGRESS NOTE ADULT - PROBLEM SELECTOR PLAN 2
C/w lisinopril 2.5 mg, lasix 20 mg PO, and metoprolol succinate 25 mg  - Restart ASA 81 and atorvastatin 40  - EKG showed , will get repeat today

## 2020-06-23 LAB
ANION GAP SERPL CALC-SCNC: 10 MMO/L — SIGNIFICANT CHANGE UP (ref 7–14)
BUN SERPL-MCNC: 19 MG/DL — SIGNIFICANT CHANGE UP (ref 7–23)
CALCIUM SERPL-MCNC: 9.5 MG/DL — SIGNIFICANT CHANGE UP (ref 8.4–10.5)
CHLORIDE SERPL-SCNC: 94 MMOL/L — LOW (ref 98–107)
CO2 SERPL-SCNC: 36 MMOL/L — HIGH (ref 22–31)
CREAT SERPL-MCNC: 0.79 MG/DL — SIGNIFICANT CHANGE UP (ref 0.5–1.3)
CULTURE RESULTS: SIGNIFICANT CHANGE UP
CULTURE RESULTS: SIGNIFICANT CHANGE UP
GLUCOSE BLDC GLUCOMTR-MCNC: 106 MG/DL — HIGH (ref 70–99)
GLUCOSE BLDC GLUCOMTR-MCNC: 112 MG/DL — HIGH (ref 70–99)
GLUCOSE BLDC GLUCOMTR-MCNC: 92 MG/DL — SIGNIFICANT CHANGE UP (ref 70–99)
GLUCOSE BLDC GLUCOMTR-MCNC: 95 MG/DL — SIGNIFICANT CHANGE UP (ref 70–99)
GLUCOSE SERPL-MCNC: 111 MG/DL — HIGH (ref 70–99)
HCT VFR BLD CALC: 48 % — SIGNIFICANT CHANGE UP (ref 39–50)
HGB BLD-MCNC: 14.7 G/DL — SIGNIFICANT CHANGE UP (ref 13–17)
MAGNESIUM SERPL-MCNC: 1.9 MG/DL — SIGNIFICANT CHANGE UP (ref 1.6–2.6)
MCHC RBC-ENTMCNC: 28.7 PG — SIGNIFICANT CHANGE UP (ref 27–34)
MCHC RBC-ENTMCNC: 30.6 % — LOW (ref 32–36)
MCV RBC AUTO: 93.6 FL — SIGNIFICANT CHANGE UP (ref 80–100)
NRBC # FLD: 0 K/UL — SIGNIFICANT CHANGE UP (ref 0–0)
PHOSPHATE SERPL-MCNC: 2.8 MG/DL — SIGNIFICANT CHANGE UP (ref 2.5–4.5)
PLATELET # BLD AUTO: 396 K/UL — SIGNIFICANT CHANGE UP (ref 150–400)
PMV BLD: 8.9 FL — SIGNIFICANT CHANGE UP (ref 7–13)
POTASSIUM SERPL-MCNC: 3.4 MMOL/L — LOW (ref 3.5–5.3)
POTASSIUM SERPL-SCNC: 3.4 MMOL/L — LOW (ref 3.5–5.3)
RBC # BLD: 5.13 M/UL — SIGNIFICANT CHANGE UP (ref 4.2–5.8)
RBC # FLD: 13.9 % — SIGNIFICANT CHANGE UP (ref 10.3–14.5)
SODIUM SERPL-SCNC: 140 MMOL/L — SIGNIFICANT CHANGE UP (ref 135–145)
SPECIMEN SOURCE: SIGNIFICANT CHANGE UP
SPECIMEN SOURCE: SIGNIFICANT CHANGE UP
WBC # BLD: 6.9 K/UL — SIGNIFICANT CHANGE UP (ref 3.8–10.5)
WBC # FLD AUTO: 6.9 K/UL — SIGNIFICANT CHANGE UP (ref 3.8–10.5)

## 2020-06-23 PROCEDURE — 71250 CT THORAX DX C-: CPT | Mod: 26

## 2020-06-23 PROCEDURE — 99233 SBSQ HOSP IP/OBS HIGH 50: CPT

## 2020-06-23 RX ORDER — POTASSIUM CHLORIDE 20 MEQ
20 PACKET (EA) ORAL
Refills: 0 | Status: COMPLETED | OUTPATIENT
Start: 2020-06-23 | End: 2020-06-23

## 2020-06-23 RX ADMIN — Medication 20 MILLIEQUIVALENT(S): at 15:35

## 2020-06-23 RX ADMIN — Medication 20 MILLIEQUIVALENT(S): at 15:47

## 2020-06-23 RX ADMIN — ATORVASTATIN CALCIUM 40 MILLIGRAM(S): 80 TABLET, FILM COATED ORAL at 21:25

## 2020-06-23 RX ADMIN — Medication 20 MILLIGRAM(S): at 05:56

## 2020-06-23 RX ADMIN — Medication 81 MILLIGRAM(S): at 12:49

## 2020-06-23 RX ADMIN — Medication 1 APPLICATION(S): at 15:47

## 2020-06-23 RX ADMIN — Medication 1 APPLICATION(S): at 05:56

## 2020-06-23 RX ADMIN — RISPERIDONE 0.5 MILLIGRAM(S): 4 TABLET ORAL at 05:56

## 2020-06-23 RX ADMIN — Medication 750 MILLIGRAM(S): at 15:47

## 2020-06-23 RX ADMIN — LISINOPRIL 2.5 MILLIGRAM(S): 2.5 TABLET ORAL at 05:56

## 2020-06-23 RX ADMIN — Medication 750 MILLIGRAM(S): at 05:55

## 2020-06-23 RX ADMIN — Medication 25 MILLIGRAM(S): at 05:55

## 2020-06-23 RX ADMIN — RISPERIDONE 0.5 MILLIGRAM(S): 4 TABLET ORAL at 19:52

## 2020-06-23 RX ADMIN — ENOXAPARIN SODIUM 40 MILLIGRAM(S): 100 INJECTION SUBCUTANEOUS at 12:49

## 2020-06-23 NOTE — PROGRESS NOTE ADULT - PROBLEM SELECTOR PLAN 2
C/w lisinopril 2.5 mg, Lasix 20 mg PO, and metoprolol succinate 25 mg. Appears euvolemic on exam  - C/w ASA 81 and atorvastatin 40  - EKG repeat with QTc<500ms

## 2020-06-23 NOTE — PROGRESS NOTE ADULT - SUBJECTIVE AND OBJECTIVE BOX
Odalis Matute  [facility]  Pager #[phone]    Patient is a 60y old  Male who presents with a chief complaint of shortness of breath (22 Jun 2020 13:33)      SUBJECTIVE / OVERNIGHT EVENTS: Patient seen and examined at bedside. Patient resting comfortably, feels hungry and is asking for more food. Denies cough, SOB or CP. Has some mild nasal congestion.     ADDITIONAL REVIEW OF SYSTEMS:    MEDICATIONS  (STANDING):  ammonium lactate 12% Lotion 1 Application(s) Topical two times a day  aspirin enteric coated 81 milliGRAM(s) Oral daily  atorvastatin 40 milliGRAM(s) Oral at bedtime  chlorhexidine 4% Liquid 1 Application(s) Topical <User Schedule>  dextrose 5%. 1000 milliLiter(s) (50 mL/Hr) IV Continuous <Continuous>  dextrose 50% Injectable 12.5 Gram(s) IV Push once  dextrose 50% Injectable 25 Gram(s) IV Push once  dextrose 50% Injectable 25 Gram(s) IV Push once  enoxaparin Injectable 40 milliGRAM(s) SubCutaneous daily  furosemide    Tablet 20 milliGRAM(s) Oral daily  insulin lispro (HumaLOG) corrective regimen sliding scale   SubCutaneous three times a day before meals  insulin lispro (HumaLOG) corrective regimen sliding scale   SubCutaneous at bedtime  lisinopril 2.5 milliGRAM(s) Oral daily  metoprolol succinate ER 25 milliGRAM(s) Oral daily  risperiDONE   Tablet 0.5 milliGRAM(s) Oral two times a day  valproic acid 750 milliGRAM(s) Oral two times a day    MEDICATIONS  (PRN):  ALBUTerol    90 MICROgram(s) HFA Inhaler 2 Puff(s) Inhalation every 6 hours PRN Shortness of Breath and/or Wheezing  dextrose 40% Gel 15 Gram(s) Oral once PRN Blood Glucose LESS THAN 70 milliGRAM(s)/deciliter  glucagon  Injectable 1 milliGRAM(s) IntraMuscular once PRN Glucose LESS THAN 70 milligrams/deciliter      CAPILLARY BLOOD GLUCOSE      POCT Blood Glucose.: 106 mg/dL (23 Jun 2020 12:02)  POCT Blood Glucose.: 92 mg/dL (23 Jun 2020 08:32)  POCT Blood Glucose.: 135 mg/dL (22 Jun 2020 21:51)  POCT Blood Glucose.: 146 mg/dL (22 Jun 2020 18:19)  POCT Blood Glucose.: 212 mg/dL (22 Jun 2020 12:52)    I&O's Summary    22 Jun 2020 07:01  -  23 Jun 2020 07:00  --------------------------------------------------------  IN: 390 mL / OUT: 3600 mL / NET: -3210 mL    23 Jun 2020 07:01  -  23 Jun 2020 12:26  --------------------------------------------------------  IN: 0 mL / OUT: 700 mL / NET: -700 mL        PHYSICAL EXAM:    Vital Signs Last 24 Hrs  T(C): 36.6 (23 Jun 2020 12:09), Max: 36.9 (23 Jun 2020 00:25)  T(F): 97.8 (23 Jun 2020 12:09), Max: 98.4 (23 Jun 2020 00:25)  HR: 119 (23 Jun 2020 12:09) (91 - 119)  BP: 97/57 (23 Jun 2020 12:09) (97/57 - 130/81)  BP(mean): --  RR: 17 (23 Jun 2020 12:09) (17 - 18)  SpO2: 98% (23 Jun 2020 12:09) (95% - 100%)    CONSTITUTIONAL: NAD, well-developed, unkempt, malodorous   EYES: Conjunctiva and sclera clear  RESPIRATORY: Normal respiratory effort; lungs are clear to auscultation bilaterally  CARDIOVASCULAR: Regular rate and rhythm, normal S1 and S2, no murmur/rub/gallop; No lower extremity edema  ABDOMEN: Nontender to palpation, normoactive bowel sounds, no rebound/guarding  MUSCULOSKELETAL: no joint swelling or tenderness to palpation  PSYCH: A+O to person, place, and time  NEUROLOGY: No focal deficits  SKIN: Dry skin with flaking over b/l feet and ankles. Dry skin noted throughout body    LABS:                        14.7   6.90  )-----------( 396      ( 23 Jun 2020 11:45 )             48.0     06-22    137  |  95<L>  |  17  ----------------------------<  208<H>  4.1   |  32<H>  |  0.81    Ca    9.2      22 Jun 2020 06:50  Phos  3.3     06-22  Mg     1.7     06-22      RADIOLOGY & ADDITIONAL TESTS: No new imaging  Results Reviewed: Yes  Imaging Personally Reviewed:  Electrocardiogram Personally Reviewed:    COORDINATION OF CARE:  Care Discussed with Consultants/Other Providers [Y/N]:  Prior or Outpatient Records Reviewed [Y/N]:

## 2020-06-23 NOTE — PROGRESS NOTE ADULT - ASSESSMENT
60 year old undomiciled man with history of schizoaffective disorder, HFrEF (3/2020 TTE EF24%, eccentric LVH) who presented with SOB and hypoxia. Admitted to MICU for hypercapnic respiratory failure requiring BiPAP

## 2020-06-24 DIAGNOSIS — J18.9 PNEUMONIA, UNSPECIFIED ORGANISM: ICD-10-CM

## 2020-06-24 DIAGNOSIS — J43.2 CENTRILOBULAR EMPHYSEMA: ICD-10-CM

## 2020-06-24 LAB
ANION GAP SERPL CALC-SCNC: 11 MMO/L — SIGNIFICANT CHANGE UP (ref 7–14)
BUN SERPL-MCNC: 20 MG/DL — SIGNIFICANT CHANGE UP (ref 7–23)
CALCIUM SERPL-MCNC: 9.3 MG/DL — SIGNIFICANT CHANGE UP (ref 8.4–10.5)
CHLORIDE SERPL-SCNC: 99 MMOL/L — SIGNIFICANT CHANGE UP (ref 98–107)
CO2 SERPL-SCNC: 32 MMOL/L — HIGH (ref 22–31)
CREAT SERPL-MCNC: 0.8 MG/DL — SIGNIFICANT CHANGE UP (ref 0.5–1.3)
GAMMA INTERFERON BACKGROUND BLD IA-ACNC: 0.13 IU/ML — SIGNIFICANT CHANGE UP
GLUCOSE BLDC GLUCOMTR-MCNC: 151 MG/DL — HIGH (ref 70–99)
GLUCOSE BLDC GLUCOMTR-MCNC: 89 MG/DL — SIGNIFICANT CHANGE UP (ref 70–99)
GLUCOSE BLDC GLUCOMTR-MCNC: 90 MG/DL — SIGNIFICANT CHANGE UP (ref 70–99)
GLUCOSE SERPL-MCNC: 101 MG/DL — HIGH (ref 70–99)
HCT VFR BLD CALC: 49.3 % — SIGNIFICANT CHANGE UP (ref 39–50)
HGB BLD-MCNC: 15.4 G/DL — SIGNIFICANT CHANGE UP (ref 13–17)
M TB IFN-G BLD-IMP: NEGATIVE — SIGNIFICANT CHANGE UP
M TB IFN-G CD4+ BCKGRND COR BLD-ACNC: 0.01 IU/ML — SIGNIFICANT CHANGE UP
M TB IFN-G CD4+CD8+ BCKGRND COR BLD-ACNC: 0.01 IU/ML — SIGNIFICANT CHANGE UP
MAGNESIUM SERPL-MCNC: 2 MG/DL — SIGNIFICANT CHANGE UP (ref 1.6–2.6)
MCHC RBC-ENTMCNC: 28.7 PG — SIGNIFICANT CHANGE UP (ref 27–34)
MCHC RBC-ENTMCNC: 31.2 % — LOW (ref 32–36)
MCV RBC AUTO: 92 FL — SIGNIFICANT CHANGE UP (ref 80–100)
NRBC # FLD: 0 K/UL — SIGNIFICANT CHANGE UP (ref 0–0)
PHOSPHATE SERPL-MCNC: 3.9 MG/DL — SIGNIFICANT CHANGE UP (ref 2.5–4.5)
PLATELET # BLD AUTO: 394 K/UL — SIGNIFICANT CHANGE UP (ref 150–400)
PMV BLD: 8.9 FL — SIGNIFICANT CHANGE UP (ref 7–13)
POTASSIUM SERPL-MCNC: 4.5 MMOL/L — SIGNIFICANT CHANGE UP (ref 3.5–5.3)
POTASSIUM SERPL-SCNC: 4.5 MMOL/L — SIGNIFICANT CHANGE UP (ref 3.5–5.3)
QUANT TB PLUS MITOGEN MINUS NIL: 6.84 IU/ML — SIGNIFICANT CHANGE UP
RBC # BLD: 5.36 M/UL — SIGNIFICANT CHANGE UP (ref 4.2–5.8)
RBC # FLD: 13.8 % — SIGNIFICANT CHANGE UP (ref 10.3–14.5)
SODIUM SERPL-SCNC: 142 MMOL/L — SIGNIFICANT CHANGE UP (ref 135–145)
T PALLIDUM AB TITR SER: NEGATIVE — SIGNIFICANT CHANGE UP
WBC # BLD: 5.23 K/UL — SIGNIFICANT CHANGE UP (ref 3.8–10.5)
WBC # FLD AUTO: 5.23 K/UL — SIGNIFICANT CHANGE UP (ref 3.8–10.5)

## 2020-06-24 PROCEDURE — 99233 SBSQ HOSP IP/OBS HIGH 50: CPT

## 2020-06-24 PROCEDURE — 93010 ELECTROCARDIOGRAM REPORT: CPT

## 2020-06-24 RX ADMIN — ATORVASTATIN CALCIUM 40 MILLIGRAM(S): 80 TABLET, FILM COATED ORAL at 22:19

## 2020-06-24 RX ADMIN — ENOXAPARIN SODIUM 40 MILLIGRAM(S): 100 INJECTION SUBCUTANEOUS at 13:15

## 2020-06-24 RX ADMIN — CHLORHEXIDINE GLUCONATE 1 APPLICATION(S): 213 SOLUTION TOPICAL at 06:42

## 2020-06-24 RX ADMIN — Medication 750 MILLIGRAM(S): at 06:43

## 2020-06-24 RX ADMIN — Medication 20 MILLIGRAM(S): at 06:42

## 2020-06-24 RX ADMIN — Medication 81 MILLIGRAM(S): at 13:15

## 2020-06-24 RX ADMIN — Medication 25 MILLIGRAM(S): at 06:43

## 2020-06-24 RX ADMIN — RISPERIDONE 0.5 MILLIGRAM(S): 4 TABLET ORAL at 19:06

## 2020-06-24 RX ADMIN — Medication 1 APPLICATION(S): at 06:42

## 2020-06-24 RX ADMIN — RISPERIDONE 0.5 MILLIGRAM(S): 4 TABLET ORAL at 06:43

## 2020-06-24 RX ADMIN — LISINOPRIL 2.5 MILLIGRAM(S): 2.5 TABLET ORAL at 06:43

## 2020-06-24 RX ADMIN — Medication 1 APPLICATION(S): at 22:19

## 2020-06-24 RX ADMIN — Medication 750 MILLIGRAM(S): at 19:05

## 2020-06-24 NOTE — PROGRESS NOTE ADULT - SUBJECTIVE AND OBJECTIVE BOX
Odalis Matute  [facility] Medicine  Pager #[phone]    Patient is a 60y old  Male who presents with a chief complaint of shortness of breath (23 Jun 2020 12:26)      SUBJECTIVE / OVERNIGHT EVENTS: Patient seen and examined at bedside. Patient feeling well, has chronic cough which is at baseline. Denies SOB or chest pain.     ADDITIONAL REVIEW OF SYSTEMS:    MEDICATIONS  (STANDING):  ammonium lactate 12% Lotion 1 Application(s) Topical two times a day  aspirin enteric coated 81 milliGRAM(s) Oral daily  atorvastatin 40 milliGRAM(s) Oral at bedtime  chlorhexidine 4% Liquid 1 Application(s) Topical <User Schedule>  dextrose 5%. 1000 milliLiter(s) (50 mL/Hr) IV Continuous <Continuous>  dextrose 50% Injectable 12.5 Gram(s) IV Push once  dextrose 50% Injectable 25 Gram(s) IV Push once  dextrose 50% Injectable 25 Gram(s) IV Push once  enoxaparin Injectable 40 milliGRAM(s) SubCutaneous daily  furosemide    Tablet 20 milliGRAM(s) Oral daily  insulin lispro (HumaLOG) corrective regimen sliding scale   SubCutaneous three times a day before meals  insulin lispro (HumaLOG) corrective regimen sliding scale   SubCutaneous at bedtime  levoFLOXacin  Tablet 750 milliGRAM(s) Oral every 24 hours  lisinopril 2.5 milliGRAM(s) Oral daily  metoprolol succinate ER 25 milliGRAM(s) Oral daily  risperiDONE   Tablet 0.5 milliGRAM(s) Oral two times a day  valproic acid 750 milliGRAM(s) Oral two times a day    MEDICATIONS  (PRN):  ALBUTerol    90 MICROgram(s) HFA Inhaler 2 Puff(s) Inhalation every 6 hours PRN Shortness of Breath and/or Wheezing  dextrose 40% Gel 15 Gram(s) Oral once PRN Blood Glucose LESS THAN 70 milliGRAM(s)/deciliter  glucagon  Injectable 1 milliGRAM(s) IntraMuscular once PRN Glucose LESS THAN 70 milligrams/deciliter      CAPILLARY BLOOD GLUCOSE      POCT Blood Glucose.: 90 mg/dL (24 Jun 2020 11:50)  POCT Blood Glucose.: 112 mg/dL (23 Jun 2020 21:18)  POCT Blood Glucose.: 95 mg/dL (23 Jun 2020 17:49)    I&O's Summary    23 Jun 2020 07:01  -  24 Jun 2020 07:00  --------------------------------------------------------  IN: 0 mL / OUT: 1500 mL / NET: -1500 mL    24 Jun 2020 07:01  -  24 Jun 2020 14:24  --------------------------------------------------------  IN: 960 mL / OUT: 850 mL / NET: 110 mL        PHYSICAL EXAM:    Vital Signs Last 24 Hrs  T(C): 36.7 (24 Jun 2020 10:32), Max: 36.8 (23 Jun 2020 21:24)  T(F): 98.1 (24 Jun 2020 10:32), Max: 98.2 (23 Jun 2020 21:24)  HR: 94 (24 Jun 2020 10:32) (94 - 122)  BP: 98/67 (24 Jun 2020 10:32) (97/62 - 145/98)  BP(mean): --  RR: 19 (24 Jun 2020 10:32) (18 - 19)  SpO2: 92% (24 Jun 2020 10:32) (92% - 95%)    CONSTITUTIONAL: NAD, well-developed, unkempt, malodorous   EYES: Conjunctiva and sclera clear  RESPIRATORY: Rhonchi in RML, crackles in b/l lower lobes  CARDIOVASCULAR: Regular rate and rhythm, normal S1 and S2, no murmur/rub/gallop; No lower extremity edema  ABDOMEN: Nontender to palpation, normoactive bowel sounds, no rebound/guarding  MUSCULOSKELETAL: no joint swelling or tenderness to palpation  PSYCH: A+O to person, place, and time  NEUROLOGY: No focal deficits  SKIN: Dry skin with flaking over b/l feet and ankles. Dry skin noted throughout body    LABS:                        15.4   5.23  )-----------( 394      ( 24 Jun 2020 06:15 )             49.3     06-24    142  |  99  |  20  ----------------------------<  101<H>  4.5   |  32<H>  |  0.80    Ca    9.3      24 Jun 2020 06:15  Phos  3.9     06-24  Mg     2.0     06-24        RADIOLOGY & ADDITIONAL TESTS:     < from: CT Chest No Cont (06.23.20 @ 14:46) >  FINDINGS:    LUNGS AND AIRWAYS: Bilateral lower lobe and left upper lobe bronchiectasis, with some opacification of the distal airways. New patchy opacities are noted in the right lower lobe. New bilateral lower lobe nodular opacities, some of which demonstrate internal cavitation, for example a 1.3 cm left lower lobe nodule (2:78). Tree-in-bud opacities are also noted in the superior segment of the right lower lobe. Interval resolution of a right upper lobe mixed solid and groundglass nodular opacity. Centrilobular emphysema.  PLEURA: No pleural effusion.  MEDIASTINUM AND GHULAM: Small mediastinal nodes.  VESSELS: Within normal limits.  HEART:Heart size is normal. No pericardial effusion.  CHEST WALL AND LOWER NECK: Within normal limits.  VISUALIZED UPPER ABDOMEN: Within normal limits.  BONES: Degenerative changes of the spine.    IMPRESSION:   Patchy opacities in the right lower lobe, ill-defined nodular opacities in both upper lobes and few tree-in-bud opacities are noted in the superior segment of the right lower lobe. The primary consideration is infection.    Interval resolution of a right upper lobe solid and groundglass nodularopacity.    Unchanged bilateral lower lobe and left upper lobe bronchiectasis.    Results Reviewed: Yes  Imaging Personally Reviewed:  Electrocardiogram Personally Reviewed:    COORDINATION OF CARE:  Care Discussed with Consultants/Other Providers [Y/N]:  Prior or Outpatient Records Reviewed [Y/N]:

## 2020-06-24 NOTE — PROGRESS NOTE ADULT - PROBLEM SELECTOR PLAN 7
Derm Saw patient and rec better hygiene and ammonium lactate cream bid  Seen by Podiatry- outpatient follow up for toe nails

## 2020-06-24 NOTE — PROGRESS NOTE ADULT - PROBLEM SELECTOR PLAN 9
Transitions of Care Status:  1.  Name of PCP: No PCP  2.  PCP Contacted on Admission: [ ] Y    [X] N    3.  PCP contacted at Discharge: [ ] Y    [ ] N    [ ] N/A  4.  Post-Discharge Appointment Date and Location:  5.  Summary of Handoff given to PCP:

## 2020-06-24 NOTE — DIETITIAN INITIAL EVALUATION ADULT. - PERTINENT LABORATORY DATA
06-24 Na142 mmol/L Glu 101 mg/dL<H> K+ 4.5 mmol/L Cr  0.80 mg/dL BUN 20 mg/dL 06-24 Phos 3.9 mg/dL 06-18 Alb 2.9 g/dL<L>

## 2020-06-24 NOTE — DIETITIAN INITIAL EVALUATION ADULT. - PERTINENT MEDS FT
MEDICATIONS  (STANDING):  ammonium lactate 12% Lotion 1 Application(s) Topical two times a day  aspirin enteric coated 81 milliGRAM(s) Oral daily  atorvastatin 40 milliGRAM(s) Oral at bedtime  chlorhexidine 4% Liquid 1 Application(s) Topical <User Schedule>  dextrose 5%. 1000 milliLiter(s) (50 mL/Hr) IV Continuous <Continuous>  dextrose 50% Injectable 12.5 Gram(s) IV Push once  dextrose 50% Injectable 25 Gram(s) IV Push once  dextrose 50% Injectable 25 Gram(s) IV Push once  enoxaparin Injectable 40 milliGRAM(s) SubCutaneous daily  furosemide    Tablet 20 milliGRAM(s) Oral daily  insulin lispro (HumaLOG) corrective regimen sliding scale   SubCutaneous three times a day before meals  insulin lispro (HumaLOG) corrective regimen sliding scale   SubCutaneous at bedtime  levoFLOXacin  Tablet 750 milliGRAM(s) Oral every 24 hours  lisinopril 2.5 milliGRAM(s) Oral daily  metoprolol succinate ER 25 milliGRAM(s) Oral daily  risperiDONE   Tablet 0.5 milliGRAM(s) Oral two times a day  valproic acid 750 milliGRAM(s) Oral two times a day

## 2020-06-24 NOTE — DIETITIAN INITIAL EVALUATION ADULT. - PATIENT PROFILE REVIEWED
yes
mind.  · Communicate your values and beliefs. Your teen can use your values to develop his or her own set of beliefs. · Talk about the pros and cons of not having sex, condom use, and birth control before your teen is sexually active. Talk to your teen about the chance of unwanted pregnancy. · Talk to your teen about common STIs (sexually transmitted infections), such as chlamydia. This is a common STI that can cause infertility if it is not treated. Chlamydia screening is recommended yearly for all sexually active young women. School  Tell your teen why you think school is important. Show interest in your teen's school. Encourage your teen to join a school team or activity. If your teen is having trouble with classes, get a  for him or her. If your teen is having problems with friends, other students, or teachers, work with your teen and the school staff to find out what is wrong. Immunizations  Flu immunization is recommended once a year for all children ages 7 months and older. Talk to your doctor if your teen did not yet get the vaccines for human papillomavirus (HPV), meningococcal disease, and tetanus, diphtheria, and pertussis. When should you call for help? Watch closely for changes in your teen's health, and be sure to contact your doctor if:  · You are concerned that your teen is not growing or learning normally for his or her age. · You are worried about your teen's behavior. · You have other questions or concerns. Where can you learn more? Go to [email or web address] and sign in to your [employer] account. Enter R741 in the [facility] box to learn more about \"Well Visit, 12 years to [employer] Teen: Care Instructions. \"     If you do not have an account, please click on the \"Sign Up Now\" link. Current as of: August 22, 2019               Content Version: 12.5  © 2611-6032 Healthwise, Incorporated. Care instructions adapted under license by [facility] ([facility]).  If you

## 2020-06-24 NOTE — PROGRESS NOTE ADULT - PROBLEM SELECTOR PLAN 2
C/w lisinopril 2.5 mg, Lasix 20 mg PO, and metoprolol succinate 25 mg. Appears euvolemic on exam  - C/w ASA 81 and atorvastatin 40  - EKG repeat with QTc 469ms

## 2020-06-24 NOTE — CHART NOTE - NSCHARTNOTEFT_GEN_A_CORE
ELECTROPHYSIOLOGY      Patient not on telemetry.  EKG done today: Sinus tachycardia 108 bpm. QRS duration 94ms.                            QT/Tc 350/469ms  No significant change in QTc.   Can continue low dose Risperdal. ELECTROPHYSIOLOGY      Patient not on telemetry.  EKG done today: Sinus tachycardia 108 bpm. QRS duration 94ms.                            QT/Tc 350/469ms  No significant change in QTc.   Can continue low dose Risperdal.  Keep K+ >4.0 and Mg >2.0.

## 2020-06-24 NOTE — DIETITIAN INITIAL EVALUATION ADULT. - ADD RECOMMEND
1- Continue current diet order, which remains appropriate at this time. 2- Monitor weights, labs, BM's, skin integrity, p.o. intake. 3- Please Encourage po intake, assist with meals and menu selections, provide alternatives PRN. - 4- RD remains available, re-consult as needed. Niecy Rick MS, RDN Pager #[phone]

## 2020-06-24 NOTE — DIETITIAN INITIAL EVALUATION ADULT. - OTHER INFO
Initial Dietitian Evaluation 2/2 to extended length of stay. 60 year old undomiciled man with history of schizoaffective disorder, HFrEF (3/2020 TTE EF24%, eccentric LVH) who presented with SOB and hypoxia. Admitted to MICU for hypercapnic respiratory failure requiring BiPAP.  No GI distress (nausea/vomiting/diarrhea/constipation.) No reports of chewing or swallowing difficulties or difficulties tolerating diet. Pt was 77.5 kgs on a prior admission 3/24/20. He is currently 76.7 kgs 6/23. Noted, HbA1c 6.6%.  Continue with Consistent Carbohydrate DASH (cholesterol & Na restricted) diet and suggest Recommend outpatient follow up with appropriate RD for purposes of longterm nutrition evaluation.

## 2020-06-25 ENCOUNTER — TRANSCRIPTION ENCOUNTER (OUTPATIENT)
Age: 60
End: 2020-06-25

## 2020-06-25 VITALS
SYSTOLIC BLOOD PRESSURE: 99 MMHG | RESPIRATION RATE: 18 BRPM | DIASTOLIC BLOOD PRESSURE: 70 MMHG | OXYGEN SATURATION: 94 % | HEART RATE: 84 BPM | TEMPERATURE: 97 F

## 2020-06-25 LAB
ANION GAP SERPL CALC-SCNC: 12 MMO/L — SIGNIFICANT CHANGE UP (ref 7–14)
BASOPHILS # BLD AUTO: 0.03 K/UL — SIGNIFICANT CHANGE UP (ref 0–0.2)
BASOPHILS NFR BLD AUTO: 0.6 % — SIGNIFICANT CHANGE UP (ref 0–2)
BUN SERPL-MCNC: 21 MG/DL — SIGNIFICANT CHANGE UP (ref 7–23)
CALCIUM SERPL-MCNC: 9.2 MG/DL — SIGNIFICANT CHANGE UP (ref 8.4–10.5)
CHLORIDE SERPL-SCNC: 96 MMOL/L — LOW (ref 98–107)
CO2 SERPL-SCNC: 33 MMOL/L — HIGH (ref 22–31)
CREAT SERPL-MCNC: 0.94 MG/DL — SIGNIFICANT CHANGE UP (ref 0.5–1.3)
EOSINOPHIL # BLD AUTO: 0.33 K/UL — SIGNIFICANT CHANGE UP (ref 0–0.5)
EOSINOPHIL NFR BLD AUTO: 6.4 % — HIGH (ref 0–6)
GLUCOSE BLDC GLUCOMTR-MCNC: 113 MG/DL — HIGH (ref 70–99)
GLUCOSE BLDC GLUCOMTR-MCNC: 99 MG/DL — SIGNIFICANT CHANGE UP (ref 70–99)
GLUCOSE SERPL-MCNC: 150 MG/DL — HIGH (ref 70–99)
HCT VFR BLD CALC: 47.6 % — SIGNIFICANT CHANGE UP (ref 39–50)
HGB BLD-MCNC: 14.9 G/DL — SIGNIFICANT CHANGE UP (ref 13–17)
IMM GRANULOCYTES NFR BLD AUTO: 0.6 % — SIGNIFICANT CHANGE UP (ref 0–1.5)
LYMPHOCYTES # BLD AUTO: 1.46 K/UL — SIGNIFICANT CHANGE UP (ref 1–3.3)
LYMPHOCYTES # BLD AUTO: 28.5 % — SIGNIFICANT CHANGE UP (ref 13–44)
MAGNESIUM SERPL-MCNC: 1.9 MG/DL — SIGNIFICANT CHANGE UP (ref 1.6–2.6)
MCHC RBC-ENTMCNC: 28.7 PG — SIGNIFICANT CHANGE UP (ref 27–34)
MCHC RBC-ENTMCNC: 31.3 % — LOW (ref 32–36)
MCV RBC AUTO: 91.7 FL — SIGNIFICANT CHANGE UP (ref 80–100)
MONOCYTES # BLD AUTO: 0.77 K/UL — SIGNIFICANT CHANGE UP (ref 0–0.9)
MONOCYTES NFR BLD AUTO: 15 % — HIGH (ref 2–14)
NEUTROPHILS # BLD AUTO: 2.51 K/UL — SIGNIFICANT CHANGE UP (ref 1.8–7.4)
NEUTROPHILS NFR BLD AUTO: 48.9 % — SIGNIFICANT CHANGE UP (ref 43–77)
NRBC # FLD: 0 K/UL — SIGNIFICANT CHANGE UP (ref 0–0)
PLATELET # BLD AUTO: 400 K/UL — SIGNIFICANT CHANGE UP (ref 150–400)
PMV BLD: 8.7 FL — SIGNIFICANT CHANGE UP (ref 7–13)
POTASSIUM SERPL-MCNC: 4.4 MMOL/L — SIGNIFICANT CHANGE UP (ref 3.5–5.3)
POTASSIUM SERPL-SCNC: 4.4 MMOL/L — SIGNIFICANT CHANGE UP (ref 3.5–5.3)
RBC # BLD: 5.19 M/UL — SIGNIFICANT CHANGE UP (ref 4.2–5.8)
RBC # FLD: 13.3 % — SIGNIFICANT CHANGE UP (ref 10.3–14.5)
SODIUM SERPL-SCNC: 141 MMOL/L — SIGNIFICANT CHANGE UP (ref 135–145)
WBC # BLD: 5.13 K/UL — SIGNIFICANT CHANGE UP (ref 3.8–10.5)
WBC # FLD AUTO: 5.13 K/UL — SIGNIFICANT CHANGE UP (ref 3.8–10.5)

## 2020-06-25 PROCEDURE — 99233 SBSQ HOSP IP/OBS HIGH 50: CPT

## 2020-06-25 PROCEDURE — 99239 HOSP IP/OBS DSCHRG MGMT >30: CPT

## 2020-06-25 PROCEDURE — 93010 ELECTROCARDIOGRAM REPORT: CPT

## 2020-06-25 RX ORDER — ASPIRIN/CALCIUM CARB/MAGNESIUM 324 MG
1 TABLET ORAL
Qty: 30 | Refills: 0
Start: 2020-06-25 | End: 2020-07-24

## 2020-06-25 RX ORDER — METOPROLOL TARTRATE 50 MG
1 TABLET ORAL
Qty: 30 | Refills: 0
Start: 2020-06-25 | End: 2020-07-24

## 2020-06-25 RX ORDER — FUROSEMIDE 40 MG
1 TABLET ORAL
Qty: 30 | Refills: 0
Start: 2020-06-25 | End: 2020-07-24

## 2020-06-25 RX ORDER — FLUPHENAZINE HYDROCHLORIDE 1 MG/1
1 TABLET, FILM COATED ORAL
Qty: 0 | Refills: 0 | DISCHARGE

## 2020-06-25 RX ORDER — LISINOPRIL 2.5 MG/1
1 TABLET ORAL
Qty: 30 | Refills: 0
Start: 2020-06-25 | End: 2020-07-24

## 2020-06-25 RX ORDER — SOD,AMMONIUM,POTASSIUM LACTATE
1 CREAM (GRAM) TOPICAL
Qty: 1 | Refills: 0
Start: 2020-06-25 | End: 2020-07-24

## 2020-06-25 RX ORDER — ALBUTEROL 90 UG/1
2 AEROSOL, METERED ORAL
Qty: 1 | Refills: 0
Start: 2020-06-25 | End: 2020-07-24

## 2020-06-25 RX ORDER — ATORVASTATIN CALCIUM 80 MG/1
1 TABLET, FILM COATED ORAL
Qty: 30 | Refills: 0
Start: 2020-06-25 | End: 2020-07-24

## 2020-06-25 RX ORDER — CIPROFLOXACIN LACTATE 400MG/40ML
1 VIAL (ML) INTRAVENOUS
Qty: 3 | Refills: 0
Start: 2020-06-25 | End: 2020-06-27

## 2020-06-25 RX ORDER — RISPERIDONE 4 MG/1
1 TABLET ORAL
Qty: 60 | Refills: 0
Start: 2020-06-25 | End: 2020-07-24

## 2020-06-25 RX ADMIN — RISPERIDONE 0.5 MILLIGRAM(S): 4 TABLET ORAL at 06:30

## 2020-06-25 RX ADMIN — Medication 1 APPLICATION(S): at 06:27

## 2020-06-25 RX ADMIN — Medication 20 MILLIGRAM(S): at 06:28

## 2020-06-25 RX ADMIN — ENOXAPARIN SODIUM 40 MILLIGRAM(S): 100 INJECTION SUBCUTANEOUS at 16:14

## 2020-06-25 RX ADMIN — Medication 750 MILLIGRAM(S): at 06:30

## 2020-06-25 RX ADMIN — Medication 1 APPLICATION(S): at 16:29

## 2020-06-25 RX ADMIN — RISPERIDONE 0.5 MILLIGRAM(S): 4 TABLET ORAL at 16:28

## 2020-06-25 RX ADMIN — Medication 750 MILLIGRAM(S): at 16:29

## 2020-06-25 RX ADMIN — CHLORHEXIDINE GLUCONATE 1 APPLICATION(S): 213 SOLUTION TOPICAL at 06:01

## 2020-06-25 RX ADMIN — Medication 81 MILLIGRAM(S): at 16:14

## 2020-06-25 NOTE — DISCHARGE NOTE PROVIDER - HOSPITAL COURSE
60 year old man from a shelter with history of schizoaffective disorder, HFrEF (3/2020 TTE EF24%, eccentric LVH) who presented with SOB and hypoxia, admitted to MICU for hypercapnic respiratory failure requiring BiPAP.         1. acute respiratory failure with hypercapnia: required BiPAP in MICU, with etiology 2/2 underlying obstructive disease given smoking history. CT chest with findings of emphysema as well as bronchiectasis. Patient completed a 5 day course of Solumedrol, will continue with albuterol pump as needed. Smoking cessation encouraged.             2. CHF: Seen by cardiology on last admission in March of this year. At that time, patient had low EF of 24% and was started on optimal medical therapy with ace-inhibitor, beta-blocker and maintenance diuretic. It was decided that patient would follow up as an outpatient to reassess need for additional pharmacotherapy and repeat echocardiogram. At this time, patient euvolemic on exam. Patient is at the 3 month azra for repeat echo but will pursue his ischemic workup/heart failure follow up as outpatient. Patient provided number for cardiology clinic.     To continue optimal medical therapy and aspirin and statin.         3. PNA: CT chest on 6/23 with findings of patchy opacities in b/l upper lobes and RLL, suspicious for infection. Started on Levaquin 750mg x5 days. EKG without worsening QTC, today 459. COVID neg x2.         4. Schizoaffective disorder: Continue Risperidone, Valproic Acid and follow up as outpatient with psych on 9/8 at 11am with Dr. Downey.         5. Dermatitis: Seen by dermatoklogy who recommended ammonium lactate creat twice day for affected area. Patient may also follow up with podiatry for toe nails as an outpatient.             SW following for assistance with placement- patient requesting LTC facility. PT evaluated patient and patient has no skilled PT needs. Patient not eligible for LTC facility. Currently refusing referral back to his shelter and wants to be discharged to the streets instead. Family not willing to take in patient. Patient with capacity to participate in discharge planning. Understands importance of medical follow up.         Patient seen and evaluated, no acute somatic complaints. Reviewed discharge medications with patient; All new medications requiring new prescriptions were sent to the pharmacy of patient's choice. Reviewed need for prescription for previous home medications and new prescriptions sent if requested. Medically cleared/stable for discharge as per Dr. Matute with close outpatient follow up within 1-2 weeks of discharge. Patient understands and agrees with plan of care.

## 2020-06-25 NOTE — PROGRESS NOTE ADULT - PROBLEM SELECTOR PLAN 2
C/w lisinopril 2.5 mg, Lasix 20 mg PO, and metoprolol succinate 25 mg. Appears euvolemic on exam  - C/w ASA 81 and atorvastatin 40  - EKG repeat with QTc 459ms

## 2020-06-25 NOTE — PROGRESS NOTE ADULT - PROBLEM SELECTOR PLAN 1
Hypercapnic respiratory failure requiring BiPAP   - S/p lasix 60 mg IV, duonebs, and dexamethasone 10 mg IV in the ED   - Etiology 2/2 underlying obstructive disease (given smoking history) vs. COVID PNA vs. HFrEF exacerbation (given previous admission 3/2020 for acute decompensated HF, EF 24%) vs. PE (less likely)     - Monitor PCO2, 80 --> 67   - BNP 1491   - C/w BiPAP 11/5, FiO2 40, 12  - C/w steroids and duonebs for COPD exacerbation
Hypercapnic respiratory failure requiring BiPAP   - Etiology 2/2 underlying obstructive disease (given smoking history) vs. HFrEF exacerbation (given previous admission 3/2020 for acute decompensated HF, EF 24%) vs. PE (less likely)     - C/w albuterol PRN  - Completed 5 days of Solumedrol
Hypercapnic respiratory failure requiring BiPAP   - S/p lasix 60 mg IV, duonebs, and dexamethasone 10 mg IV in the ED   - Etiology 2/2 underlying obstructive disease (given smoking history) vs. COVID PNA vs. HFrEF exacerbation (given previous admission 3/2020 for acute decompensated HF, EF 24%) vs. PE (less likely)     - Monitor PCO2, 80 --> 67   - BNP 1491   - C/w BiPAP 11/5, FiO2 40, 12  - C/w steroids and duonebs for COPD exacerbation
Hypercapnic respiratory failure requiring BiPAP in MICU  - Etiology 2/2 underlying obstructive disease given smoking history vs. HFrEF exacerbation (given previous admission 3/2020 for acute decompensated HF, EF 24%). Now resolved  - C/w albuterol PRN  - Completed 5 days of Solumedrol
Hypercapnic respiratory failure requiring BiPAP   - S/p lasix 60 mg IV, duonebs, and dexamethasone 10 mg IV in the ED and micu  - Etiology 2/2 underlying obstructive disease (given smoking history) vs. COVID PNA vs. HFrEF exacerbation (given previous admission 3/2020 for acute decompensated HF, EF 24%) vs. PE (less likely)     - C/w steroids and duonebs for COPD exacerbation

## 2020-06-25 NOTE — PROGRESS NOTE ADULT - PROBLEM SELECTOR PLAN 8
Lovenox sq  SW following for assistance with placement- patient requesting LTC facility. PT evaluated patient and patient has no skilled PT needs. Patient not eligible for LTC facility. Currently refusing referral back to his shelter and wants to be discharged to the streets instead. Family not willing to take in patient.
Lovenox sq  SW following for assistance with placement- patient requesting LTC facility. Pending PT jerrell
Transitions of Care Status:  1.  Name of PCP: No PCP  2.  PCP Contacted on Admission: [ ] Y    [ ] N    3.  PCP contacted at Discharge: [ ] Y    [ ] N    [ ] N/A  4.  Post-Discharge Appointment Date and Location:  5.  Summary of Handoff given to PCP:
Transitions of Care Status:  1.  Name of PCP: No PCP  2.  PCP Contacted on Admission: [ ] Y    [X] N    3.  PCP contacted at Discharge: [ ] Y    [ ] N    [ ] N/A  4.  Post-Discharge Appointment Date and Location:  5.  Summary of Handoff given to PCP:
lovenox sq

## 2020-06-25 NOTE — CHART NOTE - NSCHARTNOTEFT_GEN_A_CORE
Spoke to psych, no changes to medications. QTC today 459. Will continue current medications upon discharge and will make appt for patient to follow with Lorenza as an outpatient (he is an established patient but missed his appt due to covid.

## 2020-06-25 NOTE — DISCHARGE NOTE PROVIDER - CARE PROVIDERS DIRECT ADDRESSES
,[email or web address]

## 2020-06-25 NOTE — PROGRESS NOTE BEHAVIORAL HEALTH - NSBHCONSULTFOLLOWAFTERCARE_PSY_A_CORE FT
f/u with Dr Rodriguez at [facility] upon d/c. Patient states that he wants to schedule an appt himself.

## 2020-06-25 NOTE — PROGRESS NOTE ADULT - PROBLEM SELECTOR PROBLEM 8
Discharge planning issues
Discharge planning issues
Prophylactic measure

## 2020-06-25 NOTE — PROGRESS NOTE ADULT - PROBLEM SELECTOR PLAN 4
CT chest with findings of emphysema as well as bronchiectasis. Likely from long-term smoking.  - C/w albuterol PRN
CT chest with findings of emphysema as well as bronchiectasis. Likely from long-term smoking.  - C/w albuterol PRN  - Counseled patient on importance of tobacco cessation
Nodularity on CT scan  - given hx of smoking and past CT scan shows nodules, there is a concern for malignancy. patient will need a repeat  CT scan once acute issues resolve.
Nodularity on CT scan from 3/2020 with recommended 3-month follow up  - given hx of smoking and past CT scan shows nodules, concern for malignancy  - repeat CT chest ordered  - ID to evaluate patient to see if airborne/contact isolation can be d/mo
Nodularity on CT scan from 3/2020 with recommended 3-month follow up  - given hx of smoking and past CT scan shows nodules, there is a concern for malignancy  - repeat CT chest once off airborne isolation
Nodularity on CT scan  - given hx of smoking and past CT scan shows nodules, there is a concern for malignancy. patient will need a repeat  CT scan once acute issues resolve.
Nodularity on CT scan  - given hx of smoking and past CT scan shows nodules, there is a concern for malignancy. patient will need a repeat  CT scan once acute issues resolve.

## 2020-06-25 NOTE — PROGRESS NOTE ADULT - PROBLEM SELECTOR PLAN 6
A1c 6.6%  Continue with FS qac and qHS  Low dose ISS  Holding home oral diabetic medications
A1c 6.6%  Continue with FS qac and qHS  Low dose ISS  Holding home oral diabetic medications
Derm Saw patient and rec better hygiene and ammonium lactate cream bid  ?? severe tinea pedis - Seen by Podiatry- outpatient follow up for toe nails
Derm Saw patient and rec better hygiene and ammonium lactate cream bid  Seen by Podiatry- outpatient follow up for toe nails
FS with SS coverage

## 2020-06-25 NOTE — PHYSICAL THERAPY INITIAL EVALUATION ADULT - ADDITIONAL COMMENTS
Pt lives in homeless shelter. Independent in all functional activities prior to admission.     pt left semi-singh in bed, NAD. +call bell. Isolation precautions maintained.

## 2020-06-25 NOTE — PROGRESS NOTE BEHAVIORAL HEALTH - NSBHADMITCOUNSEL_PSY_A_CORE
importance of adherence to chosen treatment/client/family/caregiver education/risks and benefits of treatment options/instructions for management, treatment and follow up/risk factor reduction
importance of adherence to chosen treatment/instructions for management, treatment and follow up/risk factor reduction/risks and benefits of treatment options
risks and benefits of treatment options/risk factor reduction/instructions for management, treatment and follow up/client/family/caregiver education/importance of adherence to chosen treatment

## 2020-06-25 NOTE — DISCHARGE NOTE PROVIDER - NSDCFUADDAPPT_GEN_ALL_CORE_FT
Follow up with PCP within 1-2 weeks of discharge.   Follow up with cardiology within 1-2 weeks of discharge.   Follow up with podiatry within 1-2 weeks of discharge.   Follow up with dermatology within 1-2 weeks of discharge.   Follow up with psychiatry within 1-2 weeks of discharge. Follow up with PCP within 1-2 weeks of discharge.   Follow up with cardiology within 1-2 weeks of discharge: Call for appt  [phone].   Follow up with podiatry within 1-2 weeks of discharge.   Follow up with dermatology within 1-2 weeks of discharge.   Follow up with psychiatry within 1-2 weeks of discharge. You have an appt scheduled for 9/8/2020 at 11am - you may also called [phone] to change this once you have a cell phone or have a phone that you may use for best contact.

## 2020-06-25 NOTE — DISCHARGE NOTE PROVIDER - NSDCCPCAREPLAN_GEN_ALL_CORE_FT
PRINCIPAL DISCHARGE DIAGNOSIS  Diagnosis: Hypercapnic respiratory failure  Assessment and Plan of Treatment: You presented in respiratory failure secondary to your chronic obstructive pulmonary disease. You completed a 5 day course of Solumedrol, and you should continue with albuterol pump as needed. Smoking cessation encouraged.      SECONDARY DISCHARGE DIAGNOSES  Diagnosis: HFrEF (heart failure with reduced ejection fraction)  Assessment and Plan of Treatment: You have congestive heart failure and were seen by cardiology on your last admission in March of this year. At that time, it was recommended you take all your heart medications as prescribed and repeat echo as an outpatient. You should follow up with our cardiology clinic for a repeat echo within 1-2 weeks of discharge. Low salt diet, fluid restriction to 1500 ml daily, monitor your fluid intake and weight daily, exercise as tolerated 30 minutes daily, and follow up with your physician within 7 days.       Diagnosis: Dermatitis  Assessment and Plan of Treatment: Continue ammonium lactate cream twice day to the affected area. Follow up with podiatry for toe nails as an outpatient.    Diagnosis: Schizoaffective disorder  Assessment and Plan of Treatment: Continue Risperidone, Valproic Acid and follow up as outpatient with psych on 9/8 at 11am with Dr. Downey.    Diagnosis: Multifocal pneumonia  Assessment and Plan of Treatment: You were treated for pneumonia while admitted. Take Levaquin 750mg for three more days upon discharge. Your COVID swab was negative.

## 2020-06-25 NOTE — PROGRESS NOTE ADULT - ATTENDING COMMENTS
SW following for assistance with placement- patient requesting LTC facility. PT evaluated patient and patient has no skilled PT needs. Patient not eligible for LTC facility. Currently refusing referral back to his shelter and wants to be discharged to the streets instead. Family not willing to take in patient. Patient medically stable for discharge and has capacity to participate in discharge planning.    40 minutes spent on discharge planning

## 2020-06-25 NOTE — DISCHARGE NOTE PROVIDER - NSDCMRMEDTOKEN_GEN_ALL_CORE_FT
aspirin 81 mg oral delayed release tablet: 1 tab(s) orally once a day  atorvastatin 40 mg oral tablet: 1 tab(s) orally once a day (at bedtime)  benztropine 0.5 mg oral tablet: 1 tab(s) orally 2 times a day  divalproex sodium 250 mg oral delayed release tablet: 3 tab(s) orally 2 times a day  fluPHENAZine 5 mg oral tablet: 1 tab(s) orally 2 times a day  furosemide 20 mg oral tablet: 1 tab(s) orally once a day  lisinopril 2.5 mg oral tablet: 1 tab(s) orally once a day  metoprolol succinate 50 mg oral tablet, extended release: 1 tab(s) orally once a day  risperiDONE 1 mg oral tablet: 1 tab(s) orally 2 times a day atorvastatin 40 mg oral tablet: 1 tab(s) orally once a day (at bedtime)  benztropine 0.5 mg oral tablet: 1 tab(s) orally 2 times a day  divalproex sodium 250 mg oral delayed release tablet: 3 tab(s) orally 2 times a day  fluPHENAZine 5 mg oral tablet: 1 tab(s) orally 2 times a day  furosemide 20 mg oral tablet: 1 tab(s) orally once a day  metoprolol succinate 50 mg oral tablet, extended release: 1 tab(s) orally once a day  risperiDONE 1 mg oral tablet: 1 tab(s) orally 2 times a day albuterol 90 mcg/inh inhalation aerosol: 2 puff(s) inhaled every 6 hours, As needed, Shortness of Breath and/or Wheezing  ammonium lactate 12% topical lotion: 1 application topically 2 times a day  aspirin 81 mg oral delayed release tablet: 1 tab(s) orally once a day  atorvastatin 40 mg oral tablet: 1 tab(s) orally once a day (at bedtime)  divalproex sodium 250 mg oral delayed release tablet: 3 tab(s) orally 2 times a day  furosemide 20 mg oral tablet: 1 tab(s) orally once a day  levoFLOXacin 750 mg oral tablet: 1 tab(s) orally every 24 hours  lisinopril 2.5 mg oral tablet: 1 tab(s) orally once a day  metoprolol succinate 25 mg oral tablet, extended release: 1 tab(s) orally once a day  risperiDONE 0.5 mg oral tablet: 1 tab(s) orally 2 times a day

## 2020-06-25 NOTE — CHART NOTE - NSCHARTNOTEFT_GEN_A_CORE
ELECTROPHYSIOLOGY        EKG done today. Sinus tachycardia 124 bpm. QRS duration 88ms   QT/QTc:  320/459 ms. No significant change in QTc.  Can continue low dose Risperdal.   Keep K+ > 4.0 and Mg > 2.0.

## 2020-06-25 NOTE — PROGRESS NOTE BEHAVIORAL HEALTH - SUMMARY
The patient is a 60 y/o single, unsure about living situation (used to live with brother earlier this year), on SSI, -American male with a h/o schizoaffective d/o, multiple prior [facility] admissions (Last documented one in 2018),  with h/o one SA via OD per prior assessment, is in treatment at [facility] with Dr Bolaños (last f/u was in March 2020), unknown legal hx/violence, has a substance abuse h/o cocaine, crack and marijuana use, [place] the [place] was found to be short of breath. In the ED, he was hypoxic, tachycardic. Pt treated with lasix 60 mg IV, nitroglycerin 0.4 mg sublingual, and duonebs. VBG PCO2 80, placed on BiPAP. Refused to tolerate, continually ripping mask off and shouting obscenities. Rather than intubate, attempted conscious sedation with ketamine to help with bronchodilation and tolerance of BiPAP. Then transitioned to precedex gtt. Worsening agitation/aggression when precedex dose is lowered or switched off. Monitored in the CTI. Psychiatry has been consulted for agitation management. Urine tox: THC +    During evaluation, patient is calm, but easily irritable, belligerent, nonsensical- ' aids is not the same as coronavirus'. No logical conversation. Checked UE- no rigidity or tremors. Discussed with RN- discussed the agitation/aggression. He is noted to be religiously preoccupied + paranoid in his statements prior, belligerent, cursing at RN.    6/19- improvement in attitude, mood, but remains intermittently labile, at baseline delusional. Compliant with medications.     Recommendations  - Safety maintained on current observation status. If behaviors escalade, team can consider 1:1CO.   - Check qtc again. As of now 522. Ensure that K, Mg, Po4 is wnl.   - Continue Depakote 750mg BID - do IV for now . Check VA level on Sunday AM- before AM dose of Depakote  - If repeat qtc is less than 500, and if can take PO, start Risperidone 0.5mg BID PO.   - AGGRESSION- IF QTC<500, Haldol 2.5mg q 6hrs prn IM/IV/PO. IF QTC>500, Can give Ativan 1mg q 6hrs prn IM/IV/PO.
The patient is a 60 y/o single, unsure about living situation (used to live with brother earlier this year), on SSI, -American male with a h/o schizoaffective d/o, multiple prior [facility] admissions (Last documented one in 2018),  with h/o one SA via OD per prior assessment, is in treatment at [facility] with Dr Bolaños (last f/u was in March 2020), unknown legal hx/violence, has a substance abuse h/o cocaine, crack and marijuana use, presented to the ED from a train station where was found to be short of breath. In the ED, he was hypoxic, tachycardic. Pt treated with lasix 60 mg IV, nitroglycerin 0.4 mg sublingual, and duonebs. VBG PCO2 80, placed on BiPAP. Refused to tolerate, continually ripping mask off and shouting obscenities. Rather than intubate, attempted conscious sedation with ketamine to help with bronchodilation and tolerance of BiPAP. Then transitioned to precedex gtt. Worsening agitation/aggression when precedex dose is lowered or switched off. Monitored in the CTI. Psychiatry has been consulted for agitation management. Urine tox: THC +    During evaluation, patient is calm, but easily irritable, belligerent, nonsensical- ' aids is not the same as coronavirus'. No logical conversation. Checked UE- no rigidity or tremors. Discussed with RN- discussed the agitation/aggression. He is noted to be religiously preoccupied + paranoid in his statements prior, belligerent, cursing at RN.    6/19- improvement in attitude, mood, but remains intermittently labile, at baseline delusional. Compliant with medications.     6/22- improving mood, no si or hi, no behavioral issues    Recommendations  - Safety maintained on current observation status. If behaviors escalade, team can consider 1:1CO.   - Check qtc again. As of now 522. Ensure that K, Mg, Po4 is wnl.   - Continue Depakote 750mg BID. Reviewed levels.  - If repeat qtc is less than 500, and if can take PO, start Risperidone 0.5mg BID PO.   - AGGRESSION- IF QTC<500, Haldol 2.5mg q 6hrs prn IM/IV/PO. IF QTC>500, Can give Ativan 1mg q 6hrs prn IM/IV/PO.
The patient is a 58 y/o single, unsure about living situation (used to live with brother earlier this year), on SSI, -American male with a h/o schizoaffective d/o, multiple prior [facility] admissions (Last documented one in 2018),  with h/o one SA via OD per prior assessment, is in treatment at [facility] with Dr Bolaños (last f/u was in March 2020), unknown legal hx/violence, has a substance abuse h/o cocaine, crack and marijuana use, presented to the ED [place] a train station where was found to be short of breath. In the ED, he was hypoxic, tachycardic. Pt treated with lasix 60 mg IV, nitroglycerin 0.4 mg sublingual, and duonebs. VBG PCO2 80, placed on BiPAP. Refused to tolerate, continually ripping mask off and shouting obscenities. Rather than intubate, attempted conscious sedation with ketamine to help with bronchodilation and tolerance of BiPAP. Then transitioned to precedex gtt. Worsening agitation/aggression when precedex dose is lowered or switched off. Monitored in the CTI. Psychiatry has been consulted for agitation management. Urine tox: THC +    During evaluation, patient is calm, but easily irritable, belligerent, nonsensical- ' aids is not the same as coronavirus'. No logical conversation. Checked UE- no rigidity or tremors. Discussed with RN- discussed the agitation/aggression. He is noted to be religiously preoccupied + paranoid in his statements prior, belligerent, cursing at RN.    6/19- improvement in attitude, mood, but remains intermittently labile, at baseline delusional. Compliant with medications.     6/22- improving mood, no si or hi, no behavioral issues  6/25- improved mood, not irritable, no si or hi, no evident psychosis. Educated about need for compliance with psych meds and f/u at [facility]    Recommendations  - Safety maintained on current observation status.   - Continue Depakote 750mg BID. Reviewed levels.  - Continue Risperidone 0.5mg BID PO.   - AGGRESSION- IF QTC<500, Haldol 2.5mg q 6hrs prn IM/IV/PO. IF QTC>500, Can give Ativan 1mg q 6hrs prn IM/IV/PO.

## 2020-06-25 NOTE — PROGRESS NOTE BEHAVIORAL HEALTH - THOUGHT CONTENT
no
Principal Discharge DX:	 delivery delivered  Goal:	Healthy Mother and Baby  Assessment and plan of treatment:	Keep incisions clean and dry. No heavy lifting x 4 weeks. Nothing in the vagina for 6 weeks - no sex, tampons, douching, tub baths or pools. May Shower.
Delusions
Delusions
Other/Unremarkable

## 2020-06-25 NOTE — DISCHARGE NOTE NURSING/CASE MANAGEMENT/SOCIAL WORK - PATIENT PORTAL LINK FT
You can access the FollowMyHealth Patient Portal offered by [facility] by registering at the following website: [email or web address]. By joining [facility]’s FollowMyHealth portal, you will also be able to view your health information using other applications (apps) compatible with our system.

## 2020-06-25 NOTE — PROGRESS NOTE ADULT - SUBJECTIVE AND OBJECTIVE BOX
Odalis Matute  Division of Hospital Medicine  Pager #[phone]    Patient is a 60y old  Male who presents with a chief complaint of shortness of breath (24 Jun 2020 14:23)      SUBJECTIVE / OVERNIGHT EVENTS: Patient seen and examined at bedside. Patient getting EKG done, QTc was 459ms. Denies CP. Has chronic cough but denies SOB. States that he isn't able to wiggle his left pinky toe as well as his right pinky toe and it has always been that way.     ADDITIONAL REVIEW OF SYSTEMS:    MEDICATIONS  (STANDING):  ammonium lactate 12% Lotion 1 Application(s) Topical two times a day  aspirin enteric coated 81 milliGRAM(s) Oral daily  atorvastatin 40 milliGRAM(s) Oral at bedtime  chlorhexidine 4% Liquid 1 Application(s) Topical <User Schedule>  dextrose 5%. 1000 milliLiter(s) (50 mL/Hr) IV Continuous <Continuous>  dextrose 50% Injectable 12.5 Gram(s) IV Push once  dextrose 50% Injectable 25 Gram(s) IV Push once  dextrose 50% Injectable 25 Gram(s) IV Push once  enoxaparin Injectable 40 milliGRAM(s) SubCutaneous daily  furosemide    Tablet 20 milliGRAM(s) Oral daily  insulin lispro (HumaLOG) corrective regimen sliding scale   SubCutaneous three times a day before meals  insulin lispro (HumaLOG) corrective regimen sliding scale   SubCutaneous at bedtime  levoFLOXacin  Tablet 750 milliGRAM(s) Oral every 24 hours  lisinopril 2.5 milliGRAM(s) Oral daily  metoprolol succinate ER 25 milliGRAM(s) Oral daily  risperiDONE   Tablet 0.5 milliGRAM(s) Oral two times a day  valproic acid 750 milliGRAM(s) Oral two times a day    MEDICATIONS  (PRN):  ALBUTerol    90 MICROgram(s) HFA Inhaler 2 Puff(s) Inhalation every 6 hours PRN Shortness of Breath and/or Wheezing  dextrose 40% Gel 15 Gram(s) Oral once PRN Blood Glucose LESS THAN 70 milliGRAM(s)/deciliter  glucagon  Injectable 1 milliGRAM(s) IntraMuscular once PRN Glucose LESS THAN 70 milligrams/deciliter      CAPILLARY BLOOD GLUCOSE      POCT Blood Glucose.: 113 mg/dL (25 Jun 2020 08:26)  POCT Blood Glucose.: 151 mg/dL (24 Jun 2020 21:32)  POCT Blood Glucose.: 89 mg/dL (24 Jun 2020 17:54)    I&O's Summary    24 Jun 2020 07:01  -  25 Jun 2020 07:00  --------------------------------------------------------  IN: 960 mL / OUT: 1600 mL / NET: -640 mL    25 Jun 2020 07:01  -  25 Jun 2020 12:06  --------------------------------------------------------  IN: 0 mL / OUT: 700 mL / NET: -700 mL        PHYSICAL EXAM:    Vital Signs Last 24 Hrs  T(C): 36.3 (25 Jun 2020 06:26), Max: 36.6 (24 Jun 2020 21:37)  T(F): 97.4 (25 Jun 2020 06:26), Max: 97.8 (24 Jun 2020 21:37)  HR: 71 (25 Jun 2020 06:26) (71 - 116)  BP: 96/74 (25 Jun 2020 06:26) (90/59 - 101/74)  BP(mean): --  RR: 18 (25 Jun 2020 06:26) (18 - 18)  SpO2: 93% (25 Jun 2020 06:26) (93% - 96%)    CONSTITUTIONAL: NAD, well-developed, unkempt, malodorous   EYES: Conjunctiva and sclera clear  RESPIRATORY: Rhonchi in RML, improved  CARDIOVASCULAR: Regular rate and rhythm, normal S1 and S2, no murmur/rub/gallop; No lower extremity edema  ABDOMEN: Nontender to palpation, normoactive bowel sounds, no rebound/guarding  MUSCULOSKELETAL: no joint swelling or tenderness to palpation  PSYCH: A+O to person, place, and time  NEUROLOGY: No focal deficits  SKIN: Dry skin with flaking over b/l feet and ankles. Dry skin noted throughout body    LABS:                        14.9   5.13  )-----------( 400      ( 25 Jun 2020 10:25 )             47.6     06-25    141  |  96<L>  |  21  ----------------------------<  150<H>  4.4   |  33<H>  |  0.94    Ca    9.2      25 Jun 2020 10:25  Phos  3.9     06-24  Mg     1.9     06-25      RADIOLOGY & ADDITIONAL TESTS: No new imaging  Results Reviewed: Yes  Imaging Personally Reviewed:  Electrocardiogram Personally Reviewed:    COORDINATION OF CARE:  Care Discussed with Consultants/Other Providers [Y/N]:  Prior or Outpatient Records Reviewed [Y/N]:

## 2020-06-25 NOTE — PROGRESS NOTE BEHAVIORAL HEALTH - RISK ASSESSMENT
has risks- older male, schizoaffective d/o, history of noncompliance, homeless, medical issues, agitated earlier yesterday.
has risks- older male, schizoaffective d/o, history of noncompliance, homeless, medical issues, agitated earlier yesterday- now improved.
has chronic risks but not in imminent risk to hurt self or others- older male, schizoaffective d/o, history of noncompliance, prior psych admissions, homeless, medical issues, agitated/labile earlier in admission- now improved- mood stable, no si or hi, no psychosis, future oriented

## 2020-06-25 NOTE — PROGRESS NOTE ADULT - PROBLEM SELECTOR PLAN 5
A1c 6.6%  Continue with FS qac and qHS  Low dose ISS  Holding home oral diabetic medications
A1c 6.6%  Continue with FS qac and qHS  Low dose ISS  Holding home oral diabetic medications
Hold home lisinopril 2.5 mg, lasix 20 mg PO, and metoprolol succinate 50 mg in the setting of hypotension   - Restart ASA 81 and atorvastatin 40 when able to tolerate PO   - EKG showed , will get repeat later
Hx of schizoaffective disorder, home meds risperidone 1 mg BID, valproic acid 750 mg BID, fluphenazine 5 mg BID, and benztropine 0.5 mg BID   - Psych recs appreciated: c/w depakote 750 BID, risperidone 0.5mg BID
Hx of schizoaffective disorder, home meds risperidone 1 mg BID, valproic acid 750 mg BID, fluphenazine 5 mg BID, and benztropine 0.5 mg BID   - Psych recs appreciated: c/w depakote 750 BID, risperidone 0.5mg BID
Hold home lisinopril 2.5 mg, lasix 20 mg PO, and metoprolol succinate 50 mg in the setting of hypotension   - Restart ASA 81 and atorvastatin 40 when able to tolerate PO   - EKG showed , will get repeat later
Hold home lisinopril 2.5 mg, lasix 20 mg PO, and metoprolol succinate 50 mg in the setting of hypotension   - Restart ASA 81 and atorvastatin 40 when able to tolerate PO   - EKG showed , will get repeat later

## 2020-06-25 NOTE — PROGRESS NOTE ADULT - PROBLEM SELECTOR PLAN 3
Agitation, pulling BiPAP off and cursing   - Now sedated with Precedex for agitation  - Wean as tolerated
Agitation, pulling BiPAP off and cursing   - Now sedated with Precedex for agitation  - Wean as tolerated
CT chest on 6/23 with findings of patchy opacities in b/l upper lobes and RLL, suspicious for infection.  - C/w Levaquin 750mg x5 days  - Monitor EKG daily  - Low suspicion for COVID- no GGO seen, afebrile, no leukopenia, COVID PCR x2 negative- can d/c airborne and contact isolation
CT chest on 6/23 with findings of patchy opacities in b/l upper lobes and RLL, suspicious for infection. Lung exam today with new rhonchi and crackles.  - QTc okay, will start Levaquin 750mg x5 days  - Monitor EKG daily  - Low suspicion for COVID- no GGO seen, afebrile, no leukopenia, COVID PCR x2 negative- can d/c airborne and contact isolation
Hx of schizoaffective disorder, home meds risperidone 1 mg BID, valproic acid 750 mg BID, fluphenazine 5 mg BID, and benztropine 0.5 mg BID   - Psych recs appreciated: c/w depakote 750 BID  - C/w risperidone 0.5mg BID
Hx of schizoaffective disorder, home meds risperidone 1 mg BID, valproic acid 750 mg BID, fluphenazine 5 mg BID, and benztropine 0.5 mg BID   - Psych recs appreciated: c/w depakote 750 BID  - Repeat EKG today- if QTc <500ms can restart risperidone 0.5mg BID
Resolved

## 2020-06-25 NOTE — DISCHARGE NOTE PROVIDER - CARE PROVIDER_API CALL
Tobin Gonzales  CARDIOVASCULAR DISEASE  [address] [address] [place] [address]  Phone: [phone]  Fax: [phone]  Follow Up Time:     Tha Cutler (DPM)  Podiatric Medicine and Surgery  [address] [place] [address]  Phone: [phone]  Fax: [phone]  Follow Up Time:

## 2020-06-25 NOTE — PROGRESS NOTE ADULT - PROBLEM SELECTOR PROBLEM 4
Abnormal lung scan
Centrilobular emphysema
Centrilobular emphysema
Abnormal lung scan
Abnormal lung scan

## 2020-06-25 NOTE — PROGRESS NOTE BEHAVIORAL HEALTH - NSBHCHARTREVIEWLAB_PSY_A_CORE FT
CBC Full  -  ( 25 Jun 2020 10:25 )  WBC Count : 5.13 K/uL  RBC Count : 5.19 M/uL  Hemoglobin : 14.9 g/dL  Hematocrit : 47.6 %  Platelet Count - Automated : 400 K/uL  Mean Cell Volume : 91.7 fL  Mean Cell Hemoglobin : 28.7 pg  Mean Cell Hemoglobin Concentration : 31.3 %  Auto Neutrophil # : 2.51 K/uL  Auto Lymphocyte # : 1.46 K/uL  Auto Monocyte # : 0.77 K/uL  Auto Eosinophil # : 0.33 K/uL  Auto Basophil # : 0.03 K/uL  Auto Neutrophil % : 48.9 %  Auto Lymphocyte % : 28.5 %  Auto Monocyte % : 15.0 %  Auto Eosinophil % : 6.4 %  Auto Basophil % : 0.6 %  06-25    141  |  96<L>  |  21  ----------------------------<  150<H>  4.4   |  33<H>  |  0.94    Ca    9.2      25 Jun 2020 10:25  Phos  3.9     06-24  Mg     1.9     06-25

## 2020-06-25 NOTE — PROGRESS NOTE ADULT - PROBLEM SELECTOR PROBLEM 6
Dermatitis
Dermatitis
Type 2 diabetes mellitus without complication, without long-term current use of insulin

## 2020-06-25 NOTE — PROGRESS NOTE BEHAVIORAL HEALTH - NSBHFUPINTERVALHXFT_PSY_A_CORE
Met with the patient this afternoon. Calm, engages well with interview. No longer irritable or labile. Respectful. He denies any mood symptoms, denies any si or hi, denies any a./vh or paranoia. Records indicate that at baseline has some delusions, but today he denies any.   Checked UE- no tremors or cogwheeling.

## 2020-06-25 NOTE — PROGRESS NOTE ADULT - PROBLEM SELECTOR PROBLEM 2
Schizoaffective disorder
HFrEF (heart failure with reduced ejection fraction)
Schizoaffective disorder
Schizoaffective disorder

## 2020-06-25 NOTE — PHYSICAL THERAPY INITIAL EVALUATION ADULT - GENERAL OBSERVATIONS, REHAB EVAL
Pt received semi-singh in bed, NAD. Pt agreeable to PT consultation. Cleared for PT as per RN Vince.

## 2020-06-25 NOTE — DISCHARGE NOTE PROVIDER - NSFOLLOWUPCLINICS_GEN_ALL_ED_FT
[facility]  Psychiatry  [address] [place] [address]  Phone: [phone]  Fax:   Follow Up Time:

## 2020-06-25 NOTE — DISCHARGE NOTE NURSING/CASE MANAGEMENT/SOCIAL WORK - NSDCFUADDAPPT_GEN_ALL_CORE_FT
Follow up with PCP within 1-2 weeks of discharge.   Follow up with cardiology within 1-2 weeks of discharge: Call for appt  [phone].   Follow up with podiatry within 1-2 weeks of discharge.   Follow up with dermatology within 1-2 weeks of discharge.   Follow up with psychiatry within 1-2 weeks of discharge. You have an appt scheduled for 9/8/2020 at 11am - you may also called [phone] to change this once you have a cell phone or have a phone that you may use for best contact.

## 2020-06-25 NOTE — PHYSICAL THERAPY INITIAL EVALUATION ADULT - PERTINENT HX OF CURRENT PROBLEM, REHAB EVAL
60 y.o. male presenting with SOB and hypoxia. pt admitted to MICU for hypercapnic respiratory failure requiring BiPAP

## 2020-06-25 NOTE — PROGRESS NOTE BEHAVIORAL HEALTH - NSBHFUPINTERVALCCFT_PSY_A_CORE
has been calm, compliant, no behavioral issues. Started on Risperidone 0.5mg BID PO- tolerating it well.

## 2020-06-25 NOTE — PROGRESS NOTE BEHAVIORAL HEALTH - NSBHCHARTREVIEWVS_PSY_A_CORE FT
Vital Signs Last 24 Hrs  T(C): 36.3 (25 Jun 2020 06:26), Max: 36.6 (24 Jun 2020 21:37)  T(F): 97.4 (25 Jun 2020 06:26), Max: 97.8 (24 Jun 2020 21:37)  HR: 71 (25 Jun 2020 06:26) (71 - 116)  BP: 96/74 (25 Jun 2020 06:26) (90/59 - 101/74)  BP(mean): --  RR: 18 (25 Jun 2020 06:26) (18 - 18)  SpO2: 93% (25 Jun 2020 06:26) (93% - 96%)

## 2020-06-25 NOTE — PROGRESS NOTE ADULT - PROBLEM SELECTOR PROBLEM 1
Hypoxia
Acute respiratory failure with hypercapnia
Hypoxia
Hypoxia

## 2020-07-02 DIAGNOSIS — R09.02 HYPOXEMIA: ICD-10-CM

## 2020-07-02 DIAGNOSIS — R06.02 SHORTNESS OF BREATH: ICD-10-CM

## 2020-07-02 DIAGNOSIS — I50.9 HEART FAILURE, UNSPECIFIED: ICD-10-CM

## 2020-07-02 DIAGNOSIS — Z87.01 PERSONAL HISTORY OF PNEUMONIA (RECURRENT): ICD-10-CM

## 2020-07-02 DIAGNOSIS — R93.1 ABNORMAL FINDINGS ON DIAGNOSTIC IMAGING OF HEART AND CORONARY CIRCULATION: ICD-10-CM

## 2020-07-02 DIAGNOSIS — F17.200 NICOTINE DEPENDENCE, UNSPECIFIED, UNCOMPLICATED: ICD-10-CM

## 2020-07-02 DIAGNOSIS — J96.01 ACUTE RESPIRATORY FAILURE WITH HYPOXIA: ICD-10-CM

## 2020-07-02 DIAGNOSIS — F20.9 SCHIZOPHRENIA, UNSPECIFIED: ICD-10-CM

## 2020-07-02 PROBLEM — I50.20 UNSPECIFIED SYSTOLIC (CONGESTIVE) HEART FAILURE: Chronic | Status: ACTIVE | Noted: 2020-06-18

## 2020-07-02 PROBLEM — Z00.00 ENCOUNTER FOR PREVENTIVE HEALTH EXAMINATION: Status: ACTIVE | Noted: 2020-07-02

## 2020-07-02 RX ORDER — FUROSEMIDE 20 MG/1
20 TABLET ORAL DAILY
Refills: 0 | Status: ACTIVE | COMMUNITY

## 2020-07-02 RX ORDER — LISINOPRIL 2.5 MG/1
2.5 TABLET ORAL DAILY
Refills: 0 | Status: ACTIVE | COMMUNITY

## 2020-07-02 RX ORDER — METOPROLOL SUCCINATE 25 MG/1
25 TABLET, EXTENDED RELEASE ORAL DAILY
Refills: 0 | Status: ACTIVE | COMMUNITY

## 2020-07-02 RX ORDER — ATORVASTATIN CALCIUM 40 MG/1
40 TABLET, FILM COATED ORAL AT BEDTIME
Refills: 0 | Status: ACTIVE | COMMUNITY

## 2020-07-02 RX ORDER — RISPERIDONE 0.5 MG/1
0.5 TABLET, FILM COATED ORAL TWICE DAILY
Refills: 0 | Status: ACTIVE | COMMUNITY

## 2020-07-13 ENCOUNTER — APPOINTMENT (OUTPATIENT)
Dept: CARDIOLOGY | Facility: CLINIC | Age: 60
End: 2020-07-13

## 2020-09-01 PROCEDURE — G9005: CPT

## 2020-11-06 NOTE — PROGRESS NOTE ADULT - ASSESSMENT
Per PM shift rounding sheet, Geraldine fell asleep about 22:00, looked awake from 23:45 but went back to sleep. Patient was restless at 4:45 rounds,  Awake at 5 rounds, but no issues noted or voiced. Patient fell back asleep at 5:15. Q 15 minute safety rounds maintained.   59M PMH schizoaffective disorder presenting with hyoxia and increased leg swelling.  Less likley to be CHF, however diuresis has helped.  Given hypoxia, concern for COVID.

## 2021-03-23 NOTE — ED PROVIDER NOTE - CPE EDP [facility]
Subjective:   Jeffrey Cerda is a 49 y.o. male who presents for Diarrhea (X about 14 hours, looks like bile ) and Hernia (pain between navel and groin)       Diarrhea   This is a new problem. The current episode started yesterday. The problem occurs more than 10 times per day. The problem has been unchanged. The stool consistency is described as mucous and watery. Associated symptoms include abdominal pain, chills and sweats. Pertinent negatives include no coughing, fever, headaches, myalgias or vomiting. Nothing aggravates the symptoms. He has tried nothing for the symptoms.     Pt presents for evaluation of a new problem, reports 1 day history of diarrhea that has been occurring approximately every hour.  Additionally, patient states that he has a known umbilical hernia, which was previously reducible.  Over the past day, patient states that the hernia is no longer reducible and is extremely tender to touch.  Patient states that his abdomen hurts with any movement and walking where the hernia is located.  Additionally, patient states that he has felt warm, and has had chills.  He had Covid in 10/2020.  Denies any specific known ill contacts, however does have 3 different sites of employment.    Review of Systems   Constitutional: Positive for chills. Negative for fever and malaise/fatigue.   HENT: Negative for congestion and sore throat.    Eyes: Negative for pain.   Respiratory: Negative for cough and shortness of breath.    Cardiovascular: Negative for chest pain, orthopnea and leg swelling.   Gastrointestinal: Positive for abdominal pain and diarrhea. Negative for blood in stool, constipation, nausea and vomiting.   Genitourinary: Negative for dysuria.   Musculoskeletal: Negative for myalgias.   Skin: Negative for rash.   Neurological: Negative for dizziness, weakness and headaches.   Psychiatric/Behavioral: The patient is not nervous/anxious.    All other systems reviewed and are negative.      MEDS:  "  Current Outpatient Medications:   •  oxyCODONE-acetaminophen (PERCOCET-10)  MG Tab, Take 1 Tab by mouth every four hours as needed for Severe Pain., Disp: , Rfl:   •  baclofen (LIORESAL) 20 MG tablet, Take 20 mg by mouth 3 times a day., Disp: , Rfl:   •  lovastatin (MEVACOR) 20 MG Tab, Take 20 mg by mouth every evening., Disp: , Rfl:   •  gabapentin (NEURONTIN) 300 MG CAPS, Take 900 mg by mouth 3 times a day., Disp: , Rfl:   •  ondansetron (ZOFRAN ODT) 4 MG TABLET DISPERSIBLE, Take 1 Tab by mouth every 6 hours as needed for Nausea., Disp: 15 Tab, Rfl: 0  ALLERGIES:   Allergies   Allergen Reactions   • Corticosteroids        Patient's PMH, SocHx, SurgHx, FamHx, Drug allergies and medications were reviewed.     Objective:   /72 (BP Location: Right arm, Patient Position: Sitting, BP Cuff Size: Large adult)   Pulse 92   Temp 36.1 °C (97 °F) (Temporal)   Ht 1.981 m (6' 6\")   Wt (!) 193 kg (425 lb)   SpO2 95%   BMI 49.11 kg/m²     Physical Exam  Vitals and nursing note reviewed.   Constitutional:       General: He is awake. He is in acute distress (appears uncomfortable).      Appearance: Normal appearance. He is well-developed. He is obese. He is diaphoretic.   HENT:      Head: Normocephalic and atraumatic.      Right Ear: External ear normal.      Left Ear: External ear normal.      Nose: Nose normal.   Eyes:      Extraocular Movements: Extraocular movements intact.      Conjunctiva/sclera: Conjunctivae normal.   Neck:      Thyroid: No thyromegaly.      Trachea: Trachea normal.   Cardiovascular:      Rate and Rhythm: Normal rate and regular rhythm.      Pulses: Normal pulses.      Heart sounds: Normal heart sounds, S1 normal and S2 normal.   Pulmonary:      Effort: Pulmonary effort is normal.      Breath sounds: Normal breath sounds.   Abdominal:      General: Abdomen is protuberant. Bowel sounds are normal.      Palpations: Abdomen is soft.      Tenderness: There is abdominal tenderness in the " periumbilical area.      Hernia: A hernia is present. Hernia is present in the umbilical area.          Comments: Hernia non-reducible, provokes pain with light palpation.   Musculoskeletal:         General: Normal range of motion.      Cervical back: Full passive range of motion without pain, normal range of motion and neck supple.   Lymphadenopathy:      Cervical: No cervical adenopathy.   Skin:     General: Skin is warm.      Capillary Refill: Capillary refill takes less than 2 seconds.   Neurological:      General: No focal deficit present.      Mental Status: He is alert and oriented to person, place, and time.      Gait: Gait is intact.   Psychiatric:         Attention and Perception: Attention and perception normal.         Mood and Affect: Mood normal.         Speech: Speech normal.         Behavior: Behavior normal. Behavior is cooperative.         Thought Content: Thought content normal.         Judgment: Judgment normal.         Assessment/Plan:   Assessment    1. Irreducible ventral hernia    2. Chills    3. Diarrhea of presumed infectious origin    4. BMI 45.0-49.9, adult (HCC)      Vital signs stable urgent care visit.  Reviewed physical exam findings with patient and my concern of a newly irrreducible ventral hernia that causes a significant amount of pain.  Additionally, due to the patient having diarrhea and possibility of Covid, unable to obtain outpatient ultrasound.  Patient therefore referred to emergency department for higher level of care and further evaluation.  Transfer center was called and report was provided.  All questions were answered and patient agrees to this.   normal...

## 2021-04-18 NOTE — PROGRESS NOTE ADULT - NSHPATTENDINGPLANDISCUSS_GEN_ALL_CORE
Addended by: Liset Madrigal on: 4/18/2021 06:19 PM     Modules accepted: Orders
intern, RN, fellow
patient / brother/ pa
patient / pa
patient / rn/ pa/ Inf Control

## 2024-02-09 NOTE — H&P ADULT - NSICDXNOPASTSURGICALHX_GEN_ALL_CORE
Lab Results   Component Value Date    HGBA1C 6.5 (H) 11/07/2023       Recent Labs     02/08/24 2028 02/09/24  0623 02/09/24  1057 02/09/24  1532   POCGLU 136 165* 123 102         Blood Sugar Average: Last 72 hrs:  (P) 102  Podiatry to follow  Continue VAC dressing.  Follow-up outpt for potential split thickness skin graft.  Observe off abx.     <-- Click to add NO significant Past Surgical History

## 2025-06-19 NOTE — PROGRESS NOTE ADULT - PROBLEM SELECTOR PROBLEM 3
improved
Agitation
Agitation
Multifocal pneumonia
Multifocal pneumonia
Schizoaffective disorder
Schizoaffective disorder
Agitation